# Patient Record
Sex: MALE | Race: WHITE | NOT HISPANIC OR LATINO | Employment: UNEMPLOYED | URBAN - METROPOLITAN AREA
[De-identification: names, ages, dates, MRNs, and addresses within clinical notes are randomized per-mention and may not be internally consistent; named-entity substitution may affect disease eponyms.]

---

## 2017-07-03 ENCOUNTER — COMMUNICATION - HEALTHEAST (OUTPATIENT)
Dept: BEHAVIORAL HEALTH | Facility: CLINIC | Age: 32
End: 2017-07-03

## 2017-07-03 DIAGNOSIS — F25.9 SCHIZOAFFECTIVE DISORDER, CHRONIC CONDITION WITH ACUTE EXACERBATION (H): ICD-10-CM

## 2018-01-02 ENCOUNTER — AMBULATORY - HEALTHEAST (OUTPATIENT)
Dept: LAB | Facility: CLINIC | Age: 33
End: 2018-01-02

## 2018-01-02 DIAGNOSIS — Z79.899 ENCOUNTER FOR LONG-TERM (CURRENT) USE OF MEDICATIONS: ICD-10-CM

## 2018-01-02 DIAGNOSIS — F25.1 SCHIZOAFFECTIVE DISORDER, DEPRESSIVE TYPE (H): ICD-10-CM

## 2018-01-02 LAB
BASOPHILS # BLD AUTO: 0.1 THOU/UL (ref 0–0.2)
BASOPHILS NFR BLD AUTO: 0 % (ref 0–2)
EOSINOPHIL # BLD AUTO: 0.2 THOU/UL (ref 0–0.4)
EOSINOPHIL NFR BLD AUTO: 2 % (ref 0–6)
ERYTHROCYTE [DISTWIDTH] IN BLOOD BY AUTOMATED COUNT: 20.5 % (ref 11–14.5)
HCT VFR BLD AUTO: 37 % (ref 40–54)
HGB BLD-MCNC: 10.8 G/DL (ref 14–18)
LYMPHOCYTES # BLD AUTO: 2.2 THOU/UL (ref 0.8–4.4)
LYMPHOCYTES NFR BLD AUTO: 19 % (ref 20–40)
MCH RBC QN AUTO: 20.8 PG (ref 27–34)
MCHC RBC AUTO-ENTMCNC: 29.2 G/DL (ref 32–36)
MCV RBC AUTO: 71 FL (ref 80–100)
MONOCYTES # BLD AUTO: 1 THOU/UL (ref 0–0.9)
MONOCYTES NFR BLD AUTO: 9 % (ref 2–10)
NEUTROPHILS # BLD AUTO: 8.1 THOU/UL (ref 2–7.7)
NEUTROPHILS NFR BLD AUTO: 70 % (ref 50–70)
PLATELET # BLD AUTO: 331 THOU/UL (ref 140–440)
PMV BLD AUTO: 9.1 FL (ref 8.5–12.5)
RBC # BLD AUTO: 5.18 MILL/UL (ref 4.4–6.2)
WBC: 11.7 THOU/UL (ref 4–11)

## 2018-02-08 ENCOUNTER — RECORDS - HEALTHEAST (OUTPATIENT)
Dept: LAB | Facility: CLINIC | Age: 33
End: 2018-02-08

## 2018-02-08 LAB
CHOLEST SERPL-MCNC: 142 MG/DL
FASTING STATUS PATIENT QL REPORTED: ABNORMAL
FASTING STATUS PATIENT QL REPORTED: NORMAL
GLUCOSE BLD-MCNC: 112 MG/DL (ref 70–125)
HDLC SERPL-MCNC: 40 MG/DL
LDLC SERPL CALC-MCNC: 65 MG/DL
TRIGL SERPL-MCNC: 186 MG/DL

## 2018-02-09 LAB — HBA1C MFR BLD: 6.5 % (ref 4.2–6.1)

## 2018-07-12 ENCOUNTER — RECORDS - HEALTHEAST (OUTPATIENT)
Dept: ADMINISTRATIVE | Facility: OTHER | Age: 33
End: 2018-07-12

## 2018-07-18 ENCOUNTER — HOSPITAL ENCOUNTER (OUTPATIENT)
Dept: RADIOLOGY | Facility: CLINIC | Age: 33
Discharge: HOME | End: 2018-07-18
Attending: PHYSICIAN ASSISTANT

## 2018-07-18 DIAGNOSIS — D50.9 IDA (IRON DEFICIENCY ANEMIA): ICD-10-CM

## 2018-07-18 DIAGNOSIS — K59.04 CHRONIC IDIOPATHIC CONSTIPATION: ICD-10-CM

## 2018-12-30 ASSESSMENT — MIFFLIN-ST. JEOR: SCORE: 3536.28

## 2018-12-31 ENCOUNTER — ANESTHESIA - HEALTHEAST (OUTPATIENT)
Dept: SURGERY | Facility: CLINIC | Age: 33
End: 2018-12-31

## 2018-12-31 ENCOUNTER — SURGERY - HEALTHEAST (OUTPATIENT)
Dept: SURGERY | Facility: CLINIC | Age: 33
End: 2018-12-31

## 2019-01-06 ASSESSMENT — MIFFLIN-ST. JEOR: SCORE: 3368.45

## 2019-01-07 ENCOUNTER — COMMUNICATION - HEALTHEAST (OUTPATIENT)
Dept: SCHEDULING | Facility: CLINIC | Age: 34
End: 2019-01-07

## 2019-01-08 ENCOUNTER — RECORDS - HEALTHEAST (OUTPATIENT)
Dept: LAB | Facility: CLINIC | Age: 34
End: 2019-01-08

## 2019-01-08 LAB
BASOPHILS # BLD AUTO: 0.1 THOU/UL (ref 0–0.2)
BASOPHILS NFR BLD AUTO: 1 % (ref 0–2)
EOSINOPHIL # BLD AUTO: 0.2 THOU/UL (ref 0–0.4)
EOSINOPHIL NFR BLD AUTO: 1 % (ref 0–6)
ERYTHROCYTE [DISTWIDTH] IN BLOOD BY AUTOMATED COUNT: 19.8 % (ref 11–14.5)
HCT VFR BLD AUTO: 41.9 % (ref 40–54)
HGB BLD-MCNC: 12.5 G/DL (ref 14–18)
LYMPHOCYTES # BLD AUTO: 1.3 THOU/UL (ref 0.8–4.4)
LYMPHOCYTES NFR BLD AUTO: 11 % (ref 20–40)
MCH RBC QN AUTO: 23.9 PG (ref 27–34)
MCHC RBC AUTO-ENTMCNC: 29.8 G/DL (ref 32–36)
MCV RBC AUTO: 80 FL (ref 80–100)
MONOCYTES # BLD AUTO: 0.9 THOU/UL (ref 0–0.9)
MONOCYTES NFR BLD AUTO: 7 % (ref 2–10)
NEUTROPHILS # BLD AUTO: 9.8 THOU/UL (ref 2–7.7)
NEUTROPHILS NFR BLD AUTO: 80 % (ref 50–70)
PLATELET # BLD AUTO: 328 THOU/UL (ref 140–440)
PMV BLD AUTO: 10.1 FL (ref 8.5–12.5)
RBC # BLD AUTO: 5.23 MILL/UL (ref 4.4–6.2)
WBC: 12.5 THOU/UL (ref 4–11)

## 2019-01-14 ENCOUNTER — RECORDS - HEALTHEAST (OUTPATIENT)
Dept: LAB | Facility: CLINIC | Age: 34
End: 2019-01-14

## 2019-01-14 LAB
BASOPHILS # BLD AUTO: 0.1 THOU/UL (ref 0–0.2)
BASOPHILS NFR BLD AUTO: 1 % (ref 0–2)
EOSINOPHIL # BLD AUTO: 0.3 THOU/UL (ref 0–0.4)
EOSINOPHIL NFR BLD AUTO: 2 % (ref 0–6)
ERYTHROCYTE [DISTWIDTH] IN BLOOD BY AUTOMATED COUNT: 19.6 % (ref 11–14.5)
HCT VFR BLD AUTO: 42 % (ref 40–54)
HGB BLD-MCNC: 12.3 G/DL (ref 14–18)
LYMPHOCYTES # BLD AUTO: 1.9 THOU/UL (ref 0.8–4.4)
LYMPHOCYTES NFR BLD AUTO: 15 % (ref 20–40)
MCH RBC QN AUTO: 23.7 PG (ref 27–34)
MCHC RBC AUTO-ENTMCNC: 29.3 G/DL (ref 32–36)
MCV RBC AUTO: 81 FL (ref 80–100)
MONOCYTES # BLD AUTO: 1.1 THOU/UL (ref 0–0.9)
MONOCYTES NFR BLD AUTO: 9 % (ref 2–10)
NEUTROPHILS # BLD AUTO: 9.1 THOU/UL (ref 2–7.7)
NEUTROPHILS NFR BLD AUTO: 73 % (ref 50–70)
PLATELET # BLD AUTO: 286 THOU/UL (ref 140–440)
PMV BLD AUTO: 10 FL (ref 8.5–12.5)
RBC # BLD AUTO: 5.2 MILL/UL (ref 4.4–6.2)
WBC: 12.7 THOU/UL (ref 4–11)

## 2019-02-05 ENCOUNTER — RECORDS - HEALTHEAST (OUTPATIENT)
Dept: LAB | Facility: CLINIC | Age: 34
End: 2019-02-05

## 2019-02-05 LAB
BASOPHILS # BLD AUTO: 0.1 THOU/UL (ref 0–0.2)
BASOPHILS NFR BLD AUTO: 1 % (ref 0–2)
EOSINOPHIL # BLD AUTO: 0.2 THOU/UL (ref 0–0.4)
EOSINOPHIL NFR BLD AUTO: 2 % (ref 0–6)
ERYTHROCYTE [DISTWIDTH] IN BLOOD BY AUTOMATED COUNT: 20.2 % (ref 11–14.5)
HCT VFR BLD AUTO: 42.9 % (ref 40–54)
HGB BLD-MCNC: 12.6 G/DL (ref 14–18)
LYMPHOCYTES # BLD AUTO: 1.8 THOU/UL (ref 0.8–4.4)
LYMPHOCYTES NFR BLD AUTO: 17 % (ref 20–40)
MCH RBC QN AUTO: 24.2 PG (ref 27–34)
MCHC RBC AUTO-ENTMCNC: 29.4 G/DL (ref 32–36)
MCV RBC AUTO: 82 FL (ref 80–100)
MONOCYTES # BLD AUTO: 0.8 THOU/UL (ref 0–0.9)
MONOCYTES NFR BLD AUTO: 8 % (ref 2–10)
NEUTROPHILS # BLD AUTO: 7.5 THOU/UL (ref 2–7.7)
NEUTROPHILS NFR BLD AUTO: 72 % (ref 50–70)
PLATELET # BLD AUTO: 254 THOU/UL (ref 140–440)
PMV BLD AUTO: 10.6 FL (ref 8.5–12.5)
RBC # BLD AUTO: 5.21 MILL/UL (ref 4.4–6.2)
WBC: 10.6 THOU/UL (ref 4–11)

## 2019-03-06 ENCOUNTER — RECORDS - HEALTHEAST (OUTPATIENT)
Dept: LAB | Facility: CLINIC | Age: 34
End: 2019-03-06

## 2019-03-06 LAB
BASOPHILS # BLD AUTO: 0.1 THOU/UL (ref 0–0.2)
BASOPHILS NFR BLD AUTO: 1 % (ref 0–2)
EOSINOPHIL # BLD AUTO: 0.2 THOU/UL (ref 0–0.4)
EOSINOPHIL NFR BLD AUTO: 2 % (ref 0–6)
ERYTHROCYTE [DISTWIDTH] IN BLOOD BY AUTOMATED COUNT: 19 % (ref 11–14.5)
HCT VFR BLD AUTO: 43.4 % (ref 40–54)
HGB BLD-MCNC: 12.8 G/DL (ref 14–18)
LYMPHOCYTES # BLD AUTO: 1.5 THOU/UL (ref 0.8–4.4)
LYMPHOCYTES NFR BLD AUTO: 15 % (ref 20–40)
MCH RBC QN AUTO: 24.3 PG (ref 27–34)
MCHC RBC AUTO-ENTMCNC: 29.5 G/DL (ref 32–36)
MCV RBC AUTO: 83 FL (ref 80–100)
MONOCYTES # BLD AUTO: 1.2 THOU/UL (ref 0–0.9)
MONOCYTES NFR BLD AUTO: 11 % (ref 2–10)
NEUTROPHILS # BLD AUTO: 7.4 THOU/UL (ref 2–7.7)
NEUTROPHILS NFR BLD AUTO: 72 % (ref 50–70)
PLATELET # BLD AUTO: 250 THOU/UL (ref 140–440)
PMV BLD AUTO: 10.4 FL (ref 8.5–12.5)
RBC # BLD AUTO: 5.26 MILL/UL (ref 4.4–6.2)
WBC: 10.5 THOU/UL (ref 4–11)

## 2019-04-01 ENCOUNTER — RECORDS - HEALTHEAST (OUTPATIENT)
Dept: LAB | Facility: CLINIC | Age: 34
End: 2019-04-01

## 2019-04-01 LAB
BASOPHILS # BLD AUTO: 0.1 THOU/UL (ref 0–0.2)
BASOPHILS NFR BLD AUTO: 1 % (ref 0–2)
EOSINOPHIL # BLD AUTO: 0.2 THOU/UL (ref 0–0.4)
EOSINOPHIL NFR BLD AUTO: 2 % (ref 0–6)
ERYTHROCYTE [DISTWIDTH] IN BLOOD BY AUTOMATED COUNT: 19 % (ref 11–14.5)
HCT VFR BLD AUTO: 43.5 % (ref 40–54)
HGB BLD-MCNC: 12.7 G/DL (ref 14–18)
LYMPHOCYTES # BLD AUTO: 1.5 THOU/UL (ref 0.8–4.4)
LYMPHOCYTES NFR BLD AUTO: 15 % (ref 20–40)
MCH RBC QN AUTO: 24.8 PG (ref 27–34)
MCHC RBC AUTO-ENTMCNC: 29.2 G/DL (ref 32–36)
MCV RBC AUTO: 85 FL (ref 80–100)
MONOCYTES # BLD AUTO: 1 THOU/UL (ref 0–0.9)
MONOCYTES NFR BLD AUTO: 10 % (ref 2–10)
NEUTROPHILS # BLD AUTO: 7.2 THOU/UL (ref 2–7.7)
NEUTROPHILS NFR BLD AUTO: 72 % (ref 50–70)
PLATELET # BLD AUTO: 252 THOU/UL (ref 140–440)
PMV BLD AUTO: 10.4 FL (ref 8.5–12.5)
RBC # BLD AUTO: 5.13 MILL/UL (ref 4.4–6.2)
WBC: 10.2 THOU/UL (ref 4–11)

## 2021-06-02 ENCOUNTER — RECORDS - HEALTHEAST (OUTPATIENT)
Dept: ADMINISTRATIVE | Facility: CLINIC | Age: 36
End: 2021-06-02

## 2021-06-02 VITALS — WEIGHT: 315 LBS | BODY MASS INDEX: 38.36 KG/M2 | HEIGHT: 76 IN

## 2021-06-22 NOTE — ANESTHESIA CARE TRANSFER NOTE
Last vitals:   Vitals:    12/31/18 1547   BP: (!) 183/98   Pulse: (!) 103   Resp: 16   Temp: 37.3  C (99.1  F)   SpO2: 94%     Patient's level of consciousness is drowsy  Spontaneous respirations: yes  Maintains airway independently: yes  Dentition unchanged: yes  Oropharynx: oropharynx clear of all foreign objects    QCDR Measures:  ASA# 20 - Surgical Safety Checklist: WHO surgical safety checklist completed prior to induction    PQRS# 430 - Adult PONV Prevention: 4558F - Pt received => 2 anti-emetic agents (different classes) preop & intraop  ASA# 8 - Peds PONV Prevention: NA - Not pediatric patient, not GA or 2 or more risk factors NOT present  PQRS# 424 - Carlyn-op Temp Management: 4559F - At least one body temp DOCUMENTED => 35.5C or 95.9F within required timeframe  PQRS# 426 - PACU Transfer Protocol: - Transfer of care checklist used  ASA# 14 - Acute Post-op Pain: ASA14B - Patient did NOT experience pain >= 7 out of 10

## 2021-06-22 NOTE — ANESTHESIA PROCEDURE NOTES
Peripheral Block    Patient location during procedure: pre-op  Start time: 12/31/2018 1:05 PM  End time: 12/31/2018 1:10 PM  post-op analgesia per surgeon order as noted in medical record  Staffing:  Performing  Anesthesiologist: Yovani Richter MD  Preanesthetic Checklist  Completed: patient identified, site marked, risks, benefits, and alternatives discussed, timeout performed, consent obtained, airway assessed, oxygen available, suction available, emergency drugs available and hand hygiene performed  Peripheral Block  Block type: sciatic, popliteal  Prep: ChloraPrep  Patient position: supine  Patient monitoring: cardiac monitor, continuous pulse oximetry, heart rate and blood pressure  Laterality: left  Injection technique: ultrasound guided    Ultrasound used to visualize needle placement in proximity to nerve being blocked: yes   Permanent ultrasound image captured for medical record  Sterile gel and probe cover used for ultrasound.    Needle  Needle type: Stimuplex   Needle gauge: 20G  Needle length: 6 in  no peripheral nerve catheter placed  Assessment  Injection assessment: no difficulty with injection, negative aspiration for heme, no paresthesia on injection and incremental injection

## 2021-06-22 NOTE — ANESTHESIA POSTPROCEDURE EVALUATION
Patient: Sunil Rondon  OPEN REDUCTION INTERNAL FIXATION, FRACTURE, LEFT ANKLE  Anesthesia type: spinal    Patient location: PACU  Last vitals:   Vitals:    12/31/18 1610   BP: 138/61   Pulse: 96   Resp: 18   Temp:    SpO2: 96%     Post vital signs: stable  Level of consciousness: awake and responds to simple questions  Post-anesthesia pain: pain controlled  Post-anesthesia nausea and vomiting: no  Pulmonary: unassisted, return to baseline  Cardiovascular: stable and blood pressure at baseline  Hydration: adequate  Anesthetic events: no    QCDR Measures:  ASA# 11 - Carlyn-op Cardiac Arrest: ASA11B - Patient did NOT experience unanticipated cardiac arrest  ASA# 12 - Carlyn-op Mortality Rate: ASA12B - Patient did NOT die  ASA# 13 - PACU Re-Intubation Rate: NA - No ETT / LMA used for case  ASA# 10 - Composite Anes Safety: ASA10A - No serious adverse event    Additional Notes:

## 2021-06-22 NOTE — ANESTHESIA PROCEDURE NOTES
Peripheral Block    Patient location during procedure: pre-op  Start time: 12/31/2018 1:11 PM  End time: 12/31/2018 1:15 PM  post-op analgesia per surgeon order as noted in medical record  Staffing:  Performing  Anesthesiologist: Yovani Richter MD  Preanesthetic Checklist  Completed: patient identified, site marked, risks, benefits, and alternatives discussed, timeout performed, consent obtained, airway assessed, oxygen available, suction available, emergency drugs available and hand hygiene performed  Peripheral Block  Block type: saphenous, adductor canal block  Prep: ChloraPrep  Patient position: supine  Patient monitoring: cardiac monitor, continuous pulse oximetry, heart rate and blood pressure  Laterality: left  Injection technique: ultrasound guided    Ultrasound used to visualize needle placement in proximity to nerve being blocked: yes   Permanent ultrasound image captured for medical record  Sterile gel and probe cover used for ultrasound.    Needle  Needle type: Stimuplex   Needle gauge: 20G  Needle length: 6 in  no peripheral nerve catheter placed  Assessment  Injection assessment: no difficulty with injection, negative aspiration for heme, no paresthesia on injection and incremental injection

## 2021-06-22 NOTE — ANESTHESIA PROCEDURE NOTES
Spinal Block    Patient location during procedure: OR  Start time: 12/31/2018 1:34 PM  End time: 12/31/2018 1:37 PM  Reason for block: primary anesthetic    Staffing:  Performing  Anesthesiologist: Yovani Richter MD    Preanesthetic Checklist  Completed: patient identified, risks, benefits, and alternatives discussed, timeout performed, consent obtained, airway assessed, oxygen available, suction available, emergency drugs available and hand hygiene performed  Spinal Block  Patient position: sitting  Prep: ChloraPrep and site prepped and draped  Patient monitoring: heart rate, cardiac monitor, continuous pulse ox and blood pressure  Approach: midline  Location: L3-4  Injection technique: single-shot  Needle type: Quincke   Needle gauge: 22 G    Assessment  Sensory level: T6

## 2021-06-22 NOTE — ANESTHESIA PREPROCEDURE EVALUATION
Anesthesia Evaluation      Patient summary reviewed     Airway   Mallampati: II  Neck ROM: full   Pulmonary - negative ROS and normal exam    breath sounds clear to auscultation  (+) sleep apnea on CPAP, ,                          Cardiovascular - normal exam  (+) hypertension, ,     Rhythm: regular  Rate: normal,         Neuro/Psych    (+) depression, anxiety/panic attacks,     Endo/Other    (+) diabetes mellitus type 2, arthritis, obesity,      GI/Hepatic/Renal    (+) GERD,             Dental - normal exam                        Anesthesia Plan  Planned anesthetic: peripheral nerve block and spinal    ASA 3     Anesthetic plan and risks discussed with: patient    Post-op plan: routine recovery

## 2021-06-22 NOTE — TELEPHONE ENCOUNTER
[facility] calling from [facility] for [facility] and other records.     Advised Anaid to contact pt's PCP.     Anaid agrees to plan.     Reason for Disposition    Nursing judgment or information in reference    Protocols used: NO GUIDELINE [id number]

## 2021-09-30 PROCEDURE — 99284 EMERGENCY DEPT VISIT MOD MDM: CPT

## 2021-09-30 PROCEDURE — 99283 EMERGENCY DEPT VISIT LOW MDM: CPT

## 2021-10-01 ENCOUNTER — APPOINTMENT (OUTPATIENT)
Dept: RADIOLOGY | Facility: CLINIC | Age: 36
End: 2021-10-01
Attending: STUDENT IN AN ORGANIZED HEALTH CARE EDUCATION/TRAINING PROGRAM
Payer: MEDICAID

## 2021-10-01 ENCOUNTER — HOSPITAL ENCOUNTER (EMERGENCY)
Facility: CLINIC | Age: 36
Discharge: HOME | End: 2021-10-01
Attending: STUDENT IN AN ORGANIZED HEALTH CARE EDUCATION/TRAINING PROGRAM | Admitting: STUDENT IN AN ORGANIZED HEALTH CARE EDUCATION/TRAINING PROGRAM
Payer: MEDICAID

## 2021-10-01 VITALS
HEART RATE: 108 BPM | SYSTOLIC BLOOD PRESSURE: 130 MMHG | DIASTOLIC BLOOD PRESSURE: 79 MMHG | RESPIRATION RATE: 22 BRPM | OXYGEN SATURATION: 95 % | TEMPERATURE: 99.6 F

## 2021-10-01 DIAGNOSIS — J06.9 VIRAL URI: ICD-10-CM

## 2021-10-01 DIAGNOSIS — M25.561 ACUTE PAIN OF RIGHT KNEE: ICD-10-CM

## 2021-10-01 LAB
ATRIAL RATE - MUSE: 106 BPM
DIASTOLIC BLOOD PRESSURE - MUSE: NORMAL MMHG
INTERPRETATION ECG - MUSE: NORMAL
P AXIS - MUSE: 47 DEGREES
PR INTERVAL - MUSE: 188 MS
QRS DURATION - MUSE: 108 MS
QT - MUSE: 350 MS
QTC - MUSE: 464 MS
R AXIS - MUSE: 53 DEGREES
SYSTOLIC BLOOD PRESSURE - MUSE: NORMAL MMHG
T AXIS - MUSE: 28 DEGREES
VENTRICULAR RATE- MUSE: 106 BPM

## 2021-10-01 PROCEDURE — 93005 ELECTROCARDIOGRAM TRACING: CPT | Performed by: STUDENT IN AN ORGANIZED HEALTH CARE EDUCATION/TRAINING PROGRAM

## 2021-10-01 PROCEDURE — 250N000013 HC RX MED GY IP 250 OP 250 PS 637: Performed by: STUDENT IN AN ORGANIZED HEALTH CARE EDUCATION/TRAINING PROGRAM

## 2021-10-01 PROCEDURE — 93005 ELECTROCARDIOGRAM TRACING: CPT | Performed by: EMERGENCY MEDICINE

## 2021-10-01 PROCEDURE — 73562 X-RAY EXAM OF KNEE 3: CPT | Mod: RT

## 2021-10-01 RX ORDER — IBUPROFEN 600 MG/1
600 TABLET, FILM COATED ORAL ONCE
Status: COMPLETED | OUTPATIENT
Start: 2021-10-01 | End: 2021-10-01

## 2021-10-01 RX ORDER — ACETAMINOPHEN 325 MG/1
975 TABLET ORAL ONCE
Status: COMPLETED | OUTPATIENT
Start: 2021-10-01 | End: 2021-10-01

## 2021-10-01 RX ADMIN — ACETAMINOPHEN 975 MG: 325 TABLET ORAL at 04:24

## 2021-10-01 RX ADMIN — IBUPROFEN 600 MG: 600 TABLET, FILM COATED ORAL at 04:24

## 2021-10-01 NOTE — ED NOTES
Pt calling back to group home for ride.  No crutches or walker able to work due to pt's weight.  Provider aware

## 2021-10-01 NOTE — ED PROVIDER NOTES
EMERGENCY DEPARTMENT ENCOUNTER       ED Course & Medical Decision Making     3:42 AM I met with the patient to gather history and to perform my initial exam. We discussed results and the plan for discharge and orthopedic follow up. PPE worn: n95 mask.     Final Impression  36 year old male presents for evaluation of right knee pain as well as URI symptoms.  States that he fell in the bathroom while he was pivoting on 1 foot, fell awkwardly on his right leg and has had knee pain since that time.  Patient has significant tenderness with any attempted manipulation or range of motion of the knee, though exam is somewhat inconsistent at times (will sometimes  shout with pain with a certain exam maneuver or palpation of a particular spot, but then later when he is reexamined in the same spot has no pain).  May have mild effusion, though exam somewhat limited by patient's body habitus.  X-ray negative for acute bony injuries.  No history of dislocation that he is describing.  Good peripheral pulses in his right foot, good perfusion throughout toes, neuro intact in the foot.  Discussed with patient that he very well could have underlying soft tissue injury such as meniscus or ligament injury, though will need an MRI to ultimately determine that.  Patient too large for a knee immobilizer, thus will be Ace wrap x2.  Tylenol and ibuprofen given for pain control.  Also endorses some euros, the patient is not hypoxic, lungs clear to auscultation bilaterally.  Recommended Mucinex DM as needed.  Follow-up with orthopedics for further evaluation of his knee.    Prior to making a final disposition on this patient the results of patient's tests and other diagnostic studies were discussed with the patient. All questions were answered. Patient expressed understanding of the plan and was amenable to it.    Medications   acetaminophen (TYLENOL) tablet 975 mg (975 mg Oral Given 10/1/21 8845)   ibuprofen (ADVIL/MOTRIN) tablet 600 mg (600  mg Oral Given 10/1/21 1549)       Final Impression     1. Viral URI    2. Acute pain of right knee      Chief Complaint     Chief Complaint   Patient presents with     Knee Pain     Right      Cold Symptoms     Pt presents with R knee pain. Pt also c/o congestion and cold like symptoms. ABCs intact.     HPI     Sunil Rondon is a 36 year old male who presents for evaluation of right knee pain.    Patient reports right knee pain and swelling secondary to a mechanical fall today (10/1). He went to turn around in his bathroom when his right foot slipped and caused his right knee to twist. He felt a popping sensation in his knee followed by immediate pain. His pain is a constant and non-radiating 9/10. His pain is significantly worse with bending his knee. No other injuries or pain related complaints from the fall.     Patient also reports sore throat, cough, congestion, voice changes, headache, and chest pain. He has been taking ibuprofen without adequate relief.     I, Nickolas Brewer am serving as a scribe to document services personally performed by Dr. Roby Rae MD, based on my observation and the provider's statements to me. I, Dr. Roby Rae MD attest that Nickolas Brewer is acting in a scribe capacity, has observed my performance of the services and has documented them in accordance with my direction.    Past Medical History     Past Medical History:   Diagnosis Date     Anxiety      Bipolar 1 disorder (H)      Borderline mental retardation      Depression      Diabetes mellitus (H)      Gender identity disorder      GERD (gastroesophageal reflux disease)      Glucose intolerance (impaired glucose tolerance)      Hypertension      Obesity      Panic attacks      Schizoaffective disorder (H)      Sinus tarsi syndrome      Sleep apnea      Suicidal ideation      Past Surgical History:   Procedure Laterality Date     HC KNEE SCOPE,SINGLE MENISECTOMY Left 6/8/2016    Procedure: LEFT KNEE ARTHROSCOPY,  PARTIAL MEDIAL MENISCECTOMY, LOOSE BODY REMOVAL, CHONDROPLASTY;  Surgeon: Sky Goodwin MD;  Location: [facility] OR;  Service: Orthopedics     OPEN REDUCTION INTERNAL FIXATION ANKLE Left 12/31/2018    Procedure: OPEN REDUCTION INTERNAL FIXATION, FRACTURE, LEFT ANKLE;  Surgeon: Jarad Mosqueda MD;  Location: [facility];  Service: Orthopedics     TENDON REPAIR Left     wrist     UPPER GASTROINTESTINAL ENDOSCOPY       No family history on file.   Social History     Tobacco Use     Smoking status: Never Smoker     Smokeless tobacco: Never Used   Substance Use Topics     Alcohol use: No     Drug use: No       Relevant past medical, surgical, family and social history as documented above, has been reviewed and discussed with patient. No changes or additions, unless otherwise noted in the HPI.    Current Medications     acetaminophen (TYLENOL) 500 MG tablet  amLODIPine (NORVASC) 5 MG tablet  benzocaine-menthol (CEPACOL) 15-3.6 mg  cetirizine (ZYRTEC) 10 MG tablet  cloZAPine (CLOZARIL) 100 MG tablet  cyclobenzaprine (FLEXERIL) 10 MG tablet  docusate sodium (COLACE) 100 MG capsule  doxycycline (MONODOX) 100 MG capsule  enoxaparin (LOVENOX) 40 mg/0.4 mL syringe  ergocalciferol (ERGOCALCIFEROL) 50,000 unit capsule  famotidine (PEPCID) 20 MG tablet  ferrous gluconate 324 mg (37.5 mg iron) Tab  gabapentin (NEURONTIN) 600 MG tablet  gabapentin (NEURONTIN) 600 MG tablet  lamoTRIgine (LAMICTAL) 150 MG tablet  losartan (COZAAR) 100 MG tablet  melatonin 3 mg Tab  metFORMIN (GLUCOPHAGE) 500 MG tablet  OMEGA-3/DHA/EPA/FISH OIL (FISH OIL-OMEGA-3 FATTY ACIDS) 300-1,000 mg capsule  omeprazole (PRILOSEC) 40 MG capsule  ondansetron (ZOFRAN-ODT) 8 MG disintegrating tablet  oxyCODONE (ROXICODONE) 5 MG immediate release tablet  polyethylene glycol (MIRALAX) 17 gram packet  senna (SENOKOT) 8.6 mg tablet  sertraline (ZOLOFT) 100 MG tablet  simethicone (MYLICON) 80 MG chewable tablet  tolterodine (DETROL LA) 4 MG ER  capsule        Allergies     No Known Allergies    Review of Systems     Constitutional: Denies fevers  HENT: Endorses sore throat, congestion, voice changes. Denies ear pain  Respiratory: Endorses cough. Denies shortness of breath    Cardiovascular:  Endorses chest pain.  GI: Denies abdominal pain, nausea, vomiting  Musculoskeletal: Endorses right knee pain and swelling. Denies any new back pain.  Neurologic: Endorses headache. Denies new weakness, focal weakness, or sensory changes      Remainder of systems reviewed, unless noted in HPI all others negative.    Physical Exam     /79   Pulse 108   Temp 99.6  F (37.6  C) (Oral)   Resp 22   SpO2 95%   Constitutional: Awake, alert, in no acute distress  Head: Normocephalic, atraumatic.  ENT: Mucous membranes moist.   Eyes: PERRL, Conjunctiva normal  Respiratory: Respirations even, unlabored. Lungs clear to ascultation bilaterally, in no acute respiratory distress.  Cardiovascular: Sinus tachycardia, rate about 100. +2 radial pulses, equal bilaterally.  GI: Abdomen soft, non-tender   Musculoskeletal: Exquisite tenderness with any attempted range of motion of the right knee.  Evaluation for effusion somewhat limited by body habitus, though does appear to have a mild effusion.  +2 DP pulse in the right foot, good sensation throughout toes on the right foot.  Remainder of MSK exam is otherwise unremarkable.  Integument: Warm, dry.   Neurologic: Alert & oriented x 3. Normal speech. Grossly normal motor and sensory function. No focal deficits noted.  Psychiatric: Normal mood    Labs & Imaging     Results for orders placed or performed during the hospital encounter of 10/01/21   XR Knee Right 3 Views    Impression    IMPRESSION: No visible fracture or dislocation. Well-defined 1.6 x 0.8 cm sclerotic lesion medial aspect of the femur distally. Comparison to any prior imaging to confirm stability suggested.        Roby Rae MD  10/01/21 0691

## 2021-10-01 NOTE — DISCHARGE INSTRUCTIONS
You may try Mucinex DM to help with your URI symptoms    Follow-up with orthopedics for your knee pain as they may need to do additional examination and possibly an MRI of the knee.

## 2021-10-01 NOTE — ED NOTES
Pt reports intermitted SI over the last few weeks. Pt reports increased depression. Pt see counseling service weekly, MD updated.

## 2021-10-01 NOTE — ED NOTES
Notified provider that knee immobilizer size is not available for size of pt.  Did wrap knee with 2 large ace wraps.

## 2021-11-13 ENCOUNTER — TRANSFERRED RECORDS (OUTPATIENT)
Dept: HEALTH INFORMATION MANAGEMENT | Facility: CLINIC | Age: 36
End: 2021-11-13
Payer: MEDICAID

## 2021-11-13 ENCOUNTER — HOSPITAL ENCOUNTER (INPATIENT)
Facility: CLINIC | Age: 36
LOS: 5 days | Discharge: HOME HEALTH | End: 2021-11-18
Attending: EMERGENCY MEDICINE | Admitting: INTERNAL MEDICINE
Payer: MEDICAID

## 2021-11-13 DIAGNOSIS — J18.9 COMMUNITY ACQUIRED PNEUMONIA OF LEFT LOWER LOBE OF LUNG: Primary | ICD-10-CM

## 2021-11-13 PROCEDURE — 120N000001 HC R&B MED SURG/OB

## 2021-11-13 PROCEDURE — 96360 HYDRATION IV INFUSION INIT: CPT

## 2021-11-13 PROCEDURE — 99285 EMERGENCY DEPT VISIT HI MDM: CPT | Mod: 25

## 2021-11-13 PROCEDURE — 99223 1ST HOSP IP/OBS HIGH 75: CPT | Mod: AI | Performed by: INTERNAL MEDICINE

## 2021-11-13 PROCEDURE — C9803 HOPD COVID-19 SPEC COLLECT: HCPCS

## 2021-11-13 PROCEDURE — 93005 ELECTROCARDIOGRAM TRACING: CPT | Performed by: EMERGENCY MEDICINE

## 2021-11-13 PROCEDURE — 96361 HYDRATE IV INFUSION ADD-ON: CPT

## 2021-11-13 ASSESSMENT — MIFFLIN-ST. JEOR: SCORE: 3305.86

## 2021-11-14 LAB
ALBUMIN SERPL-MCNC: 3.3 G/DL (ref 3.5–5)
ALP SERPL-CCNC: 86 U/L (ref 45–120)
ALT SERPL W P-5'-P-CCNC: 26 U/L (ref 0–45)
ANION GAP SERPL CALCULATED.3IONS-SCNC: 11 MMOL/L (ref 5–18)
AST SERPL W P-5'-P-CCNC: 17 U/L (ref 0–40)
BASE EXCESS BLDV CALC-SCNC: 2.2 MMOL/L
BASOPHILS # BLD AUTO: 0.1 10E3/UL (ref 0–0.2)
BASOPHILS NFR BLD AUTO: 0 %
BILIRUB SERPL-MCNC: 0.2 MG/DL (ref 0–1)
BNP SERPL-MCNC: <10 PG/ML (ref 0–35)
BUN SERPL-MCNC: 12 MG/DL (ref 8–22)
CALCIUM SERPL-MCNC: 8.9 MG/DL (ref 8.5–10.5)
CHLORIDE BLD-SCNC: 104 MMOL/L (ref 98–107)
CO2 SERPL-SCNC: 24 MMOL/L (ref 22–31)
CREAT SERPL-MCNC: 0.81 MG/DL (ref 0.7–1.3)
EOSINOPHIL # BLD AUTO: 0.1 10E3/UL (ref 0–0.7)
EOSINOPHIL NFR BLD AUTO: 1 %
ERYTHROCYTE [DISTWIDTH] IN BLOOD BY AUTOMATED COUNT: 19.9 % (ref 10–15)
GFR SERPL CREATININE-BSD FRML MDRD: >90 ML/MIN/1.73M2
GLUCOSE BLD-MCNC: 54 MG/DL (ref 70–125)
GLUCOSE BLDC GLUCOMTR-MCNC: 101 MG/DL (ref 70–99)
GLUCOSE BLDC GLUCOMTR-MCNC: 105 MG/DL (ref 70–99)
GLUCOSE BLDC GLUCOMTR-MCNC: 109 MG/DL (ref 70–99)
GLUCOSE BLDC GLUCOMTR-MCNC: 118 MG/DL (ref 70–99)
GLUCOSE BLDC GLUCOMTR-MCNC: 152 MG/DL (ref 70–99)
GLUCOSE BLDC GLUCOMTR-MCNC: 153 MG/DL (ref 70–99)
GLUCOSE BLDC GLUCOMTR-MCNC: 54 MG/DL (ref 70–99)
GLUCOSE BLDC GLUCOMTR-MCNC: 75 MG/DL (ref 70–99)
GLUCOSE BLDC GLUCOMTR-MCNC: 89 MG/DL (ref 70–99)
GLUCOSE BLDC GLUCOMTR-MCNC: 93 MG/DL (ref 70–99)
GLUCOSE BLDC GLUCOMTR-MCNC: 93 MG/DL (ref 70–99)
GLUCOSE BLDC GLUCOMTR-MCNC: 97 MG/DL (ref 70–99)
HBA1C MFR BLD: 5.9 %
HCO3 BLDV-SCNC: 26 MMOL/L (ref 24–30)
HCT VFR BLD AUTO: 39.4 % (ref 40–53)
HGB BLD-MCNC: 12.1 G/DL (ref 13.3–17.7)
IMM GRANULOCYTES # BLD: 0.1 10E3/UL
IMM GRANULOCYTES NFR BLD: 1 %
L PNEUMO1 AG UR QL IA: NEGATIVE
LACTATE SERPL-SCNC: 1.8 MMOL/L (ref 0.7–2)
LIPASE SERPL-CCNC: 24 U/L (ref 0–52)
LYMPHOCYTES # BLD AUTO: 1.5 10E3/UL (ref 0.8–5.3)
LYMPHOCYTES NFR BLD AUTO: 10 %
MAGNESIUM SERPL-MCNC: 2 MG/DL (ref 1.8–2.6)
MAGNESIUM SERPL-MCNC: 2.1 MG/DL (ref 1.8–2.6)
MCH RBC QN AUTO: 23.2 PG (ref 26.5–33)
MCHC RBC AUTO-ENTMCNC: 30.7 G/DL (ref 31.5–36.5)
MCV RBC AUTO: 76 FL (ref 78–100)
MONOCYTES # BLD AUTO: 1.9 10E3/UL (ref 0–1.3)
MONOCYTES NFR BLD AUTO: 12 %
NEUTROPHILS # BLD AUTO: 11.4 10E3/UL (ref 1.6–8.3)
NEUTROPHILS NFR BLD AUTO: 76 %
NRBC # BLD AUTO: 0 10E3/UL
NRBC BLD AUTO-RTO: 0 /100
OXYHGB MFR BLDV: 83.9 % (ref 70–75)
PCO2 BLDV: 45 MM HG (ref 35–50)
PH BLDV: 7.39 [PH] (ref 7.35–7.45)
PLATELET # BLD AUTO: 259 10E3/UL (ref 150–450)
PO2 BLDV: 52 MM HG (ref 25–47)
POTASSIUM BLD-SCNC: 3.8 MMOL/L (ref 3.5–5)
POTASSIUM BLD-SCNC: 3.8 MMOL/L (ref 3.5–5)
PROCALCITONIN SERPL-MCNC: 0.09 NG/ML (ref 0–0.49)
PROT SERPL-MCNC: 7.5 G/DL (ref 6–8)
RBC # BLD AUTO: 5.22 10E6/UL (ref 4.4–5.9)
S PNEUM AG SPEC QL: NEGATIVE
SAO2 % BLDV: 84.4 % (ref 70–75)
SODIUM SERPL-SCNC: 139 MMOL/L (ref 136–145)
WBC # BLD AUTO: 15.1 10E3/UL (ref 4–11)

## 2021-11-14 PROCEDURE — 250N000013 HC RX MED GY IP 250 OP 250 PS 637: Performed by: INTERNAL MEDICINE

## 2021-11-14 PROCEDURE — 87899 AGENT NOS ASSAY W/OPTIC: CPT | Performed by: INTERNAL MEDICINE

## 2021-11-14 PROCEDURE — 83735 ASSAY OF MAGNESIUM: CPT | Performed by: INTERNAL MEDICINE

## 2021-11-14 PROCEDURE — 84145 PROCALCITONIN (PCT): CPT | Performed by: INTERNAL MEDICINE

## 2021-11-14 PROCEDURE — 83880 ASSAY OF NATRIURETIC PEPTIDE: CPT | Performed by: INTERNAL MEDICINE

## 2021-11-14 PROCEDURE — 83036 HEMOGLOBIN GLYCOSYLATED A1C: CPT | Performed by: INTERNAL MEDICINE

## 2021-11-14 PROCEDURE — 250N000011 HC RX IP 250 OP 636: Performed by: INTERNAL MEDICINE

## 2021-11-14 PROCEDURE — 36415 COLL VENOUS BLD VENIPUNCTURE: CPT | Performed by: INTERNAL MEDICINE

## 2021-11-14 PROCEDURE — 83605 ASSAY OF LACTIC ACID: CPT | Performed by: INTERNAL MEDICINE

## 2021-11-14 PROCEDURE — 84132 ASSAY OF SERUM POTASSIUM: CPT | Performed by: INTERNAL MEDICINE

## 2021-11-14 PROCEDURE — 87040 BLOOD CULTURE FOR BACTERIA: CPT | Performed by: INTERNAL MEDICINE

## 2021-11-14 PROCEDURE — 83690 ASSAY OF LIPASE: CPT | Performed by: INTERNAL MEDICINE

## 2021-11-14 PROCEDURE — 99233 SBSQ HOSP IP/OBS HIGH 50: CPT | Performed by: INTERNAL MEDICINE

## 2021-11-14 PROCEDURE — 80053 COMPREHEN METABOLIC PANEL: CPT | Performed by: INTERNAL MEDICINE

## 2021-11-14 PROCEDURE — 120N000001 HC R&B MED SURG/OB

## 2021-11-14 PROCEDURE — 258N000003 HC RX IP 258 OP 636: Performed by: INTERNAL MEDICINE

## 2021-11-14 PROCEDURE — 82805 BLOOD GASES W/O2 SATURATION: CPT | Performed by: INTERNAL MEDICINE

## 2021-11-14 PROCEDURE — 258N000001 HC RX 258: Performed by: INTERNAL MEDICINE

## 2021-11-14 PROCEDURE — 85025 COMPLETE CBC W/AUTO DIFF WBC: CPT | Performed by: INTERNAL MEDICINE

## 2021-11-14 PROCEDURE — 87205 SMEAR GRAM STAIN: CPT | Performed by: INTERNAL MEDICINE

## 2021-11-14 RX ORDER — IBUPROFEN 200 MG
600 TABLET ORAL EVERY 6 HOURS PRN
COMMUNITY
End: 2023-08-20

## 2021-11-14 RX ORDER — METOPROLOL SUCCINATE 200 MG/1
200 TABLET, EXTENDED RELEASE ORAL DAILY
COMMUNITY

## 2021-11-14 RX ORDER — METOPROLOL SUCCINATE 100 MG/1
200 TABLET, EXTENDED RELEASE ORAL DAILY
Status: DISCONTINUED | OUTPATIENT
Start: 2021-11-14 | End: 2021-11-18 | Stop reason: HOSPADM

## 2021-11-14 RX ORDER — CLOZAPINE 100 MG/1
300 TABLET ORAL AT BEDTIME
Status: DISCONTINUED | OUTPATIENT
Start: 2021-11-14 | End: 2021-11-18 | Stop reason: HOSPADM

## 2021-11-14 RX ORDER — ALBUTEROL SULFATE 90 UG/1
2 AEROSOL, METERED RESPIRATORY (INHALATION) EVERY 6 HOURS PRN
Status: DISCONTINUED | OUTPATIENT
Start: 2021-11-14 | End: 2021-11-18 | Stop reason: HOSPADM

## 2021-11-14 RX ORDER — ATORVASTATIN CALCIUM 20 MG/1
20 TABLET, FILM COATED ORAL AT BEDTIME
COMMUNITY

## 2021-11-14 RX ORDER — CLOZAPINE 50 MG/1
325 TABLET ORAL AT BEDTIME
COMMUNITY

## 2021-11-14 RX ORDER — DEXTROSE MONOHYDRATE 25 G/50ML
25-50 INJECTION, SOLUTION INTRAVENOUS
Status: DISCONTINUED | OUTPATIENT
Start: 2021-11-14 | End: 2021-11-18 | Stop reason: HOSPADM

## 2021-11-14 RX ORDER — ACETAMINOPHEN 650 MG/1
650 SUPPOSITORY RECTAL EVERY 6 HOURS PRN
Status: DISCONTINUED | OUTPATIENT
Start: 2021-11-14 | End: 2021-11-18 | Stop reason: HOSPADM

## 2021-11-14 RX ORDER — LAMOTRIGINE 150 MG/1
300 TABLET ORAL AT BEDTIME
Status: DISCONTINUED | OUTPATIENT
Start: 2021-11-14 | End: 2021-11-18 | Stop reason: HOSPADM

## 2021-11-14 RX ORDER — LIRAGLUTIDE 6 MG/ML
1.8 INJECTION SUBCUTANEOUS DAILY
COMMUNITY

## 2021-11-14 RX ORDER — SERTRALINE HYDROCHLORIDE 100 MG/1
300 TABLET, FILM COATED ORAL DAILY
Status: DISCONTINUED | OUTPATIENT
Start: 2021-11-14 | End: 2021-11-18 | Stop reason: HOSPADM

## 2021-11-14 RX ORDER — CEFTRIAXONE 2 G/1
2 INJECTION, POWDER, FOR SOLUTION INTRAMUSCULAR; INTRAVENOUS EVERY 24 HOURS
Status: DISCONTINUED | OUTPATIENT
Start: 2021-11-14 | End: 2021-11-17

## 2021-11-14 RX ORDER — ALBUTEROL SULFATE 90 UG/1
2 AEROSOL, METERED RESPIRATORY (INHALATION) EVERY 6 HOURS PRN
COMMUNITY

## 2021-11-14 RX ORDER — SODIUM CHLORIDE 9 MG/ML
INJECTION, SOLUTION INTRAVENOUS CONTINUOUS
Status: DISCONTINUED | OUTPATIENT
Start: 2021-11-14 | End: 2021-11-14

## 2021-11-14 RX ORDER — PANTOPRAZOLE SODIUM 20 MG/1
40 TABLET, DELAYED RELEASE ORAL 2 TIMES DAILY
Status: DISCONTINUED | OUTPATIENT
Start: 2021-11-14 | End: 2021-11-18 | Stop reason: HOSPADM

## 2021-11-14 RX ORDER — DEXTROSE MONOHYDRATE 25 G/50ML
25 INJECTION, SOLUTION INTRAVENOUS ONCE
Status: COMPLETED | OUTPATIENT
Start: 2021-11-14 | End: 2021-11-14

## 2021-11-14 RX ORDER — GUAIFENESIN/DEXTROMETHORPHAN 100-10MG/5
10 SYRUP ORAL EVERY 4 HOURS PRN
Status: DISCONTINUED | OUTPATIENT
Start: 2021-11-14 | End: 2021-11-18 | Stop reason: HOSPADM

## 2021-11-14 RX ORDER — NICOTINE POLACRILEX 4 MG
15-30 LOZENGE BUCCAL
Status: DISCONTINUED | OUTPATIENT
Start: 2021-11-14 | End: 2021-11-18 | Stop reason: HOSPADM

## 2021-11-14 RX ORDER — SUCRALFATE 1 G/1
1 TABLET ORAL 4 TIMES DAILY
Status: DISCONTINUED | OUTPATIENT
Start: 2021-11-14 | End: 2021-11-18 | Stop reason: HOSPADM

## 2021-11-14 RX ORDER — TIZANIDINE 2 MG/1
2 TABLET ORAL AT BEDTIME
Status: DISCONTINUED | OUTPATIENT
Start: 2021-11-14 | End: 2021-11-18 | Stop reason: HOSPADM

## 2021-11-14 RX ORDER — AZITHROMYCIN 500 MG/5ML
500 INJECTION, POWDER, LYOPHILIZED, FOR SOLUTION INTRAVENOUS EVERY 24 HOURS
Status: DISCONTINUED | OUTPATIENT
Start: 2021-11-14 | End: 2021-11-17

## 2021-11-14 RX ORDER — AMLODIPINE BESYLATE 5 MG/1
5 TABLET ORAL DAILY
Status: DISCONTINUED | OUTPATIENT
Start: 2021-11-14 | End: 2021-11-18 | Stop reason: HOSPADM

## 2021-11-14 RX ORDER — POLYETHYLENE GLYCOL 3350 17 G/17G
17 POWDER, FOR SOLUTION ORAL DAILY PRN
Status: DISCONTINUED | OUTPATIENT
Start: 2021-11-14 | End: 2021-11-18 | Stop reason: HOSPADM

## 2021-11-14 RX ORDER — LIDOCAINE 40 MG/G
CREAM TOPICAL
Status: DISCONTINUED | OUTPATIENT
Start: 2021-11-14 | End: 2021-11-18 | Stop reason: HOSPADM

## 2021-11-14 RX ORDER — ATORVASTATIN CALCIUM 10 MG/1
20 TABLET, FILM COATED ORAL AT BEDTIME
Status: DISCONTINUED | OUTPATIENT
Start: 2021-11-14 | End: 2021-11-18 | Stop reason: HOSPADM

## 2021-11-14 RX ORDER — ALBUTEROL SULFATE 0.83 MG/ML
2.5 SOLUTION RESPIRATORY (INHALATION) EVERY 6 HOURS PRN
Status: DISCONTINUED | OUTPATIENT
Start: 2021-11-14 | End: 2021-11-18 | Stop reason: HOSPADM

## 2021-11-14 RX ORDER — SUCRALFATE 1 G/1
1 TABLET ORAL 4 TIMES DAILY
COMMUNITY
End: 2024-09-27

## 2021-11-14 RX ORDER — FAMOTIDINE 20 MG/1
20 TABLET, FILM COATED ORAL 2 TIMES DAILY
Status: DISCONTINUED | OUTPATIENT
Start: 2021-11-14 | End: 2021-11-18 | Stop reason: HOSPADM

## 2021-11-14 RX ORDER — ACETAMINOPHEN 325 MG/1
650 TABLET ORAL EVERY 6 HOURS PRN
Status: DISCONTINUED | OUTPATIENT
Start: 2021-11-14 | End: 2021-11-18 | Stop reason: HOSPADM

## 2021-11-14 RX ORDER — GABAPENTIN 400 MG/1
800 CAPSULE ORAL DAILY PRN
Status: DISCONTINUED | OUTPATIENT
Start: 2021-11-14 | End: 2021-11-18 | Stop reason: HOSPADM

## 2021-11-14 RX ORDER — CLOZAPINE 25 MG/1
50 TABLET ORAL AT BEDTIME
Status: DISCONTINUED | OUTPATIENT
Start: 2021-11-14 | End: 2021-11-18 | Stop reason: HOSPADM

## 2021-11-14 RX ORDER — LOSARTAN POTASSIUM 50 MG/1
100 TABLET ORAL DAILY
Status: DISCONTINUED | OUTPATIENT
Start: 2021-11-14 | End: 2021-11-18 | Stop reason: HOSPADM

## 2021-11-14 RX ORDER — TIZANIDINE 2 MG/1
2 TABLET ORAL AT BEDTIME
COMMUNITY

## 2021-11-14 RX ORDER — GLIPIZIDE 10 MG/1
10 TABLET, FILM COATED, EXTENDED RELEASE ORAL DAILY
COMMUNITY
End: 2023-08-20

## 2021-11-14 RX ORDER — GABAPENTIN 400 MG/1
800 CAPSULE ORAL 2 TIMES DAILY
Status: DISCONTINUED | OUTPATIENT
Start: 2021-11-14 | End: 2021-11-18 | Stop reason: HOSPADM

## 2021-11-14 RX ADMIN — SUCRALFATE 1 G: 1 TABLET ORAL at 20:07

## 2021-11-14 RX ADMIN — ENOXAPARIN SODIUM 40 MG: 100 INJECTION SUBCUTANEOUS at 12:36

## 2021-11-14 RX ADMIN — METOPROLOL SUCCINATE 200 MG: 50 TABLET, EXTENDED RELEASE ORAL at 10:48

## 2021-11-14 RX ADMIN — SUCRALFATE 1 G: 1 TABLET ORAL at 12:35

## 2021-11-14 RX ADMIN — DEXTROSE AND SODIUM CHLORIDE: 5; 900 INJECTION, SOLUTION INTRAVENOUS at 03:05

## 2021-11-14 RX ADMIN — SUCRALFATE 1 G: 1 TABLET ORAL at 16:07

## 2021-11-14 RX ADMIN — FAMOTIDINE 20 MG: 20 TABLET ORAL at 10:49

## 2021-11-14 RX ADMIN — PANTOPRAZOLE SODIUM 40 MG: 20 TABLET, DELAYED RELEASE ORAL at 10:49

## 2021-11-14 RX ADMIN — ACETAMINOPHEN 650 MG: 325 TABLET ORAL at 16:11

## 2021-11-14 RX ADMIN — LOSARTAN POTASSIUM 100 MG: 50 TABLET, FILM COATED ORAL at 10:48

## 2021-11-14 RX ADMIN — DEXTROSE AND SODIUM CHLORIDE: 5; 900 INJECTION, SOLUTION INTRAVENOUS at 23:05

## 2021-11-14 RX ADMIN — CLOZAPINE 50 MG: 25 TABLET ORAL at 20:16

## 2021-11-14 RX ADMIN — PANTOPRAZOLE SODIUM 40 MG: 20 TABLET, DELAYED RELEASE ORAL at 20:07

## 2021-11-14 RX ADMIN — SERTRALINE 300 MG: 100 TABLET, FILM COATED ORAL at 10:48

## 2021-11-14 RX ADMIN — GABAPENTIN 800 MG: 400 CAPSULE ORAL at 10:48

## 2021-11-14 RX ADMIN — Medication 1 MG: at 00:58

## 2021-11-14 RX ADMIN — ACETAMINOPHEN 650 MG: 325 TABLET ORAL at 01:03

## 2021-11-14 RX ADMIN — SODIUM CHLORIDE: 9 INJECTION, SOLUTION INTRAVENOUS at 00:50

## 2021-11-14 RX ADMIN — ATORVASTATIN CALCIUM 20 MG: 10 TABLET, FILM COATED ORAL at 21:33

## 2021-11-14 RX ADMIN — TIZANIDINE 2 MG: 2 TABLET ORAL at 21:33

## 2021-11-14 RX ADMIN — FAMOTIDINE 20 MG: 20 TABLET ORAL at 20:07

## 2021-11-14 RX ADMIN — CEFTRIAXONE SODIUM 2 G: 2 INJECTION, POWDER, FOR SOLUTION INTRAMUSCULAR; INTRAVENOUS at 22:10

## 2021-11-14 RX ADMIN — ENOXAPARIN SODIUM 40 MG: 100 INJECTION SUBCUTANEOUS at 00:57

## 2021-11-14 RX ADMIN — ACETAMINOPHEN 650 MG: 325 TABLET ORAL at 21:32

## 2021-11-14 RX ADMIN — CLOZAPINE 300 MG: 100 TABLET ORAL at 20:17

## 2021-11-14 RX ADMIN — GABAPENTIN 800 MG: 400 CAPSULE ORAL at 20:07

## 2021-11-14 RX ADMIN — AMLODIPINE BESYLATE 5 MG: 5 TABLET ORAL at 12:35

## 2021-11-14 RX ADMIN — ACETAMINOPHEN 650 MG: 325 TABLET ORAL at 10:42

## 2021-11-14 RX ADMIN — DEXTROSE MONOHYDRATE 25 ML: 25 INJECTION, SOLUTION INTRAVENOUS at 03:03

## 2021-11-14 RX ADMIN — LAMOTRIGINE 300 MG: 150 TABLET ORAL at 21:32

## 2021-11-14 ASSESSMENT — ACTIVITIES OF DAILY LIVING (ADL)
ADLS_ACUITY_SCORE: 9
ADLS_ACUITY_SCORE: 9
ADLS_ACUITY_SCORE: 7
ADLS_ACUITY_SCORE: 5
ADLS_ACUITY_SCORE: 7
ADLS_ACUITY_SCORE: 9
ADLS_ACUITY_SCORE: 7
ADLS_ACUITY_SCORE: 5
ADLS_ACUITY_SCORE: 7
ADLS_ACUITY_SCORE: 5
ADLS_ACUITY_SCORE: 7

## 2021-11-14 NOTE — PROGRESS NOTES
[facility] Medicine PROGRESS NOTE      Identification/Summary: Sunil Rondon is a 36 year old male with a past medical history of developmental delay, anxiety, depression, GERD, DM 2, obstructive sleep apnea noncompliant with CPAP, morbid obesity BMI 62 presented with shortness of breath, cough who was admitted on 11/13/2021 for left-sided community-acquired pneumonia noted on CT, leukocytosis.  He was found to be hypoglycemic with a blood sugars of 54.  Started on D5 normal saline.  Last A1c 6.6.    Assessment and Plan:  Community-acquired pneumonia  Acute respiratory failure  Afebrile. Leukocytosis 15.1  Continue ceftriaxone, azithromycin  Check sputum cultures  Follow-up on respiratory panel.  On 4 L of oxygen  Wean off as tolerated.  Rapid flu, Covid negative.    Diabetes mellitus type 2  Hypoglycemia  Likely from poor oral intake.  Last A1c 8/9/2021 6.6  Hold off on oral hypoglycemics, Lantus  If the blood sugars are trending upwards above 150 we will stop D5  Insulin sliding scale as needed until oral intake improves    History of anxiety depression  Bipolar versus schizoaffective disorder  Developmental delay  On multiple psych meds continued    Morbid obesity BMI 62  Obstructive sleep apnea  Noncompliant with CPAP  Oxygen as needed    Hypertension  Continue home medications with holding parameters  On amlodipine losartan, metoprolol    GERD  Continue Pepcid, Carafate, Protonix    History of low back pain  on tizanidine, gabapentin    Diet: Combination Diet Regular Diet Adult  DVT Prophylaxis: On Lovenox   Full Code    Anticipated possible discharge in 1-2 days once clinical improvement milestones are met.    Interval History/Subjective:  He has a cough, headache, shortness of breath. he has a flat affect.     Physical Exam/Objective:  Vitals I/O   Vital signs:  Temp: 98  F (36.7  C) Temp src: Oral BP: (!) 126/96 Pulse: 92   Resp: 17 SpO2: 96 % O2 Device: Nasal cannula Oxygen Delivery: 4 LPM Height:  "190.5 cm (6' 3\") Weight: (!) 228 kg (502 lb 11.2 oz)  Estimated body mass index is 62.83 kg/m  as calculated from the following:    Height as of this encounter: 1.905 m (6' 3\").    Weight as of this encounter: 228 kg (502 lb 11.2 oz).     No intake/output data recorded.     Body mass index is 62.83 kg/m .    General Appearance:  Alert, cooperative, morbidly obese no distress   Head:  Normocephalic, atraumatic   Eyes:  PERRL    Throat:  mucosa; moist   Neck: No JVD, thyromegaly   Lungs:   Clear to auscultation bilaterally, no obvious rhonchi or wheezing respirations unlabored   Chest Wall:  No tenderness or deformity   Heart:  Regular rate and rhythm, S1, S2 normal,no murmur   Abdomen:   Soft, non tender obese, non distended, bowel sounds present, no guarding or rigidity   Extremities: No edema, no joint swelling   Skin: Skin color, texture, turgor normal, no rashes or lesions   Neurologic: Alert and oriented X 3, Moves all 4 extremities       Medications:   Personally Reviewed.    amLODIPine  5 mg Oral Daily     atorvastatin  20 mg Oral At Bedtime     azithromycin  500 mg Intravenous Q24H     cefTRIAXone  2 g Intravenous Q24H     cloZAPine  50 mg Oral At Bedtime     cloZAPine  300 mg Oral At Bedtime     enoxaparin ANTICOAGULANT  40 mg Subcutaneous Q12H     famotidine  20 mg Oral BID     gabapentin  800 mg Oral BID     [Held by provider] insulin aspart  1-7 Units Subcutaneous TID AC     [Held by provider] insulin aspart  1-5 Units Subcutaneous At Bedtime     lamoTRIgine  300 mg Oral At Bedtime     losartan  100 mg Oral Daily     metoprolol succinate ER  200 mg Oral Daily     pantoprazole  40 mg Oral BID     sertraline  300 mg Oral Daily     sodium chloride (PF)  3 mL Intracatheter Q8H     sucralfate  1 g Oral 4x Daily     tiZANidine  2 mg Oral At Bedtime       Data reviewed today: I personally reviewed all new medications, labs, imaging/diagnostics reports over the past 24 hours. Pertinent findings " include    Labs:  Most Recent 3 CBC's:Recent Labs   Lab Test 11/14/21  0056 03/25/20  1606 04/01/19  1055   WBC 15.1* 14.1* 10.2   HGB 12.1* 13.3* 12.7*   MCV 76* 84 85    258 252     Most Recent 3 BMP's:Recent Labs   Lab Test 11/14/21  0826 11/14/21  0602 11/14/21  0431 11/14/21  0138 11/14/21  0056 11/14/21  0051 03/25/20  1606 03/01/20  2305   NA  --   --   --   --  139  --  139 139   POTASSIUM  --   --   --   --  3.8  --  4.1 4.2   CHLORIDE  --   --   --   --  104  --  102 104   CO2  --   --   --   --  24  --  27 25   BUN  --   --   --   --  12  --  15 14   CR  --   --   --   --  0.81  --  0.72 0.81   ANIONGAP  --   --   --   --  11  --  10 10   SANAZ  --   --   --   --  8.9  --  9.6 9.5   * 89 93   < > 54*   < > 74 153*    < > = values in this interval not displayed.     Most Recent 2 LFT's:Recent Labs   Lab Test 11/14/21 0056 03/01/20  2305   AST 17 21   ALT 26 33   ALKPHOS 86 118   BILITOTAL 0.2 0.3     Most Recent 3 INR's:No lab results found.  Most Recent 3 Troponin's:No lab results found.  Most Recent Hemoglobin A1c:Recent Labs   Lab Test 12/31/18  0537   A1C 6.7*     Most Recent 6 glucoses:Recent Labs   Lab Test 11/14/21  0826 11/14/21  0602 11/14/21  0431 11/14/21  0402 11/14/21  0330 11/14/21  0253   * 89 93 97 105* 75       Imaging:   No results found for this or any previous visit (from the past 24 hour(s)).    EKG: Personally reviewed.  Sinus tachycardia 100/min no other acute ST-T wave changes.    Kacey Morris MD  Hospitalist  [facility]

## 2021-11-14 NOTE — PROGRESS NOTES
"Pt states, \"I'm feeling anxious.\"  He takes gabapentin for anxiety and meds weren't available yet.  He has now taken his medication and appears to be less anxious.  Will continue to monitor.  "

## 2021-11-14 NOTE — ED PROVIDER NOTES
Expected Patient Referral to ED  9:54 PM    Referring Clinic/Provider:  urgency room    Reason for referral/Clinical facts:  URI symptoms for a week.  Arrived with sats in the mid 80s.  On 3 L with good saturations.  CT with focal pneumonia.  Given Rocephin and Zithromax.  Will require hospitalization but no beds available.  Recommendations provided:  Board    Informed caller that recommendations provided are recommendations based only on the facts provided and that they responsible to accept or reject the advice, or to seek a formal in person consultation as needed and that this ED will see/treat patient should they arrive.      Shaheed Stallings MD  Emergency Medicine  [facility] EMERGENCY ROOM  [facility] [facility] [address] [phone]     Shaheed Stallings MD  11/14/21 0155

## 2021-11-14 NOTE — ED NOTES
Patient's oxygen was normalized and supplemental oxygen was turned down to 5 liters.  Patient remained asleep throughout the duration.  donte

## 2021-11-14 NOTE — ED PROVIDER NOTES
EMERGENCY DEPARTMENT ENCOUNTER      NAME: Sunil Rondon  AGE: 36 year old male  YOB: 1985  MRN: [id number]  EVALUATION DATE & TIME: 11/13/2021 10:53 PM    PCP: Bertrand Valle    ED PROVIDER: Shaheed Stallings M.D.      Chief Complaint   Patient presents with     Cough         FINAL IMPRESSION:  1. Community acquired pneumonia of left lower lobe of lung          ED COURSE & MEDICAL DECISION MAKING:    Pertinent Labs & Imaging studies reviewed. (See chart for details)  36 year old male presents to the Emergency Department for hospitalization.  Patient has been ill for last few weeks.  Over the last 2 days get increasingly short of breath with worsening cough.  Patient presented to the emergency room.  Extensive evaluation there performed.  White cell count mildly elevated at 15.6.  Electrolytes essentially unremarkable.  Glucose mildly elevated 158.  Lactic acid minimally elevated 2.9.  CT imaging with left lower lobe infiltrate consistent with community-acquired pneumonia.  Patient started on Zithromax and Rocephin.  Covid swabs and influenza swabs negative.  However patient with hypoxia on room air of 84%.  Patient placed on 4 L with good oxygenation at 94%.  Patient unable to be made a direct admit secondary to absence of bed.  Presents to the emergency room for boarding and subsequent hospitalization.. Patient appears non toxic with stable vitals signs.     11:07 PM I met with the patient for the initial interview and physical examination. Discussed plan for treatment and workup in the ED.    11:14 PM I spoke with the hospitalist regarding patient.  He is agreeable with plan for hospitalization.    At the conclusion of the encounter I discussed the results of all of the tests and the disposition. The questions were answered and return precautions provided. The patient or family acknowledged understanding and was agreeable with the care plan.       PPE: Provider wore gloves, N95 mask, eye protection,  surgical cap, and paper mask.     MEDICATIONS GIVEN IN THE EMERGENCY:  Medications - No data to display    NEW PRESCRIPTIONS STARTED AT TODAY'S ER VISIT  New Prescriptions    No medications on file          =================================================================    HPI    Patient information was obtained from: Patient    Use of Intrepreter: N/A         Sunil Rondon is a 36 year old male with a pertient medical history of developmental delay, schizoaffective disorder, bipolar order, diabetes mellitus type 2, hypertension, dyslipidemia, and morbid obesity who presents to the ED for evaluation of a cough.    Patient reports a cough, sore throat, body aches, and a headache for the past several days. He also notes one episode of vomiting at ~4:30 PM today. He was sent from the urgency room for pneumonia and sepsis. Patient is reported to be COVID negative. Denies fever, diarrhea, or any other complaints at this time.     REVIEW OF SYSTEMS   Constitutional:  Denies fever, chills  ENT: Positive for sore throat.  Respiratory:  Denies increased work of breathing. Positive for cough  Cardiovascular:  Denies chest pain, palpitations  GI:  Denies abdominal pain, nausea, or change in bowel or bladder habits. Positive for vomiting  Musculoskeletal:  Denies any new joint swelling. Positive for body aches  Skin:  Denies rash   Neurologic:  Denies focal weakness. Positive for headache  All systems negative except as marked.     PAST MEDICAL HISTORY:  Past Medical History:   Diagnosis Date     Anxiety      Bipolar 1 disorder (H)      Borderline mental retardation      Depression      Diabetes mellitus (H)      Gender identity disorder      GERD (gastroesophageal reflux disease)      Glucose intolerance (impaired glucose tolerance)      Hypertension      Obesity      Panic attacks      Schizoaffective disorder (H)      Sinus tarsi syndrome      Sleep apnea      Suicidal ideation        PAST SURGICAL HISTORY:  Past Surgical  History:   Procedure Laterality Date     HC KNEE SCOPE,SINGLE MENISECTOMY Left 6/8/2016    Procedure: LEFT KNEE ARTHROSCOPY, PARTIAL MEDIAL MENISCECTOMY, LOOSE BODY REMOVAL, CHONDROPLASTY;  Surgeon: Sky Goodwin MD;  Location: [facility];  Service: Orthopedics     OPEN REDUCTION INTERNAL FIXATION ANKLE Left 12/31/2018    Procedure: OPEN REDUCTION INTERNAL FIXATION, FRACTURE, LEFT ANKLE;  Surgeon: Jarad Mosqueda MD;  Location: [facility];  Service: Orthopedics     TENDON REPAIR Left     wrist     UPPER GASTROINTESTINAL ENDOSCOPY           CURRENT MEDICATIONS:    No current facility-administered medications for this encounter.    Current Outpatient Medications:      acetaminophen (TYLENOL) 500 MG tablet, [ACETAMINOPHEN (TYLENOL) 500 MG TABLET] Take 2 tablets (1,000 mg total) by mouth 3 (three) times a day., Disp: , Rfl: 0     amLODIPine (NORVASC) 5 MG tablet, [AMLODIPINE (NORVASC) 5 MG TABLET] Take 1 tablet (5 mg total) by mouth daily., Disp: 30 tablet, Rfl: 0     benzocaine-menthol (CEPACOL) 15-3.6 mg, [BENZOCAINE-MENTHOL (CEPACOL) 15-3.6 MG] Take 1 lozenge by mouth every hour as needed., Disp: , Rfl: 0     cetirizine (ZYRTEC) 10 MG tablet, [CETIRIZINE (ZYRTEC) 10 MG TABLET] Take 10 mg by mouth daily., Disp: , Rfl:      cloZAPine (CLOZARIL) 100 MG tablet, [CLOZAPINE (CLOZARIL) 100 MG TABLET] Take 3 tablets (300 mg total) by mouth at bedtime. May use home supply, Disp: 42 tablet, Rfl: 0     cyclobenzaprine (FLEXERIL) 10 MG tablet, [CYCLOBENZAPRINE (FLEXERIL) 10 MG TABLET] Take 10 mg by mouth 2 (two) times a day., Disp: , Rfl:      docusate sodium (COLACE) 100 MG capsule, [DOCUSATE SODIUM (COLACE) 100 MG CAPSULE] Take 1 capsule (100 mg total) by mouth 2 (two) times a day as needed for constipation., Disp: , Rfl: 0     doxycycline (MONODOX) 100 MG capsule, [DOXYCYCLINE (MONODOX) 100 MG CAPSULE] Take 100 mg by mouth daily., Disp: , Rfl:      enoxaparin (LOVENOX) 40 mg/0.4 mL syringe, [ENOXAPARIN  (LOVENOX) 40 MG/0.4 ML SYRINGE] Inject 0.4 mL (40 mg total) under the skin every 12 (twelve) hours., Disp: 60 Syringe, Rfl: 0     ergocalciferol (ERGOCALCIFEROL) 50,000 unit capsule, [ERGOCALCIFEROL (ERGOCALCIFEROL) 50,000 UNIT CAPSULE] Take 50,000 Units by mouth 2 (two) times a week. Monday and Thursday      , Disp: , Rfl:      famotidine (PEPCID) 20 MG tablet, [FAMOTIDINE (PEPCID) 20 MG TABLET] Take 20 mg by mouth 2 (two) times a day., Disp: , Rfl:      ferrous gluconate 324 mg (37.5 mg iron) Tab, [FERROUS GLUCONATE 324 MG (37.5 MG IRON) TAB] Take 1 tablet by mouth 3 (three) times a day with meals., Disp: , Rfl:      gabapentin (NEURONTIN) 600 MG tablet, [GABAPENTIN (NEURONTIN) 600 MG TABLET] Take 600 mg by mouth 3 (three) times a day as needed., Disp: , Rfl:      gabapentin (NEURONTIN) 600 MG tablet, [GABAPENTIN (NEURONTIN) 600 MG TABLET] Take 600 mg by mouth at bedtime.       , Disp: , Rfl:      lamoTRIgine (LAMICTAL) 150 MG tablet, [LAMOTRIGINE (LAMICTAL) 150 MG TABLET] Take 300 mg by mouth at bedtime.       , Disp: , Rfl:      losartan (COZAAR) 100 MG tablet, [LOSARTAN (COZAAR) 100 MG TABLET] Take 100 mg by mouth daily., Disp: , Rfl:      melatonin 3 mg Tab, [MELATONIN 3 MG TAB] Take 6 mg by mouth at bedtime.       , Disp: , Rfl:      metFORMIN (GLUCOPHAGE) 500 MG tablet, [METFORMIN (GLUCOPHAGE) 500 MG TABLET] Take 1,000 mg by mouth 2 (two) times a day with meals., Disp: , Rfl:      OMEGA-3/DHA/EPA/FISH OIL (FISH OIL-OMEGA-3 FATTY ACIDS) 300-1,000 mg capsule, [OMEGA-3/DHA/EPA/FISH OIL (FISH OIL-OMEGA-3 FATTY ACIDS) 300-1,000 MG CAPSULE] Take 1 g by mouth 2 (two) times a day., Disp: , Rfl:      omeprazole (PRILOSEC) 40 MG capsule, [OMEPRAZOLE (PRILOSEC) 40 MG CAPSULE] Take 40 mg by mouth 2 (two) times a day., Disp: , Rfl:      ondansetron (ZOFRAN-ODT) 8 MG disintegrating tablet, [ONDANSETRON (ZOFRAN-ODT) 8 MG DISINTEGRATING TABLET] Take 8 mg by mouth every 8 (eight) hours as needed for nausea., Disp: , Rfl:       oxyCODONE (ROXICODONE) 5 MG immediate release tablet, [OXYCODONE (ROXICODONE) 5 MG IMMEDIATE RELEASE TABLET] Take 1 tablet (5 mg total) by mouth every 6 (six) hours as needed., Disp: 40 tablet, Rfl: 0     polyethylene glycol (MIRALAX) 17 gram packet, [POLYETHYLENE GLYCOL (MIRALAX) 17 GRAM PACKET] Take 17 g by mouth daily as needed., Disp: , Rfl:      senna (SENOKOT) 8.6 mg tablet, [SENNA (SENOKOT) 8.6 MG TABLET] Take 1 tablet by mouth daily., Disp: , Rfl:      sertraline (ZOLOFT) 100 MG tablet, [SERTRALINE (ZOLOFT) 100 MG TABLET] Take 300 mg by mouth daily., Disp: , Rfl:      simethicone (MYLICON) 80 MG chewable tablet, [SIMETHICONE (MYLICON) 80 MG CHEWABLE TABLET] Chew 80 mg every 6 (six) hours as needed for flatulence., Disp: , Rfl:      tolterodine (DETROL LA) 4 MG ER capsule, [TOLTERODINE (DETROL LA) 4 MG ER CAPSULE] Take 4 mg by mouth daily., Disp: , Rfl:     ALLERGIES:  No Known Allergies    FAMILY HISTORY:  History reviewed. No pertinent family history.    SOCIAL HISTORY:   Social History     Socioeconomic History     Marital status: Single     Spouse name: Not on file     Number of children: Not on file     Years of education: Not on file     Highest education level: Not on file   Occupational History     Not on file   Tobacco Use     Smoking status: Never Smoker     Smokeless tobacco: Never Used   Substance and Sexual Activity     Alcohol use: No     Drug use: No     Sexual activity: Not on file   Other Topics Concern     Not on file   Social History Narrative     Not on file     Social Determinants of Health     Financial Resource Strain: Not on file   Food Insecurity: Not on file   Transportation Needs: Not on file   Physical Activity: Not on file   Stress: Not on file   Social Connections: Not on file   Intimate Partner Violence: Not on file   Housing Stability: Not on file       VITALS:  Patient Vitals for the past 24 hrs:   BP Temp Temp src Pulse Resp SpO2 Height Weight   11/13/21 2301 109/61 98.9  F  "(37.2  C) Oral 102 24 94 % 1.905 m (6' 3\") (!) 228 kg (502 lb 11.2 oz)        PHYSICAL EXAM    Constitutional:  Awake, alert, mild distress, super morbidly obese male  HENT:  Normocephalic, Atraumatic. Bilateral external ears normal. Oropharynx moist. Nose normal. Neck- Normal range of motion with no guarding, No midline cervical tenderness, Supple, No stridor.   Eyes:  PERRL, EOMI with no signs of entrapment, Conjunctiva normal, No discharge.   Respiratory:  Normal breath sounds, No respiratory distress, No wheezing. Bronchitic cough  Cardiovascular:  Normal heart rate, Normal rhythm, No appreciable rubs or gallops.   GI:  Soft, No tenderness, No distension, No palpable masses  Musculoskeletal: Good range of motion in all major joints. No tenderness to palpation or major deformities noted. Mild lower extremity edema.  Integument:  Warm, Dry, No erythema, No rash.   Neurologic:  Alert & oriented, Normal motor function, Normal sensory function, No focal deficits noted.   Psychiatric:  Affect normal, Judgment normal, Mood normal.     ECG independent review and interpretation:  Sinus tachycardia.  Rate of 105.  Normal QRS.  Normal ST segments.  When compared to October 1, 2021 no significant change    I, Sana Feng, am serving as a scribe to document services personally performed by Shaheed Stallings MD, based on my observation and the provider's statements to me. I, Shaheed Stallings MD attest that Sana Feng is acting in a scribe capacity, has observed my performance of the services and has documented them in accordance with my direction.    Shaheed Stallings M.D.  Emergency Medicine  [facility] EMERGENCY ROOM     Shaheed Stallings MD  11/13/21 8432       Shaheed Stallings MD  11/14/21 0156    "

## 2021-11-14 NOTE — ED NOTES
Bed: [facility]  Expected date: 11/13/21  Expected time: 10:47 PM  Means of arrival: Ambulance  Comments:  Sepsis, pneumonia

## 2021-11-14 NOTE — PHARMACY-ADMISSION MEDICATION HISTORY
Pharmacy Note - Admission Medication History    Pertinent Provider Information:   Spoke with home health nurse (Naty: [phone]) who comes in every Tuesday to set up his medications.      ______________________________________________________________________    Prior To Admission (PTA) med list completed and updated in EMR.       PTA Med List   Medication Sig Note Last Dose     albuterol (PROAIR HFA/PROVENTIL HFA/VENTOLIN HFA) 108 (90 Base) MCG/ACT inhaler Inhale 2 puffs into the lungs every 6 hours as needed  11/14/2021: Requesting a refill More than a month at Unknown time     amLODIPine (NORVASC) 5 MG tablet [AMLODIPINE (NORVASC) 5 MG TABLET] Take 1 tablet (5 mg total) by mouth daily.  11/13/2021 at Unknown time     atorvastatin (LIPITOR) 20 MG tablet Take 20 mg by mouth At Bedtime  11/12/2021     canagliflozin (INVOKANA) 300 MG tablet Take 300 mg by mouth every morning (before breakfast)  11/13/2021 at Unknown time     cloZAPine (CLOZARIL) 100 MG tablet [CLOZAPINE (CLOZARIL) 100 MG TABLET] Take 3 tablets (300 mg total) by mouth at bedtime. May use home supply  11/12/2021     cloZAPine (CLOZARIL) 50 MG tablet Take 50 mg by mouth At Bedtime 11/14/2021: Total of 350 qhs 11/12/2021     famotidine (PEPCID) 20 MG tablet [FAMOTIDINE (PEPCID) 20 MG TABLET] Take 20 mg by mouth 2 (two) times a day.  11/13/2021 at am dose     gabapentin (NEURONTIN) 800 MG tablet Take 800 mg by mouth daily as needed (anxiety)   Past Week at Unknown time     gabapentin (NEURONTIN) 800 MG tablet Take 800 mg by mouth 2 times daily   11/13/2021 at am dose     glipiZIDE (GLUCOTROL XL) 10 MG 24 hr tablet Take 10 mg by mouth daily  11/13/2021 at Unknown time     ibuprofen (ADVIL/MOTRIN) 200 MG tablet Take 600 mg by mouth every 6 hours as needed for mild pain  Unknown at Unknown time     insulin glargine (LANTUS PEN) 100 UNIT/ML pen Inject 30 Units Subcutaneous At Bedtime  11/12/2021     lamoTRIgine (LAMICTAL) 150 MG tablet [LAMOTRIGINE  (LAMICTAL) 150 MG TABLET] Take 300 mg by mouth at bedtime.         11/12/2021     liraglutide (VICTOZA) 18 MG/3ML solution Inject 1.8 mg Subcutaneous daily  11/13/2021 at Unknown time     losartan (COZAAR) 100 MG tablet [LOSARTAN (COZAAR) 100 MG TABLET] Take 100 mg by mouth daily.  11/13/2021 at Unknown time     metFORMIN (GLUCOPHAGE) 1000 MG tablet Take 1,000 mg by mouth 2 times daily (with meals)   11/13/2021 at am dose     metoprolol succinate ER (TOPROL-XL) 200 MG 24 hr tablet Take 200 mg by mouth daily  11/13/2021 at Unknown time     omeprazole (PRILOSEC) 40 MG capsule [OMEPRAZOLE (PRILOSEC) 40 MG CAPSULE] Take 40 mg by mouth 2 (two) times a day.  11/13/2021 at Unknown time     polyethylene glycol (MIRALAX) 17 gram packet [POLYETHYLENE GLYCOL (MIRALAX) 17 GRAM PACKET] Take 17 g by mouth daily as needed.  Unknown at Unknown time     sertraline (ZOLOFT) 100 MG tablet [SERTRALINE (ZOLOFT) 100 MG TABLET] Take 300 mg by mouth daily.  11/13/2021 at Unknown time     sucralfate (CARAFATE) 1 GM tablet Take 1 g by mouth 4 times daily  11/13/2021 at Unknown time     tiZANidine (ZANAFLEX) 2 MG tablet Take 2 mg by mouth At Bedtime  11/13/2021 at Unknown time       Information source(s): Patient, Caregiver and [facility]  Method of interview communication: in-person and phone    Summary of Changes to PTA Med List  New: albuterol, Invokana, atorvastatin, Lantus, Victoza, metoprolol, sucralfate, glipizide, tizanidine, ibuprofen  Discontinued: cetirizine, Cepacol, Flexeril, Colace, doxycycline, enoxaparin, ergocalciferol, ferrous gluconate, meltatonin, fish oil, ondansetron, oxycodone, senna, simethicone, tolterodine  Changed: clozapine dose, gabapentin dose, metformin tablet strenght    Patient was asked about OTC/herbal products specifically.  PTA med list reflects this.    In the past week, patient estimated taking medication this percent of the time:  greater than 90%.    Allergies were reviewed, assessed,  and updated with the patient.      Patient did not bring any medications to the hospital and can't retrieve from home. No multi-dose medications are available for use during hospital stay.     The information provided in this note is only as accurate as the sources available at the time of the update(s).    Thank you for the opportunity to participate in the care of this patient.    Emilie De La Garza  11/14/2021 10:03 AM

## 2021-11-14 NOTE — H&P
[facility] MEDICINE ADMISSION HISTORY AND PHYSICAL       Assessment & Plan      1. Cough and SOB - Pneumonia -- CT scan showed -- Foci of airspace disease and groundglass opacity in the left hemithorax most consistent with pneumonia.    Mild lactic acidosis, with WBC of 15 thousand. Possible early sepsis     He is Flu and COVID negative     - Continue Antibiotics - Rocephin/Azithromycin   - Cough suppressants, and also nebs  - sputum culture, blood culture, and urinary antigens   - procal, CBC     2. DM2 - hold metformin. sliding scale insulin/FS, resume home dose insulin if cleared by pharm     3. Has Bipolar, Schizoaffective, Bordeline MR - resume meds     4. BMI of 60 -- The patient has morbid obesity with BMI > 40, affecting the patient s current medical condition. This has required use of extra resources, including treatment, assistance from extra staff, and the usage of bariatric equipment (wheelchair, bed, BP cuff, scales, etc.).    4AM -- Having Low sugars in the 50s. Holding sliding scale insulin        VTE prophylaxis: LMWH   IV fluids: Per order set   Diet:  DM   GI prophylaxis: Not indicated at this point, re-assess if needed.   Pain, sleep, and vomiting medications: Per order set  Code Status: Full   COVID test result:  negative   COVID vaccination: completed   Barriers to discharge: admitting clinical condition  Discharge Disposition and goals:  Unable to determine at this point, pending clinical progress and response to treatment. Patient may need transfer to SNF or ACR if unsafe to go home and needed treatment inappropriate at home setting OR may need home health care evaluation if care can be delivered at home settings. Consider referral to care manager/    PPE - I was wearing PPE when I met the patient - N95 mask, Surgical mask, Isolation gown, Gloves, Safety glasses.      Care plan was created based on available information provided, including patient's  condition at the time of encounter.   This plan was discussed with patient and/or family members using layman's terms and have agreed to proceed.   At the end of night shift (9PM - 730A), this case will be presented to the AM Hospitalist.    It is recommended to revise care plan if there is change in condition and/or new clinical information that are not available during my encounter.     All or some of home medication/s were not resumed on admission due to safety reasons or contraindications. Dosing and frequency may also have been modified. Please resume/review them during your visit.     70 minutes of total visit duration and greater than 50% was spent in direct evaluation of patient and coordination of care including discussion of diagnostic test results and recommended treatment. .      Rodrigue Santiago MD, MPH, FACP, [facility]  Internal Medicine - Hospitalist        Chief Complaint Cough      HISTORY     - Met him in the ED. He was awake and interactive. He came in because of cough and SOB. He said, its been lingering for several days and worse in the last 2 days. No chest pain. No leg swelling. No fevers. He also reported nasal congestion and sore throat. He also reported vomiting once but no belly pain. It may happen while he was coughing. He has pain under rib cage.    - No lung issue. He has BMI of 60    - He was seen at the Urgent care and PE study was done -- No PE but found to have PNA. He was given Rocephin and Azithromycn - His WBC was 15 thou and he has lactic acidosis of 2.9     11/13/2021 2205 azithromycin (ZITHROMAX) 100 mg/mL injection 500 mg 500 mg Intravenous New Bag   11/13/2021 2105 cefTRIAXone (ROCEPHIN) injection 2 g 0 g Intravenous Stopped   11/13/2021 2150 cefTRIAXone (ROCEPHIN) injection 2 g 2 g Intravenous New Bag      IMPRESSION:   1.  Foci of airspace disease and groundglass opacity in the left hemithorax most consistent with pneumonia. Follow-up noncontrast CT recommended in 3 months to  ensure resolution and exclude other underlying pathology.   2.  Trace pericardial effusion.   3.  No pulmonary embolus aortic aneurysm or dissection.   4.  Hepatic steatosis.    - ROS --- No headache. No dizziness. No weakness. . No palpitations.  No urinary symptoms. No bleeding symptoms. No weight loss. Rest of 12 point ROS was reviewed and negative.       Past Medical History     Past Medical History:   Diagnosis Date     Anxiety      Bipolar 1 disorder (H)      Borderline mental retardation      Depression      Diabetes mellitus (H)      Gender identity disorder      GERD (gastroesophageal reflux disease)      Glucose intolerance (impaired glucose tolerance)      Hypertension      Obesity      Panic attacks      Schizoaffective disorder (H)      Sinus tarsi syndrome      Sleep apnea      Suicidal ideation          Surgical History     Past Surgical History:   Procedure Laterality Date     HC KNEE SCOPE,SINGLE MENISECTOMY Left 6/8/2016    Procedure: LEFT KNEE ARTHROSCOPY, PARTIAL MEDIAL MENISCECTOMY, LOOSE BODY REMOVAL, CHONDROPLASTY;  Surgeon: Sky Goodwin MD;  Location: [facility];  Service: Orthopedics     OPEN REDUCTION INTERNAL FIXATION ANKLE Left 12/31/2018    Procedure: OPEN REDUCTION INTERNAL FIXATION, FRACTURE, LEFT ANKLE;  Surgeon: Jarad Mosqueda MD;  Location: [facility] OR;  Service: Orthopedics     TENDON REPAIR Left     wrist     UPPER GASTROINTESTINAL ENDOSCOPY          Family History      History reviewed. No pertinent family history.      Social History      .  Social History     Socioeconomic History     Marital status: Single     Spouse name: Not on file     Number of children: Not on file     Years of education: Not on file     Highest education level: Not on file   Occupational History     Not on file   Tobacco Use     Smoking status: Never Smoker     Smokeless tobacco: Never Used   Substance and Sexual Activity     Alcohol use: No     Drug use: No     Sexual activity: Not  on file   Other Topics Concern     Not on file   Social History Narrative     Not on file     Social Determinants of Health     Financial Resource Strain: Not on file   Food Insecurity: Not on file   Transportation Needs: Not on file   Physical Activity: Not on file   Stress: Not on file   Social Connections: Not on file   Intimate Partner Violence: Not on file   Housing Stability: Not on file          Allergies      No Known Allergies      Prior to Admission Medications      No current facility-administered medications on file prior to encounter.  acetaminophen (TYLENOL) 500 MG tablet, [ACETAMINOPHEN (TYLENOL) 500 MG TABLET] Take 2 tablets (1,000 mg total) by mouth 3 (three) times a day.  amLODIPine (NORVASC) 5 MG tablet, [AMLODIPINE (NORVASC) 5 MG TABLET] Take 1 tablet (5 mg total) by mouth daily.  benzocaine-menthol (CEPACOL) 15-3.6 mg, [BENZOCAINE-MENTHOL (CEPACOL) 15-3.6 MG] Take 1 lozenge by mouth every hour as needed.  cetirizine (ZYRTEC) 10 MG tablet, [CETIRIZINE (ZYRTEC) 10 MG TABLET] Take 10 mg by mouth daily.  cloZAPine (CLOZARIL) 100 MG tablet, [CLOZAPINE (CLOZARIL) 100 MG TABLET] Take 3 tablets (300 mg total) by mouth at bedtime. May use home supply  cyclobenzaprine (FLEXERIL) 10 MG tablet, [CYCLOBENZAPRINE (FLEXERIL) 10 MG TABLET] Take 10 mg by mouth 2 (two) times a day.  docusate sodium (COLACE) 100 MG capsule, [DOCUSATE SODIUM (COLACE) 100 MG CAPSULE] Take 1 capsule (100 mg total) by mouth 2 (two) times a day as needed for constipation.  doxycycline (MONODOX) 100 MG capsule, [DOXYCYCLINE (MONODOX) 100 MG CAPSULE] Take 100 mg by mouth daily.  enoxaparin (LOVENOX) 40 mg/0.4 mL syringe, [ENOXAPARIN (LOVENOX) 40 MG/0.4 ML SYRINGE] Inject 0.4 mL (40 mg total) under the skin every 12 (twelve) hours.  ergocalciferol (ERGOCALCIFEROL) 50,000 unit capsule, [ERGOCALCIFEROL (ERGOCALCIFEROL) 50,000 UNIT CAPSULE] Take 50,000 Units by mouth 2 (two) times a week. Monday and Thursday        famotidine (PEPCID) 20 MG  tablet, [FAMOTIDINE (PEPCID) 20 MG TABLET] Take 20 mg by mouth 2 (two) times a day.  ferrous gluconate 324 mg (37.5 mg iron) Tab, [FERROUS GLUCONATE 324 MG (37.5 MG IRON) TAB] Take 1 tablet by mouth 3 (three) times a day with meals.  gabapentin (NEURONTIN) 600 MG tablet, [GABAPENTIN (NEURONTIN) 600 MG TABLET] Take 600 mg by mouth 3 (three) times a day as needed.  gabapentin (NEURONTIN) 600 MG tablet, [GABAPENTIN (NEURONTIN) 600 MG TABLET] Take 600 mg by mouth at bedtime.         lamoTRIgine (LAMICTAL) 150 MG tablet, [LAMOTRIGINE (LAMICTAL) 150 MG TABLET] Take 300 mg by mouth at bedtime.         losartan (COZAAR) 100 MG tablet, [LOSARTAN (COZAAR) 100 MG TABLET] Take 100 mg by mouth daily.  melatonin 3 mg Tab, [MELATONIN 3 MG TAB] Take 6 mg by mouth at bedtime.         metFORMIN (GLUCOPHAGE) 500 MG tablet, [METFORMIN (GLUCOPHAGE) 500 MG TABLET] Take 1,000 mg by mouth 2 (two) times a day with meals.  OMEGA-3/DHA/EPA/FISH OIL (FISH OIL-OMEGA-3 FATTY ACIDS) 300-1,000 mg capsule, [OMEGA-3/DHA/EPA/FISH OIL (FISH OIL-OMEGA-3 FATTY ACIDS) 300-1,000 MG CAPSULE] Take 1 g by mouth 2 (two) times a day.  omeprazole (PRILOSEC) 40 MG capsule, [OMEPRAZOLE (PRILOSEC) 40 MG CAPSULE] Take 40 mg by mouth 2 (two) times a day.  ondansetron (ZOFRAN-ODT) 8 MG disintegrating tablet, [ONDANSETRON (ZOFRAN-ODT) 8 MG DISINTEGRATING TABLET] Take 8 mg by mouth every 8 (eight) hours as needed for nausea.  oxyCODONE (ROXICODONE) 5 MG immediate release tablet, [OXYCODONE (ROXICODONE) 5 MG IMMEDIATE RELEASE TABLET] Take 1 tablet (5 mg total) by mouth every 6 (six) hours as needed.  polyethylene glycol (MIRALAX) 17 gram packet, [POLYETHYLENE GLYCOL (MIRALAX) 17 GRAM PACKET] Take 17 g by mouth daily as needed.  senna (SENOKOT) 8.6 mg tablet, [SENNA (SENOKOT) 8.6 MG TABLET] Take 1 tablet by mouth daily.  sertraline (ZOLOFT) 100 MG tablet, [SERTRALINE (ZOLOFT) 100 MG TABLET] Take 300 mg by mouth daily.  simethicone (MYLICON) 80 MG chewable tablet,  "[SIMETHICONE (MYLICON) 80 MG CHEWABLE TABLET] Chew 80 mg every 6 (six) hours as needed for flatulence.  tolterodine (DETROL LA) 4 MG ER capsule, [TOLTERODINE (DETROL LA) 4 MG ER CAPSULE] Take 4 mg by mouth daily.            Review of Systems     A 12 point comprehensive review of systems was negative except as noted above in HPI.    PHYSICAL EXAMINATION       Vitals      Vitals: /61   Pulse 102   Temp 98.9  F (37.2  C) (Oral)   Resp 24   Ht 1.905 m (6' 3\")   Wt (!) 228 kg (502 lb 11.2 oz)   SpO2 94%   BMI 62.83 kg/m    BMI= Body mass index is 62.83 kg/m .      Examination     General Appearance:  Alert, cooperative, no distress  Head:    Normocephalic, without obvious abnormality, atraumatic  EENT:  PERRL, conjunctiva/corneas clear, EOM's intact.   Neck:   Supple, symmetrical, trachea midline, no adenopathy; no NVE  Back:  Symmetric, no curvature, no CVA tenderness  Chest/Lungs: decreased air entry, no rales (+) rhonchi. respirations unlabored, No tenderness or deformity. No abdominal breathing or use of accessory muscles.   Heart:    Regular rate and rhythm, S1 and S2 normal, no murmur, rub   or gallop  Abdomen: Soft, non-tender, bowel sounds active all four quadrants, not peritoneal on palpation. Not distended  Extremities:  Extremities normal, atraumatic, no swelling   Skin:  Skin color, texture, turgor normal, no rashes or lesion  Neurologic:  Awake and alert, No lateralizing or localizing signs       Pertinent Lab              Pertinent Radiology         "

## 2021-11-14 NOTE — ED TRIAGE NOTES
Headaches, sore throat, cough, body aches for a couple days, nausea and vomiting today and SOB today.  Urgency room sent over for sepsis and pneumonia, pt sats 94% on 4 L oxygen by N/C, reported to be COVID and flu negative, running azithromycin in IV

## 2021-11-15 LAB
ANION GAP SERPL CALCULATED.3IONS-SCNC: 7 MMOL/L (ref 5–18)
BUN SERPL-MCNC: 10 MG/DL (ref 8–22)
CALCIUM SERPL-MCNC: 8.8 MG/DL (ref 8.5–10.5)
CHLORIDE BLD-SCNC: 110 MMOL/L (ref 98–107)
CO2 SERPL-SCNC: 26 MMOL/L (ref 22–31)
CREAT SERPL-MCNC: 0.71 MG/DL (ref 0.7–1.3)
ERYTHROCYTE [DISTWIDTH] IN BLOOD BY AUTOMATED COUNT: 19.9 % (ref 10–15)
GFR SERPL CREATININE-BSD FRML MDRD: >90 ML/MIN/1.73M2
GLUCOSE BLD-MCNC: 152 MG/DL (ref 70–125)
GLUCOSE BLDC GLUCOMTR-MCNC: 106 MG/DL (ref 70–99)
GLUCOSE BLDC GLUCOMTR-MCNC: 117 MG/DL (ref 70–99)
GLUCOSE BLDC GLUCOMTR-MCNC: 131 MG/DL (ref 70–99)
GLUCOSE BLDC GLUCOMTR-MCNC: 132 MG/DL (ref 70–99)
GLUCOSE BLDC GLUCOMTR-MCNC: 196 MG/DL (ref 70–99)
GLUCOSE BLDC GLUCOMTR-MCNC: 95 MG/DL (ref 70–99)
HCT VFR BLD AUTO: 40.2 % (ref 40–53)
HGB BLD-MCNC: 11.5 G/DL (ref 13.3–17.7)
MAGNESIUM SERPL-MCNC: 2.1 MG/DL (ref 1.8–2.6)
MCH RBC QN AUTO: 22.2 PG (ref 26.5–33)
MCHC RBC AUTO-ENTMCNC: 28.6 G/DL (ref 31.5–36.5)
MCV RBC AUTO: 78 FL (ref 78–100)
PLATELET # BLD AUTO: 234 10E3/UL (ref 150–450)
POTASSIUM BLD-SCNC: 4 MMOL/L (ref 3.5–5)
RBC # BLD AUTO: 5.18 10E6/UL (ref 4.4–5.9)
SODIUM SERPL-SCNC: 143 MMOL/L (ref 136–145)
WBC # BLD AUTO: 8.6 10E3/UL (ref 4–11)

## 2021-11-15 PROCEDURE — 83735 ASSAY OF MAGNESIUM: CPT | Performed by: INTERNAL MEDICINE

## 2021-11-15 PROCEDURE — 80048 BASIC METABOLIC PNL TOTAL CA: CPT | Performed by: INTERNAL MEDICINE

## 2021-11-15 PROCEDURE — 250N000009 HC RX 250: Performed by: INTERNAL MEDICINE

## 2021-11-15 PROCEDURE — 99232 SBSQ HOSP IP/OBS MODERATE 35: CPT | Performed by: INTERNAL MEDICINE

## 2021-11-15 PROCEDURE — 94640 AIRWAY INHALATION TREATMENT: CPT

## 2021-11-15 PROCEDURE — 250N000011 HC RX IP 250 OP 636: Performed by: INTERNAL MEDICINE

## 2021-11-15 PROCEDURE — 250N000013 HC RX MED GY IP 250 OP 250 PS 637: Performed by: INTERNAL MEDICINE

## 2021-11-15 PROCEDURE — 210N000002 HC R&B HEART CARE

## 2021-11-15 PROCEDURE — 258N000003 HC RX IP 258 OP 636: Performed by: INTERNAL MEDICINE

## 2021-11-15 PROCEDURE — 85027 COMPLETE CBC AUTOMATED: CPT | Performed by: INTERNAL MEDICINE

## 2021-11-15 PROCEDURE — 99207 PR CDG-MDM COMPONENT: MEETS MODERATE - DOWN CODED: CPT | Performed by: INTERNAL MEDICINE

## 2021-11-15 PROCEDURE — 36415 COLL VENOUS BLD VENIPUNCTURE: CPT | Performed by: INTERNAL MEDICINE

## 2021-11-15 PROCEDURE — 999N000157 HC STATISTIC RCP TIME EA 10 MIN

## 2021-11-15 RX ORDER — METHYLPREDNISOLONE SODIUM SUCCINATE 40 MG/ML
20 INJECTION, POWDER, LYOPHILIZED, FOR SOLUTION INTRAMUSCULAR; INTRAVENOUS EVERY 12 HOURS
Status: DISCONTINUED | OUTPATIENT
Start: 2021-11-15 | End: 2021-11-17

## 2021-11-15 RX ORDER — IPRATROPIUM BROMIDE AND ALBUTEROL SULFATE 2.5; .5 MG/3ML; MG/3ML
3 SOLUTION RESPIRATORY (INHALATION)
Status: DISCONTINUED | OUTPATIENT
Start: 2021-11-15 | End: 2021-11-16

## 2021-11-15 RX ORDER — BENZONATATE 100 MG/1
100 CAPSULE ORAL 3 TIMES DAILY PRN
Status: DISCONTINUED | OUTPATIENT
Start: 2021-11-15 | End: 2021-11-18 | Stop reason: HOSPADM

## 2021-11-15 RX ADMIN — SUCRALFATE 1 G: 1 TABLET ORAL at 12:59

## 2021-11-15 RX ADMIN — TIZANIDINE 2 MG: 2 TABLET ORAL at 21:04

## 2021-11-15 RX ADMIN — BENZONATATE 100 MG: 100 CAPSULE ORAL at 10:10

## 2021-11-15 RX ADMIN — ACETAMINOPHEN 650 MG: 325 TABLET ORAL at 16:00

## 2021-11-15 RX ADMIN — ACETAMINOPHEN 650 MG: 325 TABLET ORAL at 23:34

## 2021-11-15 RX ADMIN — PANTOPRAZOLE SODIUM 40 MG: 20 TABLET, DELAYED RELEASE ORAL at 10:10

## 2021-11-15 RX ADMIN — ATORVASTATIN CALCIUM 20 MG: 10 TABLET, FILM COATED ORAL at 21:12

## 2021-11-15 RX ADMIN — CLOZAPINE 50 MG: 25 TABLET ORAL at 21:03

## 2021-11-15 RX ADMIN — BENZONATATE 100 MG: 100 CAPSULE ORAL at 17:16

## 2021-11-15 RX ADMIN — SUCRALFATE 1 G: 1 TABLET ORAL at 16:00

## 2021-11-15 RX ADMIN — GABAPENTIN 800 MG: 400 CAPSULE ORAL at 21:02

## 2021-11-15 RX ADMIN — SUCRALFATE 1 G: 1 TABLET ORAL at 21:02

## 2021-11-15 RX ADMIN — AMLODIPINE BESYLATE 5 MG: 5 TABLET ORAL at 10:10

## 2021-11-15 RX ADMIN — SERTRALINE 300 MG: 100 TABLET, FILM COATED ORAL at 10:14

## 2021-11-15 RX ADMIN — IPRATROPIUM BROMIDE AND ALBUTEROL SULFATE 3 ML: .5; 2.5 SOLUTION RESPIRATORY (INHALATION) at 19:43

## 2021-11-15 RX ADMIN — DEXTROSE AND SODIUM CHLORIDE: 5; 900 INJECTION, SOLUTION INTRAVENOUS at 10:14

## 2021-11-15 RX ADMIN — LOSARTAN POTASSIUM 100 MG: 50 TABLET, FILM COATED ORAL at 10:09

## 2021-11-15 RX ADMIN — CLOZAPINE 300 MG: 100 TABLET ORAL at 21:10

## 2021-11-15 RX ADMIN — Medication 1 MG: at 01:02

## 2021-11-15 RX ADMIN — PANTOPRAZOLE SODIUM 40 MG: 20 TABLET, DELAYED RELEASE ORAL at 19:56

## 2021-11-15 RX ADMIN — FAMOTIDINE 20 MG: 20 TABLET ORAL at 10:11

## 2021-11-15 RX ADMIN — FAMOTIDINE 20 MG: 20 TABLET ORAL at 19:55

## 2021-11-15 RX ADMIN — GUAIFENESIN AND DEXTROMETHORPHAN 10 ML: 100; 10 SYRUP ORAL at 06:26

## 2021-11-15 RX ADMIN — Medication 1 MG: at 23:34

## 2021-11-15 RX ADMIN — SUCRALFATE 1 G: 1 TABLET ORAL at 10:10

## 2021-11-15 RX ADMIN — GABAPENTIN 800 MG: 400 CAPSULE ORAL at 10:10

## 2021-11-15 RX ADMIN — ENOXAPARIN SODIUM 40 MG: 100 INJECTION SUBCUTANEOUS at 00:51

## 2021-11-15 RX ADMIN — GABAPENTIN 800 MG: 400 CAPSULE ORAL at 17:16

## 2021-11-15 RX ADMIN — LAMOTRIGINE 300 MG: 150 TABLET ORAL at 21:03

## 2021-11-15 RX ADMIN — METHYLPREDNISOLONE SODIUM SUCCINATE 20 MG: 40 INJECTION, POWDER, FOR SOLUTION INTRAMUSCULAR; INTRAVENOUS at 18:40

## 2021-11-15 RX ADMIN — ENOXAPARIN SODIUM 40 MG: 100 INJECTION SUBCUTANEOUS at 12:59

## 2021-11-15 RX ADMIN — CEFTRIAXONE SODIUM 2 G: 2 INJECTION, POWDER, FOR SOLUTION INTRAMUSCULAR; INTRAVENOUS at 21:01

## 2021-11-15 RX ADMIN — METOPROLOL SUCCINATE 200 MG: 50 TABLET, EXTENDED RELEASE ORAL at 10:10

## 2021-11-15 RX ADMIN — AZITHROMYCIN 500 MG: 500 INJECTION, POWDER, LYOPHILIZED, FOR SOLUTION INTRAVENOUS at 00:46

## 2021-11-15 ASSESSMENT — ACTIVITIES OF DAILY LIVING (ADL)
ADLS_ACUITY_SCORE: 9

## 2021-11-15 ASSESSMENT — MIFFLIN-ST. JEOR: SCORE: 3223.76

## 2021-11-15 NOTE — PLAN OF CARE
Pt arrived from ER where he'd been boarded awaiting a room. He continues to have a harsh cough which alternates between dry & congested. Pt was Sating in the low 90 to hi 80 range on room air. O2 via nasal cannula was applied & his sats improved to steady in the med 90's. Pt states he feels anxious, yet his behavior is mostly calm and cooperative. Pt is on a bed alarm even though he's physically capable of ambulating. He has continuous IV fluids running & seems to get disoriented easily. He has congenital cognitive disabilities and his demeanor is that of a 12 year old (approximately). His primary complaint(aside from anxiety) has been a headache. It has responded marginally to Tylenol. Report given to oncoming RN

## 2021-11-15 NOTE — PLAN OF CARE
"  Problem: Adult Inpatient Plan of Care  Goal: Plan of Care Review  11/15/2021 1528 by Deloris Marie, RN  Outcome: Improving     Problem: Infection (Pneumonia)  Goal: Resolution of Infection Signs and Symptoms  Outcome: Improving     Pt transferred to room 308 this morning.    /76 (BP Location: Left arm)   Pulse 84   Temp 98.1  F (36.7  C) (Oral)   Resp 20   Ht 1.905 m (6' 3\")   Wt (!) 219.8 kg (484 lb 9.6 oz)   SpO2 93%   BMI 60.57 kg/m      VSS.   Maintaining O2 sats >90% on 4L NC. Frequent productive cough.   Exp wheezes in bases.  R FA IV infusing D5.9 @ 100cc/hr.  1600 weaned pt to 3L NC for 93% O2 sats while sitting at bedside.  PRN tylenol administered for mild headache.  Plan of care discussed with patient.   Call light within reach. Nursing will continue to monitor.       "

## 2021-11-15 NOTE — PLAN OF CARE
Problem: Respiratory Compromise (Pneumonia)  Goal: Effective Oxygenation and Ventilation  Intervention: Optimize Oxygenation and Ventilation  Recent Flowsheet Documentation  Taken 11/14/2021 2331 by Saida Ruano RN  Head of Bed (HOB) Positioning: HOB at 30-45 degrees     Problem: Hyperglycemia  Goal: Blood Glucose Level Within Targeted Range  Outcome: Improving     Glucose 95 and 132 overnight. D5NS infusing. Requires 2 liters oxygen overnight. Bed alarm on for safety. Patient reminded to call for assistance prior to standing. Vitals otherwise stable.

## 2021-11-16 ENCOUNTER — APPOINTMENT (OUTPATIENT)
Dept: RADIOLOGY | Facility: CLINIC | Age: 36
End: 2021-11-16
Attending: FAMILY MEDICINE
Payer: MEDICAID

## 2021-11-16 LAB
BACTERIA SPT CULT: NORMAL
C REACTIVE PROTEIN LHE: 3 MG/DL (ref 0–0.8)
CREAT SERPL-MCNC: 0.67 MG/DL (ref 0.7–1.3)
GFR SERPL CREATININE-BSD FRML MDRD: >90 ML/MIN/1.73M2
GLUCOSE BLDC GLUCOMTR-MCNC: 150 MG/DL (ref 70–99)
GLUCOSE BLDC GLUCOMTR-MCNC: 194 MG/DL (ref 70–99)
GLUCOSE BLDC GLUCOMTR-MCNC: 213 MG/DL (ref 70–99)
GLUCOSE BLDC GLUCOMTR-MCNC: 97 MG/DL (ref 70–99)
GRAM STAIN RESULT: NORMAL
HGB BLD-MCNC: 11.8 G/DL (ref 13.3–17.7)
MAGNESIUM SERPL-MCNC: 2.3 MG/DL (ref 1.8–2.6)
PHOSPHATE SERPL-MCNC: 2.6 MG/DL (ref 2.5–4.5)
POTASSIUM BLD-SCNC: 4.5 MMOL/L (ref 3.5–5)

## 2021-11-16 PROCEDURE — 94640 AIRWAY INHALATION TREATMENT: CPT | Mod: 76

## 2021-11-16 PROCEDURE — 36415 COLL VENOUS BLD VENIPUNCTURE: CPT | Performed by: INTERNAL MEDICINE

## 2021-11-16 PROCEDURE — 99232 SBSQ HOSP IP/OBS MODERATE 35: CPT | Performed by: FAMILY MEDICINE

## 2021-11-16 PROCEDURE — 258N000003 HC RX IP 258 OP 636: Performed by: INTERNAL MEDICINE

## 2021-11-16 PROCEDURE — 83735 ASSAY OF MAGNESIUM: CPT | Performed by: INTERNAL MEDICINE

## 2021-11-16 PROCEDURE — 250N000013 HC RX MED GY IP 250 OP 250 PS 637: Performed by: INTERNAL MEDICINE

## 2021-11-16 PROCEDURE — 85018 HEMOGLOBIN: CPT | Performed by: INTERNAL MEDICINE

## 2021-11-16 PROCEDURE — 94640 AIRWAY INHALATION TREATMENT: CPT

## 2021-11-16 PROCEDURE — 250N000011 HC RX IP 250 OP 636: Performed by: INTERNAL MEDICINE

## 2021-11-16 PROCEDURE — 250N000009 HC RX 250: Performed by: INTERNAL MEDICINE

## 2021-11-16 PROCEDURE — 82565 ASSAY OF CREATININE: CPT | Performed by: INTERNAL MEDICINE

## 2021-11-16 PROCEDURE — 84100 ASSAY OF PHOSPHORUS: CPT | Performed by: INTERNAL MEDICINE

## 2021-11-16 PROCEDURE — 210N000002 HC R&B HEART CARE

## 2021-11-16 PROCEDURE — 86141 C-REACTIVE PROTEIN HS: CPT | Performed by: INTERNAL MEDICINE

## 2021-11-16 PROCEDURE — 84132 ASSAY OF SERUM POTASSIUM: CPT | Performed by: INTERNAL MEDICINE

## 2021-11-16 PROCEDURE — 71046 X-RAY EXAM CHEST 2 VIEWS: CPT

## 2021-11-16 RX ORDER — IPRATROPIUM BROMIDE AND ALBUTEROL SULFATE 2.5; .5 MG/3ML; MG/3ML
3 SOLUTION RESPIRATORY (INHALATION) EVERY 4 HOURS PRN
Status: DISCONTINUED | OUTPATIENT
Start: 2021-11-16 | End: 2021-11-18 | Stop reason: HOSPADM

## 2021-11-16 RX ADMIN — ACETAMINOPHEN 650 MG: 325 TABLET ORAL at 19:33

## 2021-11-16 RX ADMIN — ENOXAPARIN SODIUM 40 MG: 100 INJECTION SUBCUTANEOUS at 01:01

## 2021-11-16 RX ADMIN — CLOZAPINE 300 MG: 100 TABLET ORAL at 21:43

## 2021-11-16 RX ADMIN — IPRATROPIUM BROMIDE AND ALBUTEROL SULFATE 3 ML: .5; 2.5 SOLUTION RESPIRATORY (INHALATION) at 07:58

## 2021-11-16 RX ADMIN — SUCRALFATE 1 G: 1 TABLET ORAL at 19:30

## 2021-11-16 RX ADMIN — LOSARTAN POTASSIUM 100 MG: 50 TABLET, FILM COATED ORAL at 09:16

## 2021-11-16 RX ADMIN — ENOXAPARIN SODIUM 40 MG: 100 INJECTION SUBCUTANEOUS at 13:24

## 2021-11-16 RX ADMIN — SERTRALINE 300 MG: 100 TABLET, FILM COATED ORAL at 09:15

## 2021-11-16 RX ADMIN — METOPROLOL SUCCINATE 200 MG: 50 TABLET, EXTENDED RELEASE ORAL at 09:16

## 2021-11-16 RX ADMIN — ATORVASTATIN CALCIUM 20 MG: 10 TABLET, FILM COATED ORAL at 21:43

## 2021-11-16 RX ADMIN — GABAPENTIN 800 MG: 400 CAPSULE ORAL at 19:30

## 2021-11-16 RX ADMIN — METHYLPREDNISOLONE SODIUM SUCCINATE 20 MG: 40 INJECTION, POWDER, FOR SOLUTION INTRAMUSCULAR; INTRAVENOUS at 19:30

## 2021-11-16 RX ADMIN — SUCRALFATE 1 G: 1 TABLET ORAL at 15:38

## 2021-11-16 RX ADMIN — SUCRALFATE 1 G: 1 TABLET ORAL at 13:24

## 2021-11-16 RX ADMIN — METHYLPREDNISOLONE SODIUM SUCCINATE 20 MG: 40 INJECTION, POWDER, FOR SOLUTION INTRAMUSCULAR; INTRAVENOUS at 05:55

## 2021-11-16 RX ADMIN — IPRATROPIUM BROMIDE AND ALBUTEROL SULFATE 3 ML: .5; 2.5 SOLUTION RESPIRATORY (INHALATION) at 11:55

## 2021-11-16 RX ADMIN — LAMOTRIGINE 300 MG: 150 TABLET ORAL at 21:43

## 2021-11-16 RX ADMIN — GUAIFENESIN AND DEXTROMETHORPHAN 10 ML: 100; 10 SYRUP ORAL at 21:44

## 2021-11-16 RX ADMIN — GABAPENTIN 800 MG: 400 CAPSULE ORAL at 09:16

## 2021-11-16 RX ADMIN — TIZANIDINE 2 MG: 2 TABLET ORAL at 21:43

## 2021-11-16 RX ADMIN — GUAIFENESIN AND DEXTROMETHORPHAN 10 ML: 100; 10 SYRUP ORAL at 15:38

## 2021-11-16 RX ADMIN — PANTOPRAZOLE SODIUM 40 MG: 20 TABLET, DELAYED RELEASE ORAL at 09:16

## 2021-11-16 RX ADMIN — CEFTRIAXONE SODIUM 2 G: 2 INJECTION, POWDER, FOR SOLUTION INTRAMUSCULAR; INTRAVENOUS at 21:44

## 2021-11-16 RX ADMIN — AZITHROMYCIN 500 MG: 500 INJECTION, POWDER, LYOPHILIZED, FOR SOLUTION INTRAVENOUS at 01:01

## 2021-11-16 RX ADMIN — FAMOTIDINE 20 MG: 20 TABLET ORAL at 19:31

## 2021-11-16 RX ADMIN — AMLODIPINE BESYLATE 5 MG: 5 TABLET ORAL at 09:16

## 2021-11-16 RX ADMIN — SUCRALFATE 1 G: 1 TABLET ORAL at 09:15

## 2021-11-16 RX ADMIN — FAMOTIDINE 20 MG: 20 TABLET ORAL at 09:16

## 2021-11-16 RX ADMIN — PANTOPRAZOLE SODIUM 40 MG: 20 TABLET, DELAYED RELEASE ORAL at 19:30

## 2021-11-16 RX ADMIN — CLOZAPINE 50 MG: 25 TABLET ORAL at 21:44

## 2021-11-16 ASSESSMENT — ACTIVITIES OF DAILY LIVING (ADL)
ADLS_ACUITY_SCORE: 8
ADLS_ACUITY_SCORE: 9
ADLS_ACUITY_SCORE: 6
ADLS_ACUITY_SCORE: 9
ADLS_ACUITY_SCORE: 6
ADLS_ACUITY_SCORE: 8
ADLS_ACUITY_SCORE: 6
ADLS_ACUITY_SCORE: 8
ADLS_ACUITY_SCORE: 6
ADLS_ACUITY_SCORE: 8
ADLS_ACUITY_SCORE: 8
ADLS_ACUITY_SCORE: 6
ADLS_ACUITY_SCORE: 8
ADLS_ACUITY_SCORE: 6
ADLS_ACUITY_SCORE: 8
ADLS_ACUITY_SCORE: 8
TOILETING_ISSUES: NO
ADLS_ACUITY_SCORE: 6

## 2021-11-16 NOTE — PROGRESS NOTES
"[facility] MEDICINE PROGRESS NOTE      Code Status: Full Code       Identification/Summary:   Sunil Rondon is a 36 year old male with a past medical history of developmental delay, anxiety, depression, GERD, DM 2, obstructive sleep apnea noncompliant with CPAP, morbid obesity BMI 62 presented with shortness of breath, cough who was admitted on 11/13/2021 for left-sided community-acquired pneumonia noted on CT, leukocytosis.  He was found to be hypoglycemic with a blood sugars of 54.  Started on D5 normal saline.  Last A1c 6.6.             Assessment and Plan:     CAP  --Continue Rocephin  --Continue Zithromax  --CXR today  --Patient on IV Solu-Medrol and will continue for now  --Oxygen support as needed  --RT to continue to encourage I-S and up in chair    Elevated blood sugars  --Stable  --Improved  --Restart Metformin and slowly at home meds and as patient improves    Morbid obesity  --Patient management    Bipolar disorder  --Continue Klonopin, Lamictal, Neurontin and Zoloft      Disposition: Multiple day stay      Erika Guerrier MD, MD  [facility] Medicine  [facility]  Phone: #[phone]         Subjective:     No interval change.  Patient continues to require 3 L of oxygen.      Physical Exam/Objective:  /81 (BP Location: Left arm)   Pulse 73   Temp 97.7  F (36.5  C) (Oral)   Resp 18   Ht 1.905 m (6' 3\")   Wt (!) 219.8 kg (484 lb 9.6 oz)   SpO2 93%   BMI 60.57 kg/m      Constitutional: awake, alert, cooperative, no apparent distress,  Respiratory: Lungs clear, no adventitious sounds  Cardiovascular: RRR, Nl S1, S2  Skin: Pink and dry  Neurologic: Moves all four extremities, pupils equal  Neuropsychiatric:  Alert, resting quietly    Medications:   Amlodipine, Lipitor, Zithromax, Rocephin, Klonopin, Lovenox, Pepcid, Neurontin, DuoNeb's, Lamictal, losartan, Solu-Medrol, metoprolol, Protonix, Zoloft, Carafate, Zanaflex    Labs: " Potassium 4.5, creatinine 0.67, CRP 3.0, glucose 97, hemoglobin 11.8    Imaging: Chest x-ray pending      Current Diet  Orders Placed This Encounter      Combination Diet Regular Diet Adult    Supplements  None

## 2021-11-16 NOTE — PLAN OF CARE
Problem: Infection (Pneumonia)  Goal: Resolution of Infection Signs and Symptoms  Outcome: Improving     Problem: Respiratory Compromise (Pneumonia)  Goal: Effective Oxygenation and Ventilation  Intervention: Optimize Oxygenation and Ventilation    Resting in bed through the night. Poor posture when sleeping, curled up and snoring. Did have to increase O2 up to 4L via NC while sleeping, otherwise maintained sats in low 90's. VSS, afebrile. No significant coughing overnight.

## 2021-11-16 NOTE — PROGRESS NOTES
[facility] Medicine PROGRESS NOTE      Identification/Summary: Sunil Rondon is a 36 year old male with a past medical history of developmental delay, anxiety, depression, GERD, DM 2, obstructive sleep apnea noncompliant with CPAP, morbid obesity BMI 62 presented with shortness of breath, cough who was admitted on 11/13/2021 for left-sided community-acquired pneumonia noted on CT, leukocytosis.  He was found to be hypoglycemic with a blood sugars of 54.  Started on D5 normal saline.  Last A1c 6.6.        11/15 :       Still on 4 liters of oxygen  Sounds wheezy on auscultation  Will start nebs and low dose iv steroids and try to wean off oxygen    Blood sugars stable on sliding scale insulin  Will stop iv dextrose and will monitor blood sugars  Will slowly introduce his home diabetic meds from tomorrow morning if blood sugars stable and start with metformin      Possible discharge wed/thursday once respiratory status stable and blood sugars stable on home diabetic regimen        Assessment and Plan:  Community-acquired pneumonia  Acute respiratory failure  Afebrile. Leukocytosis 15.1  Continue ceftriaxone, azithromycin  Check sputum cultures  Follow-up on respiratory panel.  On 4 L of oxygen  Wean off as tolerated.  Rapid flu, Covid negative.    Diabetes mellitus type 2  Hypoglycemia  Likely from poor oral intake.  Last A1c 8/9/2021 6.6  Hold off on oral hypoglycemics, Lantus  If the blood sugars are trending upwards above 150 we will stop D5  Insulin sliding scale as needed until oral intake improves    History of anxiety depression  Bipolar versus schizoaffective disorder  Developmental delay  On multiple psych meds continued    Morbid obesity BMI 62  Obstructive sleep apnea  Noncompliant with CPAP  Oxygen as needed    Hypertension  Continue home medications with holding parameters  On amlodipine losartan, metoprolol    GERD  Continue Pepcid, Carafate, Protonix    History of low back pain  on tizanidine,  "gabapentin    Diet: Combination Diet Regular Diet Adult  DVT Prophylaxis: On Lovenox   Full Code    Anticipated possible discharge in 1-2 days once clinical improvement milestones are met.    Interval History/Subjective:  He has a cough, headache, shortness of breath. he has a flat affect.     Physical Exam/Objective:  Vitals I/O   Vital signs:  Temp: 98.1  F (36.7  C) Temp src: Oral BP: 134/76 Pulse: 84   Resp: 20 SpO2: 93 % O2 Device: Nasal cannula Oxygen Delivery: 3 LPM Height: 190.5 cm (6' 3\") Weight: (!) 219.8 kg (484 lb 9.6 oz)  Estimated body mass index is 60.57 kg/m  as calculated from the following:    Height as of this encounter: 1.905 m (6' 3\").    Weight as of this encounter: 219.8 kg (484 lb 9.6 oz).     I/O last 3 completed shifts:  In: 3100 [P.O.:2000; I.V.:1100]  Out: 3745 [Urine:3745]     Body mass index is 60.57 kg/m .    General Appearance:  Alert, cooperative, morbidly obese no distress   Head:  Normocephalic, atraumatic   Eyes:  PERRL    Throat:  mucosa; moist   Neck: No JVD, thyromegaly   Lungs:   Clear to auscultation bilaterally, no obvious rhonchi or wheezing respirations unlabored   Chest Wall:  No tenderness or deformity   Heart:  Regular rate and rhythm, S1, S2 normal,no murmur   Abdomen:   Soft, non tender obese, non distended, bowel sounds present, no guarding or rigidity   Extremities: No edema, no joint swelling   Skin: Skin color, texture, turgor normal, no rashes or lesions   Neurologic: Alert and oriented X 3, Moves all 4 extremities       Medications:   Personally Reviewed.    amLODIPine  5 mg Oral Daily     atorvastatin  20 mg Oral At Bedtime     azithromycin  500 mg Intravenous Q24H     cefTRIAXone  2 g Intravenous Q24H     cloZAPine  300 mg Oral At Bedtime     cloZAPine  50 mg Oral At Bedtime     enoxaparin ANTICOAGULANT  40 mg Subcutaneous Q12H     famotidine  20 mg Oral BID     gabapentin  800 mg Oral BID     [Held by provider] insulin aspart  1-7 Units Subcutaneous TID AC "     [Held by provider] insulin aspart  1-5 Units Subcutaneous At Bedtime     lamoTRIgine  300 mg Oral At Bedtime     losartan  100 mg Oral Daily     metoprolol succinate ER  200 mg Oral Daily     pantoprazole  40 mg Oral BID     sertraline  300 mg Oral Daily     sodium chloride (PF)  3 mL Intracatheter Q8H     sucralfate  1 g Oral 4x Daily     tiZANidine  2 mg Oral At Bedtime       Data reviewed today: I personally reviewed all new medications, labs, imaging/diagnostics reports over the past 24 hours. Pertinent findings include    Labs:  Most Recent 3 CBC's:  Recent Labs   Lab Test 11/15/21  0644 11/14/21 0056 03/25/20  1606   WBC 8.6 15.1* 14.1*   HGB 11.5* 12.1* 13.3*   MCV 78 76* 84    259 258     Most Recent 3 BMP's:  Recent Labs   Lab Test 11/15/21  1611 11/15/21  1122 11/15/21  0643 11/14/21  1629 11/14/21  1510 11/14/21  0138 11/14/21  0056 11/14/21 0051 03/25/20  1606   NA  --   --  143  --   --   --  139  --  139   POTASSIUM  --   --  4.0  --  3.8  --  3.8  --  4.1   CHLORIDE  --   --  110*  --   --   --  104  --  102   CO2  --   --  26  --   --   --  24  --  27   BUN  --   --  10  --   --   --  12  --  15   CR  --   --  0.71  --   --   --  0.81  --  0.72   ANIONGAP  --   --  7  --   --   --  11  --  10   SANAZ  --   --  8.8  --   --   --  8.9  --  9.6   * 117* 152*   < >  --    < > 54*   < > 74    < > = values in this interval not displayed.     Most Recent 2 LFT's:  Recent Labs   Lab Test 11/14/21  0056 03/01/20  2305   AST 17 21   ALT 26 33   ALKPHOS 86 118   BILITOTAL 0.2 0.3     Most Recent 3 INR's:No lab results found.  Most Recent 3 Troponin's:No lab results found.  Most Recent Hemoglobin A1c:  Recent Labs   Lab Test 11/14/21  0056   A1C 5.9*     Most Recent 6 glucoses:  Recent Labs   Lab Test 11/15/21  1611 11/15/21  1122 11/15/21  0643 11/15/21  0615 11/15/21  0440 11/15/21  0109   * 117* 152* 132* 131* 95       Imaging:   No results found for this or any previous visit (from  the past 24 hour(s)).    EKG: Personally reviewed.  Sinus tachycardia 100/min no other acute ST-T wave changes.        PROBLEM LIST :         Acute hypoxic respiratory failure : on 3 liters  Sleep apnea : non compliant with c pap  CAP  hypoglycemia  DM2 : on metformin 500 mg bid, glipizide 10 mg daily, liraglutide subcutaneous daily 1.8 mg, canagliflozin, lantus 30 units at bedtime  History of anxiety depression  Bipolar versus schizoaffective disorder  Developmental delay  HTN, essential

## 2021-11-16 NOTE — PLAN OF CARE
Problem: Infection (Pneumonia)  Goal: Resolution of Infection Signs and Symptoms  11/16/2021 0533 by Jaylin Patel RN  Outcome: Improving    Problem: Obstructive Sleep Apnea Risk or Actual (Comorbidity Management)  Goal: Unobstructed Breathing During Sleep  Outcome: No Change     Pt resting in bed through the night. Remains on HHF 40/50 with sats in the upper 90's. Pt refuses to prone, encouraged to alternate side-lying positions but was mainly supine through the night. VSS, afebrile.

## 2021-11-16 NOTE — PLAN OF CARE
Patient remains on 3 L nasal cannula, 02 90-93%. Lungs sound diminished/clear. Productive cough present. Patient reports 'sometimes' feeling short of breath with activity. Working with PT/OT. Will continue to monitor.

## 2021-11-16 NOTE — PROVIDER NOTIFICATION
Pt was given duoneb x1, BS coarse/diminished to expiratory wheezes post nebs, currently on 2L NC. Per [facility] protocol, will continue pt on duonebs QID per MD order as well. RT following.

## 2021-11-17 LAB
CREAT SERPL-MCNC: 0.69 MG/DL (ref 0.7–1.3)
GFR SERPL CREATININE-BSD FRML MDRD: >90 ML/MIN/1.73M2
GLUCOSE BLDC GLUCOMTR-MCNC: 126 MG/DL (ref 70–99)
GLUCOSE BLDC GLUCOMTR-MCNC: 130 MG/DL (ref 70–99)
GLUCOSE BLDC GLUCOMTR-MCNC: 152 MG/DL (ref 70–99)
GLUCOSE BLDC GLUCOMTR-MCNC: 173 MG/DL (ref 70–99)
MAGNESIUM SERPL-MCNC: 1.9 MG/DL (ref 1.8–2.6)
PLATELET # BLD AUTO: 284 10E3/UL (ref 150–450)
POTASSIUM BLD-SCNC: 4.1 MMOL/L (ref 3.5–5)

## 2021-11-17 PROCEDURE — 99232 SBSQ HOSP IP/OBS MODERATE 35: CPT | Performed by: FAMILY MEDICINE

## 2021-11-17 PROCEDURE — 82565 ASSAY OF CREATININE: CPT | Performed by: INTERNAL MEDICINE

## 2021-11-17 PROCEDURE — 84132 ASSAY OF SERUM POTASSIUM: CPT | Performed by: INTERNAL MEDICINE

## 2021-11-17 PROCEDURE — 250N000013 HC RX MED GY IP 250 OP 250 PS 637: Performed by: FAMILY MEDICINE

## 2021-11-17 PROCEDURE — 258N000003 HC RX IP 258 OP 636: Performed by: INTERNAL MEDICINE

## 2021-11-17 PROCEDURE — 999N000157 HC STATISTIC RCP TIME EA 10 MIN

## 2021-11-17 PROCEDURE — 120N000001 HC R&B MED SURG/OB

## 2021-11-17 PROCEDURE — 36415 COLL VENOUS BLD VENIPUNCTURE: CPT | Performed by: INTERNAL MEDICINE

## 2021-11-17 PROCEDURE — 250N000013 HC RX MED GY IP 250 OP 250 PS 637: Performed by: INTERNAL MEDICINE

## 2021-11-17 PROCEDURE — 94762 N-INVAS EAR/PLS OXIMTRY CONT: CPT

## 2021-11-17 PROCEDURE — 250N000011 HC RX IP 250 OP 636: Performed by: INTERNAL MEDICINE

## 2021-11-17 PROCEDURE — 83735 ASSAY OF MAGNESIUM: CPT | Performed by: INTERNAL MEDICINE

## 2021-11-17 PROCEDURE — 85049 AUTOMATED PLATELET COUNT: CPT | Performed by: INTERNAL MEDICINE

## 2021-11-17 RX ORDER — GLIPIZIDE 10 MG/1
10 TABLET, FILM COATED, EXTENDED RELEASE ORAL DAILY
Status: DISCONTINUED | OUTPATIENT
Start: 2021-11-17 | End: 2021-11-18 | Stop reason: HOSPADM

## 2021-11-17 RX ORDER — LORAZEPAM 1 MG/1
1 TABLET ORAL ONCE
Status: COMPLETED | OUTPATIENT
Start: 2021-11-17 | End: 2021-11-17

## 2021-11-17 RX ADMIN — LAMOTRIGINE 300 MG: 150 TABLET ORAL at 22:08

## 2021-11-17 RX ADMIN — SERTRALINE 300 MG: 100 TABLET, FILM COATED ORAL at 08:30

## 2021-11-17 RX ADMIN — AZITHROMYCIN 500 MG: 500 INJECTION, POWDER, LYOPHILIZED, FOR SOLUTION INTRAVENOUS at 01:37

## 2021-11-17 RX ADMIN — GLIPIZIDE 10 MG: 10 TABLET, EXTENDED RELEASE ORAL at 14:15

## 2021-11-17 RX ADMIN — LOSARTAN POTASSIUM 100 MG: 50 TABLET, FILM COATED ORAL at 08:30

## 2021-11-17 RX ADMIN — GUAIFENESIN AND DEXTROMETHORPHAN 10 ML: 100; 10 SYRUP ORAL at 06:39

## 2021-11-17 RX ADMIN — BENZONATATE 100 MG: 100 CAPSULE ORAL at 16:39

## 2021-11-17 RX ADMIN — METFORMIN HYDROCHLORIDE 1000 MG: 500 TABLET, FILM COATED ORAL at 17:41

## 2021-11-17 RX ADMIN — PANTOPRAZOLE SODIUM 40 MG: 20 TABLET, DELAYED RELEASE ORAL at 08:31

## 2021-11-17 RX ADMIN — ENOXAPARIN SODIUM 40 MG: 100 INJECTION SUBCUTANEOUS at 16:38

## 2021-11-17 RX ADMIN — GABAPENTIN 800 MG: 400 CAPSULE ORAL at 08:30

## 2021-11-17 RX ADMIN — SUCRALFATE 1 G: 1 TABLET ORAL at 08:31

## 2021-11-17 RX ADMIN — SUCRALFATE 1 G: 1 TABLET ORAL at 19:57

## 2021-11-17 RX ADMIN — ATORVASTATIN CALCIUM 20 MG: 10 TABLET, FILM COATED ORAL at 22:07

## 2021-11-17 RX ADMIN — LORAZEPAM 1 MG: 1 TABLET ORAL at 17:40

## 2021-11-17 RX ADMIN — ACETAMINOPHEN 650 MG: 325 TABLET ORAL at 22:07

## 2021-11-17 RX ADMIN — PANTOPRAZOLE SODIUM 40 MG: 20 TABLET, DELAYED RELEASE ORAL at 19:57

## 2021-11-17 RX ADMIN — METOPROLOL SUCCINATE 200 MG: 50 TABLET, EXTENDED RELEASE ORAL at 08:31

## 2021-11-17 RX ADMIN — TIZANIDINE 2 MG: 2 TABLET ORAL at 22:07

## 2021-11-17 RX ADMIN — ACETAMINOPHEN 650 MG: 325 TABLET ORAL at 01:34

## 2021-11-17 RX ADMIN — CLOZAPINE 50 MG: 25 TABLET ORAL at 22:09

## 2021-11-17 RX ADMIN — AMOXICILLIN AND CLAVULANATE POTASSIUM 1 TABLET: 875; 125 TABLET, FILM COATED ORAL at 14:15

## 2021-11-17 RX ADMIN — GABAPENTIN 800 MG: 400 CAPSULE ORAL at 19:57

## 2021-11-17 RX ADMIN — SUCRALFATE 1 G: 1 TABLET ORAL at 16:39

## 2021-11-17 RX ADMIN — FAMOTIDINE 20 MG: 20 TABLET ORAL at 19:57

## 2021-11-17 RX ADMIN — ENOXAPARIN SODIUM 40 MG: 100 INJECTION SUBCUTANEOUS at 01:33

## 2021-11-17 RX ADMIN — METHYLPREDNISOLONE SODIUM SUCCINATE 20 MG: 40 INJECTION, POWDER, FOR SOLUTION INTRAMUSCULAR; INTRAVENOUS at 06:30

## 2021-11-17 RX ADMIN — AMLODIPINE BESYLATE 5 MG: 5 TABLET ORAL at 08:31

## 2021-11-17 RX ADMIN — FAMOTIDINE 20 MG: 20 TABLET ORAL at 08:29

## 2021-11-17 RX ADMIN — AMOXICILLIN AND CLAVULANATE POTASSIUM 1 TABLET: 875; 125 TABLET, FILM COATED ORAL at 19:57

## 2021-11-17 RX ADMIN — CLOZAPINE 300 MG: 100 TABLET ORAL at 22:09

## 2021-11-17 ASSESSMENT — ACTIVITIES OF DAILY LIVING (ADL)
ADLS_ACUITY_SCORE: 8
ADLS_ACUITY_SCORE: 10
ADLS_ACUITY_SCORE: 10
ADLS_ACUITY_SCORE: 6
ADLS_ACUITY_SCORE: 8
ADLS_ACUITY_SCORE: 6
ADLS_ACUITY_SCORE: 10
ADLS_ACUITY_SCORE: 8
ADLS_ACUITY_SCORE: 6
ADLS_ACUITY_SCORE: 8
ADLS_ACUITY_SCORE: 6
ADLS_ACUITY_SCORE: 8
ADLS_ACUITY_SCORE: 6
ADLS_ACUITY_SCORE: 8
ADLS_ACUITY_SCORE: 10
ADLS_ACUITY_SCORE: 8
ADLS_ACUITY_SCORE: 8
ADLS_ACUITY_SCORE: 6
ADLS_ACUITY_SCORE: 8
ADLS_ACUITY_SCORE: 6
ADLS_ACUITY_SCORE: 8
ADLS_ACUITY_SCORE: 8

## 2021-11-17 ASSESSMENT — MIFFLIN-ST. JEOR
SCORE: 3194.28
SCORE: 3221.49

## 2021-11-17 NOTE — PLAN OF CARE
Problem: Adult Inpatient Plan of Care  Goal: Optimal Comfort and Wellbeing  Outcome: Improving   Pt alert and oriented and able to verbalize needs.   Problem: Respiratory Compromise (Pneumonia)  Goal: Effective Oxygenation and Ventilation  Outcome: Improving  Intervention: Promote Airway Secretion Clearance  Recent Flowsheet Documentation  Taken 11/16/2021 1615 by Reshma Maldonado RN  Cough And Deep Breathing: done with encouragement   Pt has a productive cough. Cough medicine given x 2 this shift. IV abx started. Pt ambulating independent in room with steady gait.

## 2021-11-17 NOTE — PLAN OF CARE
Problem: Respiratory Compromise (Pneumonia)  Goal: Effective Oxygenation and Ventilation  Outcome: Improving   Oxygen sats 92-95% on room air while in my care. Lung sound diminished all fields but coarse with exp wheezes heard on left side. Congested productive cough.  Did have some dyspnea on exertion.     Problem: Adult Inpatient Plan of Care  Goal: Optimal Comfort and Wellbeing  Outcome: Improving   Patient slept all afternoon. Denied pain this shift. Will transfer to room 3104 once room is cleaned.

## 2021-11-17 NOTE — PROVIDER NOTIFICATION
Provider notified. Pt reported feeling anxious. Pacing around the room. Asking for something to help with anxiety.     Discussed with provider. New orders for ativan 1mg PO once. Will administer and intervene as needed.

## 2021-11-17 NOTE — PLAN OF CARE
Problem: Infection (Pneumonia)  Goal: Resolution of Infection Signs and Symptoms  Outcome: No Change    Continues on IV antibiotics.      Problem: Respiratory Compromise (Pneumonia)  Goal: Effective Oxygenation and Ventilation  Outcome: No Change    Able to wean O2 down from 3 to 2 liters via NC overnight. Sating in the low-mid 90's.    A&Ox4, pleasant. VSS. C/o mild H/A. PRN Tylenol given. PIV SL; intermittent antibiotics. LS diminished. VU. Frequent non-productive cough. PRN Robitussin given. Using urinal @ bedside. Up SBA. Discharge pending respiratory status and BGM control.

## 2021-11-17 NOTE — PROGRESS NOTES
Sunil is doing well this morning. Currently on RA. Did not qualify for home O2 after working with respiratory therapy but plan is for overnight oximetry study tonight. Continues to have a productive cough. Blood sugars stable. Up independent in his room.     He is transferring to the PT room at this time. Report given to DIONNA Wang.

## 2021-11-17 NOTE — PROGRESS NOTES
"[facility] MEDICINE PROGRESS NOTE      Code Status: Full Code       Identification/Summary:   Sunil Rondon is a 36 year old male with a past medical history of developmental delay, anxiety, depression, GERD, DM 2, obstructive sleep apnea noncompliant with CPAP, morbid obesity BMI 62 presented with shortness of breath, cough who was admitted on 11/13/2021 for left-sided community-acquired pneumonia noted on CT, leukocytosis.  He was found to be hypoglycemic with a blood sugars of 54.  Started on D5 normal saline.  Last A1c 6.6.             Assessment and Plan:     CAP  --Improving  --Discontinue Rocephin  --Discontinue Zithromax  --Discontinue Solu-Medrol  --Oxygen support as needed  --Sleep evaluation with oximetry    Elevated blood sugars  --Stable  --Improved  --Continue Metformin  --Restart Glucotrol    Morbid obesity  --Patient management    Bipolar disorder  --Continue Klonopin, Lamictal, Neurontin and Zoloft      Disposition: Possibly home tomorrow      Erika Guerrier MD, MD  Hospitalist  [facility] Medicine  [facility]  Phone: #[phone]         Subjective:     Mild interval improvement.  Requiring mildly less oxygen.    Physical Exam/Objective:  BP (!) 163/83   Pulse 62   Temp 97.7  F (36.5  C) (Oral)   Resp 22   Ht 1.905 m (6' 3\")   Wt (!) 219.6 kg (484 lb 1.6 oz)   SpO2 90%   BMI 60.51 kg/m      Constitutional: awake, alert, cooperative, no apparent distress,  Respiratory: Lungs clear, no adventitious sounds  Cardiovascular: RRR, Nl S1, S2  Skin: Pink and dry  Neurologic: Moves all four extremities, pupils equal  Neuropsychiatric:  Alert, resting quietly    Medications:   Amlodipine, Lipitor, Zithromax, Rocephin, Klonopin, Lovenox, Pepcid, Neurontin, DuoNeb's, Lamictal, losartan, Solu-Medrol, metoprolol, Protonix, Zoloft, Carafate, Zanaflex    Labs: Glucose 130    Imaging: Chest x-ray shows no evidence of infiltrates      Current " Diet  Orders Placed This Encounter      Combination Diet Regular Diet Adult    Supplements  None

## 2021-11-17 NOTE — PROGRESS NOTES
Pt did not qualify for home O2.  Sating 90% at rest and with activity.  Pt did desat to 86% on RA while sleeping.  Overnight oximeter study ordered for tonight.  RT will continue to monitor.

## 2021-11-17 NOTE — PROGRESS NOTES
RESPIRATORY CARE NOTE     Patient Name: Sunil Rondon  Today's Date: 11/16/2021       Pt continues to receive duoneb. BS are diminished. Pt is on 3 lpm of oxygen via NC, SpO2 is 93%. No change post nebs. RT will continue to monitor and assess.     Sameera Soriano, RT

## 2021-11-18 ENCOUNTER — DOCUMENTATION ONLY (OUTPATIENT)
Dept: MEDSURG UNIT | Facility: CLINIC | Age: 36
End: 2021-11-18
Payer: MEDICAID

## 2021-11-18 VITALS
TEMPERATURE: 98.3 F | WEIGHT: 315 LBS | RESPIRATION RATE: 22 BRPM | DIASTOLIC BLOOD PRESSURE: 71 MMHG | SYSTOLIC BLOOD PRESSURE: 135 MMHG | HEART RATE: 97 BPM | HEIGHT: 75 IN | BODY MASS INDEX: 39.17 KG/M2 | OXYGEN SATURATION: 97 %

## 2021-11-18 LAB
GLUCOSE BLDC GLUCOMTR-MCNC: 104 MG/DL (ref 70–99)
GLUCOSE BLDC GLUCOMTR-MCNC: 89 MG/DL (ref 70–99)
MAGNESIUM SERPL-MCNC: 1.8 MG/DL (ref 1.8–2.6)
POTASSIUM BLD-SCNC: 3.7 MMOL/L (ref 3.5–5)

## 2021-11-18 PROCEDURE — 99207 PR NO BILLABLE SERVICE THIS VISIT: CPT | Performed by: FAMILY MEDICINE

## 2021-11-18 PROCEDURE — 250N000013 HC RX MED GY IP 250 OP 250 PS 637: Performed by: FAMILY MEDICINE

## 2021-11-18 PROCEDURE — 99207 PR CDG-CODE INCORRECT PER BILLING BASED ON TIME: CPT | Performed by: FAMILY MEDICINE

## 2021-11-18 PROCEDURE — 84132 ASSAY OF SERUM POTASSIUM: CPT | Performed by: FAMILY MEDICINE

## 2021-11-18 PROCEDURE — 99239 HOSP IP/OBS DSCHRG MGMT >30: CPT | Performed by: FAMILY MEDICINE

## 2021-11-18 PROCEDURE — 250N000011 HC RX IP 250 OP 636: Performed by: INTERNAL MEDICINE

## 2021-11-18 PROCEDURE — 36415 COLL VENOUS BLD VENIPUNCTURE: CPT | Performed by: FAMILY MEDICINE

## 2021-11-18 PROCEDURE — 83735 ASSAY OF MAGNESIUM: CPT | Performed by: FAMILY MEDICINE

## 2021-11-18 PROCEDURE — 999N000157 HC STATISTIC RCP TIME EA 10 MIN

## 2021-11-18 PROCEDURE — 250N000013 HC RX MED GY IP 250 OP 250 PS 637: Performed by: INTERNAL MEDICINE

## 2021-11-18 PROCEDURE — 94761 N-INVAS EAR/PLS OXIMETRY MLT: CPT

## 2021-11-18 RX ORDER — BENZONATATE 100 MG/1
100 CAPSULE ORAL 3 TIMES DAILY PRN
Qty: 30 CAPSULE | Refills: 0 | Status: SHIPPED | OUTPATIENT
Start: 2021-11-18 | End: 2023-08-20

## 2021-11-18 RX ADMIN — AMLODIPINE BESYLATE 5 MG: 5 TABLET ORAL at 13:20

## 2021-11-18 RX ADMIN — GABAPENTIN 800 MG: 400 CAPSULE ORAL at 13:21

## 2021-11-18 RX ADMIN — SUCRALFATE 1 G: 1 TABLET ORAL at 13:22

## 2021-11-18 RX ADMIN — ENOXAPARIN SODIUM 40 MG: 100 INJECTION SUBCUTANEOUS at 13:23

## 2021-11-18 RX ADMIN — PANTOPRAZOLE SODIUM 40 MG: 20 TABLET, DELAYED RELEASE ORAL at 13:20

## 2021-11-18 RX ADMIN — GLIPIZIDE 10 MG: 10 TABLET, EXTENDED RELEASE ORAL at 13:21

## 2021-11-18 RX ADMIN — FAMOTIDINE 20 MG: 20 TABLET ORAL at 13:21

## 2021-11-18 RX ADMIN — METOPROLOL SUCCINATE 200 MG: 50 TABLET, EXTENDED RELEASE ORAL at 13:22

## 2021-11-18 RX ADMIN — LOSARTAN POTASSIUM 100 MG: 50 TABLET, FILM COATED ORAL at 13:19

## 2021-11-18 RX ADMIN — BENZONATATE 100 MG: 100 CAPSULE ORAL at 02:12

## 2021-11-18 RX ADMIN — METFORMIN HYDROCHLORIDE 1000 MG: 500 TABLET, FILM COATED ORAL at 13:22

## 2021-11-18 RX ADMIN — AMOXICILLIN AND CLAVULANATE POTASSIUM 1 TABLET: 875; 125 TABLET, FILM COATED ORAL at 13:18

## 2021-11-18 RX ADMIN — ENOXAPARIN SODIUM 40 MG: 100 INJECTION SUBCUTANEOUS at 02:12

## 2021-11-18 RX ADMIN — ACETAMINOPHEN 650 MG: 325 TABLET ORAL at 13:27

## 2021-11-18 RX ADMIN — SERTRALINE 300 MG: 100 TABLET, FILM COATED ORAL at 13:19

## 2021-11-18 ASSESSMENT — ACTIVITIES OF DAILY LIVING (ADL)
ADLS_ACUITY_SCORE: 10
ADLS_ACUITY_SCORE: 10
ADLS_ACUITY_SCORE: 6
ADLS_ACUITY_SCORE: 6
ADLS_ACUITY_SCORE: 10
ADLS_ACUITY_SCORE: 10
ADLS_ACUITY_SCORE: 8
ADLS_ACUITY_SCORE: 6
ADLS_ACUITY_SCORE: 6
ADLS_ACUITY_SCORE: 8
ADLS_ACUITY_SCORE: 8
ADLS_ACUITY_SCORE: 6
ADLS_ACUITY_SCORE: 10
ADLS_ACUITY_SCORE: 10
ADLS_ACUITY_SCORE: 6
ADLS_ACUITY_SCORE: 10
ADLS_ACUITY_SCORE: 6
ADLS_ACUITY_SCORE: 6

## 2021-11-18 NOTE — PROGRESS NOTES
Received intake call for home oxygen at 10:14am from my coworker Yohannes. Reviewed chart, need signed order and F2F note. Patient also has coverage through [facility], and [facility] is not contracted for oxygen through [facility]. Because of this, order will need to be sent out to [facility].   10:19am- I called RT who called order in, Sherin, and left voicemail letting her know what documentation would be needed in order for me to send out referral.   11:21am- Documentation is in the chart. I called patient's room to offer choice, line kept ringing and then hung up. I then called his home phone, no answer.  11:26am- I called the unit and spoke to nurse Ana. She told me that patient has been sleeping heavily all morning and they haven't been able to wake him up to talk.   11:28am- I called [facility] to get the best fax number to send out documentation to. Spoke to Karlee, she gave me fax number and said they would reach out once received to confirm order.   1:16pm- I received voicemail from Eva at [facility]. Called her back, she told me that they received the order, dispatched it to the branch, and will be in contact with patient/reach out to care coordinator.

## 2021-11-18 NOTE — PLAN OF CARE
Problem: Adult Inpatient Plan of Care  Goal: Plan of Care Review  Outcome: No Change   Patient is alert and oriented, patient is independent in the room, patient denies having any pain and has been resting comfortably. Patients lung sounds are diminished, patient has productive cough, respirations are shallow.

## 2021-11-18 NOTE — PROGRESS NOTES
Care Management Discharge Note    Discharge Date: 11/18/2021     Discharge Disposition:  home    Discharge Services:  Resumption of home care services for Skilled Nursing    Discharge DME:  none    Discharge Transportation:      Private pay costs discussed: Not applicable    PAS Confirmation Code:    Patient/family educated on Medicare website which has current facility and service quality ratings:      Education Provided on the Discharge Plan:  yes  Persons Notified of Discharge Plans: pt and home care RN  Patient/Family in Agreement with the Plan:  yes    Handoff Referral Completed: Yes    Additional Information:  Met with pt to discuss discharge planning. He was very sleepy, but stated he understands he will be going home with Oxygen and states he has a home care nurse who manages his medications. He states he usually uses [employer] for transportation, but can use [facility] if needed.     Call placed to Deloris home care RN ([phone]) and left message requesting return call.    2:00 PM  Another call placed to Deloris home care RN and left message requesting return call.    2:40 PM  Spoke with Deloris. She states she is pt's home care RN from [facility]. She makes visits weekly to manage medications for pt. Informed that pt will be discharged with home Oxygen and requested she f/u with pt for management. She states she will call pt this evening to f/u and will be available prn for needs. She plans next visit on Monday, 11/22/2022.  Faxed orders to [phone] per Deloris's request.  She also requested pt's new meds be filled at a pharmacy near the hospital so pt can pick them up on the way home. Rxs have been sent to [facility] in [place].    Spoke with discharge nurse and provided update. She is working on coordinating delivery of Oxygen with [facility].  Pt will need education prior to discharge since Oxygen is new for him.    3:10 PM  Call placed to [facility] to check on availability  of transportation and hours. Left message requesting return call.    3:40 PM  Call placed to pt's father, Andrew and provided update regarding discharge. He states he can  pt and transport home. He states he will stop by pharmacy to help  Rxs on the way home.  He states he lives about 5 miles away and can come any time. He will come to pt's room to receive Oxygen education along with pt.    Call placed to pt's nurse, Mayo to provide update. He will plan to call pt's father once Oxygen arrives and will provide education to pt and father.    Sadie Pa RN

## 2021-11-18 NOTE — DISCHARGE SUMMARY
[facility] MEDICINE  DISCHARGE SUMMARY     Primary Care Physician: Bertrand Valle  Admission Date: 11/13/2021   Discharge Provider: Erika Guerrier MD, MD Discharge Date: 11/18/2021   Diet:   Active Diet and Nourishment Order   Procedures     Combination Diet Regular Diet Adult     Diet       Code Status: Full Code   Activity: As tolerated           REASON FOR PRESENTATION(See Admission Note for Details)   Cough and shortness of breath    PRINCIPAL & ACTIVE DISCHARGE DIAGNOSES     Active Problems:    Community acquired pneumonia of left lower lobe of lung      PENDING LABS     Unresulted Labs Ordered in the Past 30 Days of this Admission     Date and Time Order Name Status Description    11/14/2021 12:18 AM Blood Culture Hand, Left Preliminary     11/14/2021 12:18 AM Blood Culture Hand, Right Preliminary             PROCEDURES ( this hospitalization only)          RECOMMENDATIONS TO OUTPATIENT PROVIDER FOR F/U VISIT     Patient should be reevaluated within 5 days for repeat chest x-ray and evaluation of pneumonia.    DISPOSITION   Home with home O2 at night      SUMMARY OF HOSPITAL COURSE:      36-year-old gentleman presents emergency department on 11/13/2021 and was admitted due to shortness of breath and findings consistent with TB acquired pneumonia.  He was started on Rocephin and Zithromax IV.  Cultures remain negative and the patient had a good recovery.  He was transferred to Augmentin orally and tolerated this well.  He was noted to have decreased oxygen saturations at night and required up to 2 L to keep him in normal levels.  Recommend outpatient sleep study at the discretion of his primary care physician, and home oxygen at night.  Home oxygen orders were placed.  (I attempted to contact father with no success).    Discharge Medications with Med changes:     Current Discharge Medication List      START taking these medications    Details    amoxicillin-clavulanate (AUGMENTIN) 875-125 MG tablet Take 1 tablet by mouth every 12 hours for 10 days  Qty: 20 tablet, Refills: 0    Associated Diagnoses: Community acquired pneumonia of left lower lobe of lung      benzonatate (TESSALON) 100 MG capsule Take 1 capsule (100 mg) by mouth 3 times daily as needed for cough  Qty: 30 capsule, Refills: 0    Associated Diagnoses: Community acquired pneumonia of left lower lobe of lung         CONTINUE these medications which have NOT CHANGED    Details   albuterol (PROAIR HFA/PROVENTIL HFA/VENTOLIN HFA) 108 (90 Base) MCG/ACT inhaler Inhale 2 puffs into the lungs every 6 hours as needed       amLODIPine (NORVASC) 5 MG tablet [AMLODIPINE (NORVASC) 5 MG TABLET] Take 1 tablet (5 mg total) by mouth daily.  Qty: 30 tablet, Refills: 0    Associated Diagnoses: Essential hypertension      atorvastatin (LIPITOR) 20 MG tablet Take 20 mg by mouth At Bedtime      canagliflozin (INVOKANA) 300 MG tablet Take 300 mg by mouth every morning (before breakfast)      !! cloZAPine (CLOZARIL) 100 MG tablet [CLOZAPINE (CLOZARIL) 100 MG TABLET] Take 3 tablets (300 mg total) by mouth at bedtime. May use home supply  Qty: 42 tablet, Refills: 0    Associated Diagnoses: Bipolar affective disorder, remission status unspecified (H)      !! cloZAPine (CLOZARIL) 50 MG tablet Take 50 mg by mouth At Bedtime      famotidine (PEPCID) 20 MG tablet [FAMOTIDINE (PEPCID) 20 MG TABLET] Take 20 mg by mouth 2 (two) times a day.      !! gabapentin (NEURONTIN) 800 MG tablet Take 800 mg by mouth daily as needed (anxiety)       !! gabapentin (NEURONTIN) 800 MG tablet Take 800 mg by mouth 2 times daily       glipiZIDE (GLUCOTROL XL) 10 MG 24 hr tablet Take 10 mg by mouth daily      ibuprofen (ADVIL/MOTRIN) 200 MG tablet Take 600 mg by mouth every 6 hours as needed for mild pain      insulin glargine (LANTUS PEN) 100 UNIT/ML pen Inject 30 Units Subcutaneous At Bedtime      lamoTRIgine (LAMICTAL) 150 MG tablet  [LAMOTRIGINE (LAMICTAL) 150 MG TABLET] Take 300 mg by mouth at bedtime.             liraglutide (VICTOZA) 18 MG/3ML solution Inject 1.8 mg Subcutaneous daily      losartan (COZAAR) 100 MG tablet [LOSARTAN (COZAAR) 100 MG TABLET] Take 100 mg by mouth daily.      metFORMIN (GLUCOPHAGE) 1000 MG tablet Take 1,000 mg by mouth 2 times daily (with meals)       metoprolol succinate ER (TOPROL-XL) 200 MG 24 hr tablet Take 200 mg by mouth daily      omeprazole (PRILOSEC) 40 MG capsule [OMEPRAZOLE (PRILOSEC) 40 MG CAPSULE] Take 40 mg by mouth 2 (two) times a day.      polyethylene glycol (MIRALAX) 17 gram packet [POLYETHYLENE GLYCOL (MIRALAX) 17 GRAM PACKET] Take 17 g by mouth daily as needed.      sertraline (ZOLOFT) 100 MG tablet [SERTRALINE (ZOLOFT) 100 MG TABLET] Take 300 mg by mouth daily.      sucralfate (CARAFATE) 1 GM tablet Take 1 g by mouth 4 times daily      tiZANidine (ZANAFLEX) 2 MG tablet Take 2 mg by mouth At Bedtime       !! - Potential duplicate medications found. Please discuss with provider.                  Consults     None    Immunizations given this encounter     Most Recent Immunizations   Administered Date(s) Administered     COVID-19,PF,Moderna 04/02/2021     FLU 6-35 months 10/08/2016     Influenza (IIV3) PF 11/05/2013     Influenza Vaccine IM > 6 months Valent IIV4 (Alfuria,Fluzone) 12/08/2020     TDAP Vaccine (Boostrix) 11/13/2011           Anticoagulation Information    None      SIGNIFICANT IMAGING FINDINGS     Results for orders placed or performed during the hospital encounter of 11/13/21   XR Chest 2 Views    Impression    IMPRESSION: Minimal fibroatelectasis in the left lateral costophrenic angle are noted. Lungs are otherwise clear. Groundglass opacities noted on the prior CT are not visible by plain film nor have  they progressed. Heart and pulmonary vascularity are   normal.         SIGNIFICANT LABORATORY FINDINGS   None    Discharge Orders        Reason for your hospital stay    Please  see AVS     Activity    Activity as jasiel     Follow-up and recommended labs and tests    Please see your primary doctor within 5 days to recheck a chest x-ray, and to recheck your current medications.     Full Code     Oxygen Adult/Peds    Oxygen Documentation:   I certify that this patient, Sunil Rondon has been under my care (or a nurse practitioner or physican's assistant working with me). This is the face-to-face encounter for oxygen medical necessity.      Sunil Rondon is now in a chronic stable state and continues to require supplemental oxygen. Patient has continued oxygen desaturation due to cap.    Alternative treatment(s) tried or considered and deemed clinically infective for treatment of cap include nebulizers.  If portability is ordered, is the patient mobile within the home? yes     Diet    Diabetic diet       Examination   Alert yet somnolent  Lungs-no adventitious sounds  Heart-regular rate and rhythm  Skin -Pink and dry      Please see EMR for more detailed significant labs, imaging, consultant notes etc.    > 35 minutes spent in discharge process.    Erika Guerrier MD, MD  [facility]    CC:Bertrand Valle

## 2021-11-18 NOTE — PROGRESS NOTES
"[facility] MEDICINE PROGRESS NOTE      Code Status: Full Code       Identification/Summary:   Sunil Rondon is a 36 year old male with a past medical history of developmental delay, anxiety, depression, GERD, DM 2, obstructive sleep apnea noncompliant with CPAP, morbid obesity BMI 62 presented with shortness of breath, cough who was admitted on 11/13/2021 for left-sided community-acquired pneumonia noted on CT, leukocytosis.  He was found to be hypoglycemic with a blood sugars of 54.  Started on D5 normal saline.  Last A1c 6.6.             Assessment and Plan:     CAP  --Improving  --Continues on Augmentin    Hypoxia  --Mild desaturation overnight  --Home O2 eval    DM2  --Stable  --Improved  --Continue Metformin  --Restart Glucotrol    Morbid obesity  --Patient management    Bipolar disorder  --Continue Klonopin, Lamictal, Neurontin and Zoloft      Disposition: Possibly home tomorrow      Erika Guerrier MD, MD  [place] Hospitalist  [facility]  [facility]  Phone: #[phone]         Subjective:     No interval change. Patient desaturated overnight down to 86. Blood sugars remained stable.    Physical Exam/Objective:  /59 (BP Location: Right arm)   Pulse 93   Temp 97.6  F (36.4  C) (Oral)   Resp 20   Ht 1.905 m (6' 3\")   Wt (!) 216.9 kg (478 lb 1.6 oz)   SpO2 93%   BMI 59.76 kg/m      Constitutional: awake, alert, cooperative, no apparent distress,  Respiratory: Lungs clear, no adventitious sounds  Cardiovascular: RRR, Nl S1, S2  Skin: Pink and dry  Neurologic: Moves all four extremities, pupils equal  Neuropsychiatric:  Alert, resting quietly    Medications:   Amlodipine, Lipitor, Zithromax, Rocephin, Klonopin, Lovenox, Pepcid, Neurontin, DuoNeb's, Lamictal, losartan, Solu-Medrol, metoprolol, Protonix, Zoloft, Carafate, Zanaflex    Labs: Glucose 104        Current Diet  Orders Placed This Encounter      Combination Diet Regular Diet " Adult    Supplements  None

## 2021-11-18 NOTE — DISCHARGE INSTRUCTIONS
Bertrand Valle    General - Internal Medicine    [phone]   It is recommended to follow up with your primary care provider in 5 days.      Oxygen Provider:  Arranged through [facility], contact number [phone]. If you have any questions for concerns please call the oxygen company directly.

## 2021-11-18 NOTE — PLAN OF CARE
Problem: Fluid Imbalance (Pneumonia)  Goal: Fluid Balance  Outcome: Improving     Problem: Infection (Pneumonia)  Goal: Resolution of Infection Signs and Symptoms  Outcome: Improving   Pt was very sleepy all day and didn't wake up until 1300.  Tried several times to wake him up and he would wake for a second and then fall back to sleep.  He will be discharging later with night time O2.  Pt c/o a headache when he woke up and was given some tylenol.  Pt is a standby assist to the bathroom.  He continues to have a very harsh cough.  Will continue to monitor.

## 2021-11-18 NOTE — PLAN OF CARE
Problem: Respiratory Compromise (Pneumonia)  Goal: Effective Oxygenation and Ventilation  Intervention: Promote Airway Secretion Clearance  Recent Flowsheet Documentation  Taken 11/17/2021 1700 by Carri Disla RN  Cough And Deep Breathing: done with encouragement  Intervention: Optimize Oxygenation and Ventilation  Recent Flowsheet Documentation  Taken 11/17/2021 1700 by Carri Disla RN  Head of Bed (HOB) Positioning: HOB at 30-45 degrees     Problem: Adult Inpatient Plan of Care  Goal: Absence of Hospital-Acquired Illness or Injury  Intervention: Identify and Manage Fall Risk  Recent Flowsheet Documentation  Taken 11/17/2021 1700 by Carri Disla RN  Safety Promotion/Fall Prevention:    activity supervised    bed alarm on    fall prevention program maintained    nonskid shoes/slippers when out of bed    room near nurse's station    safety round/check completed    room organization consistent     Pt alert and oriented. Pt verbalized feeling SOB and was very restless. See provider notification note. VSS. Lung sounds diminished with productive cough. Dyspnea on exertion. Denies N/V. IS initiated and completed with encouragement, tolerating well. Plan to monitor O2 sat continuously and discharge home, possibly with oxygen supplementation.

## 2021-11-18 NOTE — PROGRESS NOTES
Oxygen Documentation:   I certify that this patient, Sunil Rondon has been under my care (or a nurse practitioner or physican's assistant working with me). This is the face-to-face encounter for oxygen medical necessity.      Sunil Rondon is now in a chronic stable state and continues to require supplemental oxygen. Patient has continued oxygen desaturation due to cap.    Alternative treatment(s) tried or considered and deemed clinically infective for treatment of cap include nebulizers.  If portability is ordered, is the patient mobile within the home? yes    **Patients who qualify for home O2 coverage under the CMS guidelines require ABG tests or O2 sat readings obtained closest to, but no earlier than 2 days prior to the discharge, as evidence of the need for home oxygen therapy. Testing must be performed while patient is in the chronic stable state. See notes for O2 sats.**

## 2021-11-19 ENCOUNTER — PATIENT OUTREACH (OUTPATIENT)
Dept: CARE COORDINATION | Facility: CLINIC | Age: 36
End: 2021-11-19
Payer: MEDICAID

## 2021-11-19 DIAGNOSIS — Z71.89 OTHER SPECIFIED COUNSELING: ICD-10-CM

## 2021-11-19 LAB
BACTERIA BLD CULT: NO GROWTH
BACTERIA BLD CULT: NO GROWTH

## 2021-11-19 NOTE — PROGRESS NOTES
Clinic Care Coordination Contact  [facility]/Voicemail       Clinical Data: Care Coordinator Outreach  Outreach attempted x 1.  Left message on patient's voicemail with call back information and requested return call.  Plan: Care Coordinator will try to reach patient again in 1-2 business days.    LATISHA Dexter  [phone]  [facility]

## 2021-11-19 NOTE — PLAN OF CARE
Pt is alert to self and somewhat to situation. He was given education on home O2 use by the O2 providing company rep. Pt states that he video taped the teaching with his phone for better recall. Pt's father was picking him up to go home. My colleague escorted pt to the door in a wheelchair and went over the discharge instructions with pt & his father.

## 2021-11-20 NOTE — PROGRESS NOTES
Clinic Care Coordination Contact  [facility]/Voicemail       Clinical Data: Care Coordinator Outreach  Outreach attempted x 2.  Left message on patient's voicemail with call back information and requested return call.  Plan:  Care Coordinator will do no further outreaches at this time.      LATISHA Eldridge  [phone]  [facility]

## 2021-12-12 LAB
ATRIAL RATE - MUSE: 105 BPM
DIASTOLIC BLOOD PRESSURE - MUSE: 67 MMHG
INTERPRETATION ECG - MUSE: NORMAL
P AXIS - MUSE: 50 DEGREES
PR INTERVAL - MUSE: 184 MS
QRS DURATION - MUSE: 108 MS
QT - MUSE: 358 MS
QTC - MUSE: 473 MS
R AXIS - MUSE: 82 DEGREES
SYSTOLIC BLOOD PRESSURE - MUSE: 122 MMHG
T AXIS - MUSE: 38 DEGREES
VENTRICULAR RATE- MUSE: 105 BPM

## 2022-03-06 ENCOUNTER — HOSPITAL ENCOUNTER (EMERGENCY)
Facility: CLINIC | Age: 37
Discharge: HOME | End: 2022-03-06
Attending: EMERGENCY MEDICINE | Admitting: EMERGENCY MEDICINE
Payer: MEDICAID

## 2022-03-06 VITALS
DIASTOLIC BLOOD PRESSURE: 61 MMHG | SYSTOLIC BLOOD PRESSURE: 130 MMHG | HEIGHT: 76 IN | TEMPERATURE: 98.5 F | RESPIRATION RATE: 18 BRPM | HEART RATE: 95 BPM | OXYGEN SATURATION: 93 % | WEIGHT: 315 LBS | BODY MASS INDEX: 38.36 KG/M2

## 2022-03-06 DIAGNOSIS — S31.801A LACERATION OF BUTTOCK, UNSPECIFIED LATERALITY, INITIAL ENCOUNTER: ICD-10-CM

## 2022-03-06 PROCEDURE — 99283 EMERGENCY DEPT VISIT LOW MDM: CPT

## 2022-03-06 PROCEDURE — 250N000013 HC RX MED GY IP 250 OP 250 PS 637: Performed by: EMERGENCY MEDICINE

## 2022-03-06 RX ORDER — IBUPROFEN 600 MG/1
600 TABLET, FILM COATED ORAL ONCE
Status: COMPLETED | OUTPATIENT
Start: 2022-03-06 | End: 2022-03-06

## 2022-03-06 RX ADMIN — IBUPROFEN 600 MG: 600 TABLET ORAL at 22:19

## 2022-03-07 NOTE — ED PROVIDER NOTES
EMERGENCY DEPARTMENT ENCOUNTER      NAME: Sunil Rondon  AGE: 36 year old male  YOB: 1985  MRN: [id number]  EVALUATION DATE & TIME: 3/6/2022  9:58 PM    PCP: Bertrand Valle    ED PROVIDER: Gabriela Sierra MD    Chief Complaint   Patient presents with     Injury         FINAL IMPRESSION:  1. Laceration of buttock, unspecified laterality, initial encounter          ED COURSE & MEDICAL DECISION MAKING:    Pertinent Labs & Imaging studies reviewed. (See chart for details)  36 year old male with history of depression, anxiety, schizoaffective disorder, HTN, DM who presents to the Emergency Department for evaluation of pain and bleeding from his buttocks after he slipped off of the seat on a rowing machine to the metal piece underneath the seat.  On examination has a 2 mm laceration along the gluteal cleft that is hemostatic.  Does not warrant any repair.  Patient counseled regarding same.  He does not have any significant tenderness to palpation of the underlying bony anatomy, sacrum/coccyx.  Does not warrant any imaging.  Safe for discharge to home.           10:10 PM I met the patient and performed my initial interview and exam.      At the conclusion of the encounter I discussed the results of all of the tests and the disposition. The questions were answered. The patient or family acknowledged understanding and was agreeable with the care plan.    MEDICATIONS GIVEN IN THE EMERGENCY:  Medications   ibuprofen (ADVIL/MOTRIN) tablet 600 mg (has no administration in time range)       NEW PRESCRIPTIONS STARTED AT TODAY'S ER VISIT  New Prescriptions    No medications on file          =================================================================    HPI    Patient information was obtained from: Patient    Use of Intrepreter: N/A      Sunil Rondon is a 36 year old male with no pertinent medical history who presents to this ED for evaluation of buttock cut.     Patient reports that he was on the rowing  machine at the gym and the seat slid away from him and he fell on his butt on the metal pole. Wound was bleeding a little. Notes his butt is aching from the fall. He has not taken anything for pain. Denies any other complaints.       REVIEW OF SYSTEMS  Constitutional:  Denies fever, chills, weight loss or weakness  GI:  Denies abdominal pain, nausea, vomiting, diarrhea  : Denies dysuria, denies hematuria  Musculoskeletal: Positive for buttock pain. Denies any swelling or loss of function.  Skin: Positive for laceration. Denies rash, pallor.   Neurologic:  Denies headache, focal weakness or sensory changes  All other systems negative unless noted in HPI.      PAST MEDICAL HISTORY:  Past Medical History:   Diagnosis Date     Anxiety      Bipolar 1 disorder (H)      Borderline mental retardation      Depression      Diabetes mellitus (H)      Gender identity disorder      GERD (gastroesophageal reflux disease)      Glucose intolerance (impaired glucose tolerance)      Hypertension      Obesity      Panic attacks      Schizoaffective disorder (H)      Sinus tarsi syndrome      Sleep apnea      Suicidal ideation        PAST SURGICAL HISTORY:  Past Surgical History:   Procedure Laterality Date     HC KNEE SCOPE,SINGLE MENISECTOMY Left 6/8/2016    Procedure: LEFT KNEE ARTHROSCOPY, PARTIAL MEDIAL MENISCECTOMY, LOOSE BODY REMOVAL, CHONDROPLASTY;  Surgeon: Sky Goodwin MD;  Location: [facility];  Service: Orthopedics     OPEN REDUCTION INTERNAL FIXATION ANKLE Left 12/31/2018    Procedure: OPEN REDUCTION INTERNAL FIXATION, FRACTURE, LEFT ANKLE;  Surgeon: Jarad Mosqueda MD;  Location: [facility];  Service: Orthopedics     TENDON REPAIR Left     wrist     UPPER GASTROINTESTINAL ENDOSCOPY         CURRENT MEDICATIONS:    Prior to Admission Medications   Prescriptions Last Dose Informant Patient Reported? Taking?   albuterol (PROAIR HFA/PROVENTIL HFA/VENTOLIN HFA) 108 (90 Base) MCG/ACT inhaler   Yes No   Sig:  Inhale 2 puffs into the lungs every 6 hours as needed    amLODIPine (NORVASC) 5 MG tablet   No No   Sig: [AMLODIPINE (NORVASC) 5 MG TABLET] Take 1 tablet (5 mg total) by mouth daily.   atorvastatin (LIPITOR) 20 MG tablet   Yes No   Sig: Take 20 mg by mouth At Bedtime   benzonatate (TESSALON) 100 MG capsule   No No   Sig: Take 1 capsule (100 mg) by mouth 3 times daily as needed for cough   canagliflozin (INVOKANA) 300 MG tablet   Yes No   Sig: Take 300 mg by mouth every morning (before breakfast)   cloZAPine (CLOZARIL) 100 MG tablet   No No   Sig: [CLOZAPINE (CLOZARIL) 100 MG TABLET] Take 3 tablets (300 mg total) by mouth at bedtime. May use home supply   cloZAPine (CLOZARIL) 50 MG tablet   Yes No   Sig: Take 50 mg by mouth At Bedtime   famotidine (PEPCID) 20 MG tablet   Yes No   Sig: [FAMOTIDINE (PEPCID) 20 MG TABLET] Take 20 mg by mouth 2 (two) times a day.   gabapentin (NEURONTIN) 800 MG tablet   Yes No   Sig: Take 800 mg by mouth 2 times daily    gabapentin (NEURONTIN) 800 MG tablet   Yes No   Sig: Take 800 mg by mouth daily as needed (anxiety)    glipiZIDE (GLUCOTROL XL) 10 MG 24 hr tablet   Yes No   Sig: Take 10 mg by mouth daily   ibuprofen (ADVIL/MOTRIN) 200 MG tablet   Yes No   Sig: Take 600 mg by mouth every 6 hours as needed for mild pain   insulin glargine (LANTUS PEN) 100 UNIT/ML pen   Yes No   Sig: Inject 30 Units Subcutaneous At Bedtime   lamoTRIgine (LAMICTAL) 150 MG tablet   Yes No   Sig: [LAMOTRIGINE (LAMICTAL) 150 MG TABLET] Take 300 mg by mouth at bedtime.          liraglutide (VICTOZA) 18 MG/3ML solution   Yes No   Sig: Inject 1.8 mg Subcutaneous daily   losartan (COZAAR) 100 MG tablet   Yes No   Sig: [LOSARTAN (COZAAR) 100 MG TABLET] Take 100 mg by mouth daily.   metFORMIN (GLUCOPHAGE) 1000 MG tablet   Yes No   Sig: Take 1,000 mg by mouth 2 times daily (with meals)    metoprolol succinate ER (TOPROL-XL) 200 MG 24 hr tablet   Yes No   Sig: Take 200 mg by mouth daily   omeprazole (PRILOSEC) 40 MG  "capsule   Yes No   Sig: [OMEPRAZOLE (PRILOSEC) 40 MG CAPSULE] Take 40 mg by mouth 2 (two) times a day.   polyethylene glycol (MIRALAX) 17 gram packet   Yes No   Sig: [POLYETHYLENE GLYCOL (MIRALAX) 17 GRAM PACKET] Take 17 g by mouth daily as needed.   sertraline (ZOLOFT) 100 MG tablet   Yes No   Sig: [SERTRALINE (ZOLOFT) 100 MG TABLET] Take 300 mg by mouth daily.   sucralfate (CARAFATE) 1 GM tablet   Yes No   Sig: Take 1 g by mouth 4 times daily   tiZANidine (ZANAFLEX) 2 MG tablet   Yes No   Sig: Take 2 mg by mouth At Bedtime      Facility-Administered Medications: None       ALLERGIES:  No Known Allergies    FAMILY HISTORY:  No family history on file.    SOCIAL HISTORY:  Social History     Tobacco Use     Smoking status: Never Smoker     Smokeless tobacco: Never Used   Substance Use Topics     Alcohol use: No     Drug use: No        VITALS:  Patient Vitals for the past 24 hrs:   BP Temp Temp src Pulse Resp SpO2 Height Weight   03/06/22 2201 130/61 98.5  F (36.9  C) Oral 95 18 93 % 1.93 m (6' 4\") (!) 230.4 kg (508 lb)       PHYSICAL EXAM    General Appearance: Well-appearing, well-nourished, no acute distress   Head:  Normocephalic  Eyes:  conjunctiva/corneas clear  ENT:  membranes are moist without pallor  Cardio:  Regular rate and rhythm  Pulm:  No respiratory distress  Abdomen:  Soft, non-tender, non distended,no rebound or guarding. Morbidly obese.   Extremities: Moves all extremities normally, normal gait  Skin:  Skin warm, dry, no rashes. Gluteal cleft 2 mm laceration. Hemostatic.   Neuro:  Alert and oriented ×3, moving all extremities, no gross sensory defects       The creation of this record is based on the scribe s observations of the work being performed by Gabriela Sierra MD and the provider s statements to them. It was created on his behalf by Oswald Cisneros, a trained medical scribe. This document has been checked and approved by the attending provider.    Gabriela Sierra MD  Emergency Medicine  [facility] " [facility] EMERGENCY ROOM  [address] [address]  [phone]  Dept: [phone]     Gabriela Sierra MD  03/06/22 3016

## 2022-03-07 NOTE — ED TRIAGE NOTES
Pt reports he was trying the rowing machine at the gym about 90 minutes PTA. States he slipped off the back of the seat onto the metal support underneath, injuring himself near rectum. Bleeding has resolved, but has residual pain.

## 2022-05-10 ENCOUNTER — TELEPHONE (OUTPATIENT)
Dept: FAMILY MEDICINE | Facility: CLINIC | Age: 37
End: 2022-05-10
Payer: MEDICAID

## 2022-05-10 NOTE — TELEPHONE ENCOUNTER
I have never met or seen this patient, has not been to this clinic. Needs to have his PCP fill this out.

## 2022-05-10 NOTE — TELEPHONE ENCOUNTER
Forms Request  Name of form/paperwork: [facility]  Have you been seen for this request: N/ADo we have the form: yes, in providers inbox  When is form needed by: When completed  How would you like the form returned: Fax  Patient Notified form requests are processed in 3-5 business days: N/A    Okay to leave a detailed message? N/A

## 2022-05-10 NOTE — TELEPHONE ENCOUNTER
It appears this pt is being seen by Dr. Valle at [facility] in [place], he has only seen Dr. Guerrier in the ER. Fax was sent back with a note stating as such.

## 2022-08-08 ENCOUNTER — HOSPITAL ENCOUNTER (EMERGENCY)
Facility: CLINIC | Age: 37
Discharge: HOME | End: 2022-08-08
Admitting: NURSE PRACTITIONER
Payer: MEDICAID

## 2022-08-08 ENCOUNTER — APPOINTMENT (OUTPATIENT)
Dept: RADIOLOGY | Facility: CLINIC | Age: 37
End: 2022-08-08
Payer: MEDICAID

## 2022-08-08 VITALS
RESPIRATION RATE: 20 BRPM | HEART RATE: 105 BPM | TEMPERATURE: 98.1 F | SYSTOLIC BLOOD PRESSURE: 162 MMHG | WEIGHT: 315 LBS | DIASTOLIC BLOOD PRESSURE: 108 MMHG | OXYGEN SATURATION: 94 % | BODY MASS INDEX: 38.36 KG/M2 | HEIGHT: 76 IN

## 2022-08-08 DIAGNOSIS — S89.91XA KNEE INJURY, RIGHT, INITIAL ENCOUNTER: ICD-10-CM

## 2022-08-08 DIAGNOSIS — K64.9 HEMORRHOIDS, UNSPECIFIED HEMORRHOID TYPE: ICD-10-CM

## 2022-08-08 DIAGNOSIS — K59.00 CONSTIPATION: ICD-10-CM

## 2022-08-08 PROCEDURE — 73560 X-RAY EXAM OF KNEE 1 OR 2: CPT | Mod: RT

## 2022-08-08 PROCEDURE — 99283 EMERGENCY DEPT VISIT LOW MDM: CPT

## 2022-08-08 RX ORDER — POLYETHYLENE GLYCOL 3350 17 G/17G
1 POWDER, FOR SOLUTION ORAL DAILY
Qty: 527 G | Refills: 0 | Status: SHIPPED | OUTPATIENT
Start: 2022-08-08 | End: 2022-09-07

## 2022-08-08 ASSESSMENT — ENCOUNTER SYMPTOMS
RECTAL PAIN: 1
DIZZINESS: 1
CHILLS: 0
SHORTNESS OF BREATH: 1
NERVOUS/ANXIOUS: 1
BLOOD IN STOOL: 0
CONSTIPATION: 1
HEADACHES: 1
FEVER: 0
ANAL BLEEDING: 1
ABDOMINAL PAIN: 1

## 2022-08-08 NOTE — DISCHARGE INSTRUCTIONS
Take the stool softener as prescribed    Your knee xray does not show fracture. You should follow up with orthopedics for further evaluation of your right knee injury. You may need additional testing that cannot be performed in the ER.    Use the rectal suppositories as prescribed for your hemorrhoids.    Please follow up with your doctor within one week for recheck

## 2022-08-08 NOTE — ED TRIAGE NOTES
Comes from group home and is here with caregiver. One week history of rectal burning and noticing blood on toilet tissue.  States last night got out of bed and was having trouble moving right foot and twisted right knee. Faulk a pop and states that he thinks it might of been dislocated. Ambulatory with limp. Also has multiple complaints, head and abd pain. Still feels slight short of breath from previous ED visit and thinks that is due to anxiety.  History of ACL tear to right knee last year     Triage Assessment     Row Name 08/08/22 1035       Triage Assessment (Adult)    Airway WDL WDL       Respiratory WDL    Respiratory WDL WDL       Skin Circulation/Temperature WDL    Skin Circulation/Temperature WDL WDL       Cardiac WDL    Cardiac WDL WDL       Peripheral/Neurovascular WDL    Peripheral Neurovascular WDL WDL       Cognitive/Neuro/Behavioral WDL    Cognitive/Neuro/Behavioral WDL WDL

## 2022-08-08 NOTE — ED PROVIDER NOTES
EMERGENCY DEPARTMENT ENCOUNTER      NAME: Sunil Rondon  AGE: 37 year old male  YOB: 1985  MRN: [id number]  EVALUATION DATE & TIME: 2022 10:56 AM    PCP: Bertrand Valle    ED PROVIDER: VENICE Molina, CNP      Chief Complaint   Patient presents with     Knee Pain     Rectal Bleeding         FINAL IMPRESSION:  1. Knee injury, right, initial encounter    2. Constipation    3. Hemorrhoids, unspecified hemorrhoid type          ED COURSE & MEDICAL DECISION MAKIN:05 AM I met with the patient, obtained history, performed an initial exam, and discussed options and plan for treatment here in the ED.  12:35 PM updated patient    Pertinent Labs & Imaging studies reviewed. (See chart for details)  37 year old male presents to the Emergency Department for evaluation of right knee pain and rectal pain. Exam suspicions for hemorrhoid. Will trial suppositories and miralax. Right knee xray for fracture or dislocation. Will need follow up with orthopedics for ongoing evaluation. Recommend follow up with PCP for ongoing issues with headaches and abdominal pain. No further emergent workup at this time. Patient does have chronic suicidality per caregiver.  No need for crisis stabilization at this point.    Struck to follow-up with primary care sometime within the next week.  Additionally, given return precautions    At the conclusion of the encounter I discussed the results of all of the tests and the disposition. The questions were answered. The patient or family acknowledged understanding and was agreeable with the care plan.         MEDICATIONS GIVEN IN THE EMERGENCY:  Medications - No data to display    NEW PRESCRIPTIONS STARTED AT TODAY'S ER VISIT  New Prescriptions    PHENYLEPHRINE-COCOA BUTTER (PREPARATION H) 0.25-88.44 % SUPPOSITORY    Place 1 suppository rectally as needed for hemorrhoids or itching    POLYETHYLENE GLYCOL (MIRALAX) 17 GM/DOSE POWDER    Take 17 g (1 capful) by mouth daily for  30 days            =================================================================    HPI    Patient information was obtained from: patient, group home caregiver    Use of Intrepreter: N/A         Sunil Rondon is a 37 year old male with a history of morbid obesity, bipolar 1 disorder, mental retardation, depression, diabetes, GERD, hypertension, schizoaffective disorder, suicidal ideation who presents today for 2 main issues which include right knee pain and rectal pain.  Over the past week has noted pain with defecation.  Has been noting some blood when wiping around his rectum.  Concerned he may have hemorrhoids.  Notes that his stools are generally hard and quite large.  Not currently taking any stool softeners.  Does note some intermittent lower abdominal discomfort.  Denies fever, chills, nausea, vomiting.  Also recently reinjured his right knee.  Was try to get out of bed when he lost his footing and twisted the knee.  States he has a history of ACL tear and meniscal injury previously.  Has seen orthopedics in the past.  Currently able to ambulate but limping.  Has not taken anything yet for the discomfort.      REVIEW OF SYSTEMS   Review of Systems   Constitutional: Negative for chills and fever.   Respiratory: Positive for shortness of breath (ongoing).    Gastrointestinal: Positive for abdominal pain, anal bleeding, constipation and rectal pain. Negative for blood in stool.   Neurological: Positive for dizziness and headaches.   Psychiatric/Behavioral: Positive for suicidal ideas (chronic). Negative for self-injury. The patient is nervous/anxious.    All other systems reviewed and are negative.      PAST MEDICAL HISTORY:  Past Medical History:   Diagnosis Date     Anxiety      Bipolar 1 disorder (H)      Borderline mental retardation      Depression      Diabetes mellitus (H)      Gender identity disorder      GERD (gastroesophageal reflux disease)      Glucose intolerance (impaired glucose tolerance)       Hypertension      Obesity      Panic attacks      Schizoaffective disorder (H)      Sinus tarsi syndrome      Sleep apnea      Suicidal ideation        PAST SURGICAL HISTORY:  Past Surgical History:   Procedure Laterality Date     HC KNEE SCOPE,SINGLE MENISECTOMY Left 6/8/2016    Procedure: LEFT KNEE ARTHROSCOPY, PARTIAL MEDIAL MENISCECTOMY, LOOSE BODY REMOVAL, CHONDROPLASTY;  Surgeon: Sky Goodwin MD;  Location: [facility];  Service: Orthopedics     OPEN REDUCTION INTERNAL FIXATION ANKLE Left 12/31/2018    Procedure: OPEN REDUCTION INTERNAL FIXATION, FRACTURE, LEFT ANKLE;  Surgeon: Jarad Mosqueda MD;  Location: [facility];  Service: Orthopedics     TENDON REPAIR Left     wrist     UPPER GASTROINTESTINAL ENDOSCOPY             CURRENT MEDICATIONS:    No current facility-administered medications for this encounter.     Current Outpatient Medications   Medication     phenylephrine-cocoa butter (PREPARATION H) 0.25-88.44 % suppository     polyethylene glycol (MIRALAX) 17 GM/Dose powder     albuterol (PROAIR HFA/PROVENTIL HFA/VENTOLIN HFA) 108 (90 Base) MCG/ACT inhaler     amLODIPine (NORVASC) 5 MG tablet     atorvastatin (LIPITOR) 20 MG tablet     benzonatate (TESSALON) 100 MG capsule     canagliflozin (INVOKANA) 300 MG tablet     cloZAPine (CLOZARIL) 100 MG tablet     cloZAPine (CLOZARIL) 50 MG tablet     famotidine (PEPCID) 20 MG tablet     gabapentin (NEURONTIN) 800 MG tablet     gabapentin (NEURONTIN) 800 MG tablet     glipiZIDE (GLUCOTROL XL) 10 MG 24 hr tablet     ibuprofen (ADVIL/MOTRIN) 200 MG tablet     insulin glargine (LANTUS PEN) 100 UNIT/ML pen     lamoTRIgine (LAMICTAL) 150 MG tablet     liraglutide (VICTOZA) 18 MG/3ML solution     losartan (COZAAR) 100 MG tablet     metFORMIN (GLUCOPHAGE) 1000 MG tablet     metoprolol succinate ER (TOPROL-XL) 200 MG 24 hr tablet     omeprazole (PRILOSEC) 40 MG capsule     sertraline (ZOLOFT) 100 MG tablet     sucralfate (CARAFATE) 1 GM tablet      "tiZANidine (ZANAFLEX) 2 MG tablet         ALLERGIES:  No Known Allergies    FAMILY HISTORY:  No family history on file.    SOCIAL HISTORY:   Social History     Socioeconomic History     Marital status: Single   Tobacco Use     Smoking status: Never Smoker     Smokeless tobacco: Never Used   Substance and Sexual Activity     Alcohol use: No     Drug use: No         VITALS:  Patient Vitals for the past 24 hrs:   BP Temp Temp src Pulse Resp SpO2 Height Weight   08/08/22 1033 (!) 162/108 98.1  F (36.7  C) Oral 105 20 94 % 1.93 m (6' 4\") (!) 222.3 kg (490 lb)       PHYSICAL EXAM    Constitutional:  Alert, no distress  EYES: Conjunctivae clear  HENT:  Atraumatic, normocephalic  Respiratory:  No respiratory distress, normal breath sounds  Cardiovascular:  Normal rate, normal rhythm, no murmurs  GI: Morbidly obese abdomen.  No focal tenderness. Palpable internal rectal nodule - tender. Suspect this is an internal hemorrhoid. No blood.  Musculoskeletal: No right knee joint effusion.  Range of motion is intact.  Crepitus with range of motion.  Negative anterior drawer test.  No joint line tenderness.  No significant pain or laxity with varus or valgus stress.  Integument: Warm, Dry  Neurologic:  Alert & oriented x 3  Psych: Affect normal, Mood normal.       LAB:  All pertinent labs reviewed and interpreted.  Labs Ordered and Resulted from Time of ED Arrival to Time of ED Departure - No data to display      RADIOLOGY:  Reviewed all pertinent imaging. Please see official radiology report.  XR Knee Right 1/2 Views   Final Result   IMPRESSION: Anatomic alignment right knee. No acute displaced right knee fracture. Largely preserved joint spaces in the medial and lateral compartments. No appreciable right knee joint effusion. Benign eccentric sclerosis posteromedial distal right    femoral metaphysis. No significant anterior knee soft tissue swelling.                                PROCEDURES:   None      VENICE Molina, " CNP  Emergency Medicine  [facility] EMERGENCY ROOM  [address] [address]  [phone]  Dept: [phone]         Obdulio Duque, VENICE BOLTON  08/08/22 1318

## 2023-03-31 ENCOUNTER — APPOINTMENT (OUTPATIENT)
Dept: RADIOLOGY | Facility: CLINIC | Age: 38
End: 2023-03-31
Attending: EMERGENCY MEDICINE
Payer: MEDICAID

## 2023-03-31 ENCOUNTER — HOSPITAL ENCOUNTER (EMERGENCY)
Facility: CLINIC | Age: 38
Discharge: HOME | End: 2023-03-31
Attending: EMERGENCY MEDICINE | Admitting: EMERGENCY MEDICINE
Payer: MEDICAID

## 2023-03-31 VITALS
HEART RATE: 106 BPM | SYSTOLIC BLOOD PRESSURE: 151 MMHG | DIASTOLIC BLOOD PRESSURE: 83 MMHG | RESPIRATION RATE: 18 BRPM | BODY MASS INDEX: 38.36 KG/M2 | TEMPERATURE: 98.4 F | OXYGEN SATURATION: 96 % | WEIGHT: 315 LBS | HEIGHT: 76 IN

## 2023-03-31 DIAGNOSIS — U07.1 INFECTION DUE TO 2019 NOVEL CORONAVIRUS: ICD-10-CM

## 2023-03-31 LAB
FLUAV RNA SPEC QL NAA+PROBE: NEGATIVE
FLUBV RNA RESP QL NAA+PROBE: NEGATIVE
RSV RNA SPEC NAA+PROBE: NEGATIVE
SARS-COV-2 RNA RESP QL NAA+PROBE: POSITIVE

## 2023-03-31 PROCEDURE — 87637 SARSCOV2&INF A&B&RSV AMP PRB: CPT | Performed by: FAMILY MEDICINE

## 2023-03-31 PROCEDURE — 71046 X-RAY EXAM CHEST 2 VIEWS: CPT

## 2023-03-31 PROCEDURE — 99284 EMERGENCY DEPT VISIT MOD MDM: CPT | Mod: CS,25

## 2023-03-31 PROCEDURE — C9803 HOPD COVID-19 SPEC COLLECT: HCPCS

## 2023-03-31 NOTE — ED NOTES
Primary assessment done by MD in [facility]. MD trinhay with pt discharge at this time. RN discharge pt from [facility].

## 2023-03-31 NOTE — ED TRIAGE NOTES
Pt presents to the ED with c/o worsening body aches, nausea, HA, and sore throat today. Currently denies any SOB or fevers. Tylenol around 1200.

## 2023-03-31 NOTE — Clinical Note
Sunil Rondon was seen and treated in our emergency department on 3/31/2023.  He may return to work on 04/05/2023.       If you have any questions or concerns, please don't hesitate to call.      Jose Herbert MD

## 2023-03-31 NOTE — ED PROVIDER NOTES
Emergency Department Encounter      NAME: Sunil Rondon  AGE: 37 year old male  YOB: 1985  MRN: [id number]  EVALUATION DATE & TIME: No admission date for patient encounter.    PCP: Bertrand Valle    ED PROVIDER: Jose Herbert M.D.      Chief Complaint   Patient presents with     Generalized Body Aches     Cough     Pharyngitis         FINAL IMPRESSION:  1. Infection due to 2019 novel coronavirus        MEDICAL DECISION MAKING:    3:27 PM I met with the patient, obtained history, performed an initial exam, and discussed options and plan for diagnostics and treatment here in the ED.   4:30 PM I updated the patient. We discussed plans for discharge including supportive cares, symptomatic treatment, outpatient follow up, and reasons to return to the emergency department    This patient is a 37-year-old male with a history of schizoaffective disorder, hypertension, obesity and prediabetes who presents with multiple symptoms.  The patient says that he has had a day of worsening myalgias.  He has had nausea, sore throat, nasal congestion and rhinorrhea.  He has had a dry cough.  He says he feels a little shortness of breath.  He does not admit to any infectious contacts.  The patient says that he went through a initial two-step vaccination for COVID but no booster.  The patient's COVID test did come back positive.  Because of his cough and shortness of breath I ordered a chest x-ray to evaluate for pneumonia, pleural effusion or CHF.  The chest x-ray did not show any pneumonia.  I spoke to the patient about the risks and benefits of Paxlovid and he wanted to try it.  The patient's renal function was relatively normal recently.  I reviewed the patient's medical record.  I reviewed and independently interpreted the patient's chest x-ray.    Pertinent Labs & Imaging studies reviewed. (See chart for details)    The importance of close follow up was discussed. We reviewed warning signs and symptoms, and I  instructed Mr. Rondon to return to the emergency department immediately if he develops any new or worsening symptoms. I provided additional verbal discharge instructions. Mr. Rondon expressed understanding and agreement with this plan of care, his questions were answered, and he was discharged in stable condition.     Medical Decision Making    History:    Supplemental history from: Documented in chart, if applicable    External Record(s) reviewed: Documented in chart, if applicable.    Work Up:    Chart documentation includes differential considered and any EKGs or imaging independently interpreted by provider, where specified.    In additional to work up documented, I considered the following work up: Documented in chart, if applicable.    External consultation:    Discussion of management with another provider: Documented in chart, if applicable    Complicating factors:    Care impacted by chronic illness: Diabetes, Hypertension and Mental Health    Care affected by social determinants of health: N/A    Disposition considerations: Discharge. I recommended the patient continue their current prescription strength medication(s): paxlovid. See documentation for any additional details.         MEDICATIONS GIVEN IN THE EMERGENCY:  Medications - No data to display    NEW PRESCRIPTIONS STARTED AT TODAY'S ER VISIT:  Discharge Medication List as of 3/31/2023  4:30 PM      START taking these medications    Details   nirmatrelvir and ritonavir (PAXLOVID) 300 mg/100 mg therapy pack Take 3 tablets by mouth 2 times daily for 5 days, Disp-30 tablet, R-0, Local PrintDate of symptom onset: 3/31/23; Risk criteria met: Yes; Positive Covid-19 test: Yes; Weight >40 kg Yes; Renal fxn: normal;  Drug-Drug interactions reviewed & addressed: Ye s                =================================================================    HPI    Patient information was obtained from: patient    Use of : N/A       Sunil Rondon is a 37  year old male with a past medical history of HTN, morbid obesity, prediabetes, sleep apnea, and schizoaffective disorder who presents to the emergency department via walk-in for evaluation of body aches.    Patient presents with worsening generalized body aches, nausea, and sore throat. Denies shortness of breath, fevers, and any other symptoms.      REVIEW OF SYSTEMS   Review of Systems     PAST MEDICAL HISTORY:  Past Medical History:   Diagnosis Date     Anxiety      Bipolar 1 disorder (H)      Borderline mental retardation      Depression      Diabetes mellitus (H)      Gender identity disorder      GERD (gastroesophageal reflux disease)      Glucose intolerance (impaired glucose tolerance)      Hypertension      Obesity      Panic attacks      Schizoaffective disorder (H)      Sinus tarsi syndrome      Sleep apnea      Suicidal ideation        PAST SURGICAL HISTORY:  Past Surgical History:   Procedure Laterality Date     HC KNEE SCOPE,SINGLE MENISECTOMY Left 6/8/2016    Procedure: LEFT KNEE ARTHROSCOPY, PARTIAL MEDIAL MENISCECTOMY, LOOSE BODY REMOVAL, CHONDROPLASTY;  Surgeon: Sky Goodwin MD;  Location: [facility];  Service: Orthopedics     OPEN REDUCTION INTERNAL FIXATION ANKLE Left 12/31/2018    Procedure: OPEN REDUCTION INTERNAL FIXATION, FRACTURE, LEFT ANKLE;  Surgeon: Jarad Mosqueda MD;  Location: [facility];  Service: Orthopedics     TENDON REPAIR Left     wrist     UPPER GASTROINTESTINAL ENDOSCOPY         CURRENT MEDICATIONS:    No current facility-administered medications for this encounter.    Current Outpatient Medications:      nirmatrelvir and ritonavir (PAXLOVID) 300 mg/100 mg therapy pack, Take 3 tablets by mouth 2 times daily for 5 days, Disp: 30 tablet, Rfl: 0     albuterol (PROAIR HFA/PROVENTIL HFA/VENTOLIN HFA) 108 (90 Base) MCG/ACT inhaler, Inhale 2 puffs into the lungs every 6 hours as needed , Disp: , Rfl:      amLODIPine (NORVASC) 5 MG tablet, [AMLODIPINE (NORVASC) 5 MG  TABLET] Take 1 tablet (5 mg total) by mouth daily., Disp: 30 tablet, Rfl: 0     atorvastatin (LIPITOR) 20 MG tablet, Take 20 mg by mouth At Bedtime, Disp: , Rfl:      benzonatate (TESSALON) 100 MG capsule, Take 1 capsule (100 mg) by mouth 3 times daily as needed for cough, Disp: 30 capsule, Rfl: 0     canagliflozin (INVOKANA) 300 MG tablet, Take 300 mg by mouth every morning (before breakfast), Disp: , Rfl:      cloZAPine (CLOZARIL) 100 MG tablet, [CLOZAPINE (CLOZARIL) 100 MG TABLET] Take 3 tablets (300 mg total) by mouth at bedtime. May use home supply, Disp: 42 tablet, Rfl: 0     cloZAPine (CLOZARIL) 50 MG tablet, Take 50 mg by mouth At Bedtime, Disp: , Rfl:      famotidine (PEPCID) 20 MG tablet, [FAMOTIDINE (PEPCID) 20 MG TABLET] Take 20 mg by mouth 2 (two) times a day., Disp: , Rfl:      gabapentin (NEURONTIN) 800 MG tablet, Take 800 mg by mouth daily as needed (anxiety) , Disp: , Rfl:      gabapentin (NEURONTIN) 800 MG tablet, Take 800 mg by mouth 2 times daily , Disp: , Rfl:      glipiZIDE (GLUCOTROL XL) 10 MG 24 hr tablet, Take 10 mg by mouth daily, Disp: , Rfl:      ibuprofen (ADVIL/MOTRIN) 200 MG tablet, Take 600 mg by mouth every 6 hours as needed for mild pain, Disp: , Rfl:      insulin glargine (LANTUS PEN) 100 UNIT/ML pen, Inject 30 Units Subcutaneous At Bedtime, Disp: , Rfl:      lamoTRIgine (LAMICTAL) 150 MG tablet, [LAMOTRIGINE (LAMICTAL) 150 MG TABLET] Take 300 mg by mouth at bedtime.       , Disp: , Rfl:      liraglutide (VICTOZA) 18 MG/3ML solution, Inject 1.8 mg Subcutaneous daily, Disp: , Rfl:      losartan (COZAAR) 100 MG tablet, [LOSARTAN (COZAAR) 100 MG TABLET] Take 100 mg by mouth daily., Disp: , Rfl:      metFORMIN (GLUCOPHAGE) 1000 MG tablet, Take 1,000 mg by mouth 2 times daily (with meals) , Disp: , Rfl:      metoprolol succinate ER (TOPROL-XL) 200 MG 24 hr tablet, Take 200 mg by mouth daily, Disp: , Rfl:      omeprazole (PRILOSEC) 40 MG capsule, [OMEPRAZOLE (PRILOSEC) 40 MG CAPSULE]  "Take 40 mg by mouth 2 (two) times a day., Disp: , Rfl:      phenylephrine-cocoa butter (PREPARATION H) 0.25-88.44 % suppository, Place 1 suppository rectally as needed for hemorrhoids or itching, Disp: 20 suppository, Rfl: 0     sertraline (ZOLOFT) 100 MG tablet, [SERTRALINE (ZOLOFT) 100 MG TABLET] Take 300 mg by mouth daily., Disp: , Rfl:      sucralfate (CARAFATE) 1 GM tablet, Take 1 g by mouth 4 times daily, Disp: , Rfl:      tiZANidine (ZANAFLEX) 2 MG tablet, Take 2 mg by mouth At Bedtime, Disp: , Rfl:     ALLERGIES:  No Known Allergies    FAMILY HISTORY:  No family history on file.    SOCIAL HISTORY:   Social History     Socioeconomic History     Marital status: Single   Tobacco Use     Smoking status: Never     Smokeless tobacco: Never   Substance and Sexual Activity     Alcohol use: No     Drug use: No       PHYSICAL EXAM:    Vitals: BP (!) 151/83   Pulse 106   Temp 98.4  F (36.9  C) (Oral)   Resp 18   Ht 1.93 m (6' 4\")   Wt (!) 219.1 kg (483 lb)   SpO2 96%   BMI 58.79 kg/m     Constitutional: Well developed, well nourished. Comfortable appearing.  HEAD:Normocephalic, atraumatic,   ENT: mucous membranes moist, nose normal.   Neck- Supple, gross ROM intact.  No JVD.  No palpable nodes.  Pulmonary: Clear to auscultation bilaterally, no respiratory distress, no wheezing, speaks full sentences easily.  Chest: No chest wall tenderness  Cardiovascular: Normal heart rate, regular rhythm, no murmurs. No lower extremity edema, 2+ DP pulses.   Musculoskeletal: Moving all 4 extremities intentionally and without pain. No obvious deformity.  Back: No CVA tenderness  Skin: Warm, dry, no rash.  Neurologic: Alert & oriented x 3, speech clear, moving all extremities spontaneously   Psychiatric: Affect normal, cooperative.     LAB:  All pertinent labs reviewed and interpreted.  Labs Ordered and Resulted from Time of ED Arrival to Time of ED Departure   INFLUENZA A/B, RSV, & SARS-COV2 PCR - Abnormal       Result Value    " Influenza A PCR Negative      Influenza B PCR Negative      RSV PCR Negative      SARS CoV2 PCR Positive (*)        RADIOLOGY:  XR Chest 2 Views   Final Result   IMPRESSION: Lungs are clear. Heart and pulmonary vascularity are normal. No signs of acute disease.              PROCEDURES:   Procedures       I, Kath Moses Cazaresong, am serving as a scribe to document services personally performed by Dr. Jose Herbert based on my observation and the provider's statements to me. I, Jose Herbert M.D. attest that Kath Zeng is acting in a scribe capacity, has observed my performance of the services and has documented them in accordance with my direction.      Jose Herbert M.D.  Emergency Medicine  [facility] EMERGENCY ROOM  [address] [address] [phone]  Dept: [phone]     Jose Herbert MD  04/02/23 1240

## 2023-08-19 ENCOUNTER — HOSPITAL ENCOUNTER (EMERGENCY)
Facility: CLINIC | Age: 38
Discharge: HOME | End: 2023-08-20
Attending: EMERGENCY MEDICINE | Admitting: EMERGENCY MEDICINE
Payer: MEDICAID

## 2023-08-19 DIAGNOSIS — S60.812A ABRASION OF LEFT WRIST, INITIAL ENCOUNTER: ICD-10-CM
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
## 2023-08-19 DIAGNOSIS — T43.212A INTENTIONAL OVERDOSE OF TRAZODONE (H): ICD-10-CM

## 2023-08-19 DIAGNOSIS — T14.91XA SUICIDE ATTEMPT (H): ICD-10-CM

## 2023-08-19 DIAGNOSIS — S61.519A SELF-CUTTING OF WRIST (H): ICD-10-CM

## 2023-08-19 DIAGNOSIS — Z23 NEED FOR TETANUS BOOSTER: ICD-10-CM

## 2023-08-19 PROBLEM — F41.9 ANXIETY AND DEPRESSION: Status: ACTIVE | Noted: 2023-08-19

## 2023-08-19 PROBLEM — D62 ACUTE BLOOD LOSS ANEMIA: Status: ACTIVE | Noted: 2018-05-31

## 2023-08-19 PROBLEM — F32.A ANXIETY AND DEPRESSION: Status: ACTIVE | Noted: 2023-08-19

## 2023-08-19 PROBLEM — F43.25 ADJUSTMENT DISORDER WITH MIXED DISTURBANCE OF EMOTIONS AND CONDUCT: Status: ACTIVE | Noted: 2017-05-31

## 2023-08-19 PROBLEM — D64.9 ANEMIA: Status: ACTIVE | Noted: 2018-03-26

## 2023-08-19 PROBLEM — F43.0 ACUTE REACTION TO STRESS: Status: ACTIVE | Noted: 2023-08-19

## 2023-08-19 PROBLEM — Z02.9 ADMINISTRATIVE ENCOUNTER: Status: ACTIVE | Noted: 2017-05-31

## 2023-08-19 PROCEDURE — 93005 ELECTROCARDIOGRAM TRACING: CPT | Performed by: EMERGENCY MEDICINE

## 2023-08-19 PROCEDURE — 99285 EMERGENCY DEPT VISIT HI MDM: CPT | Mod: 25

## 2023-08-20 VITALS
TEMPERATURE: 98 F | HEIGHT: 76 IN | OXYGEN SATURATION: 98 % | HEART RATE: 73 BPM | SYSTOLIC BLOOD PRESSURE: 121 MMHG | BODY MASS INDEX: 38.36 KG/M2 | DIASTOLIC BLOOD PRESSURE: 82 MMHG | RESPIRATION RATE: 18 BRPM | WEIGHT: 315 LBS

## 2023-08-20 LAB
ALBUMIN SERPL-MCNC: 3.8 G/DL (ref 3.5–5)
ALBUMIN UR-MCNC: 10 MG/DL
ALP SERPL-CCNC: 89 U/L (ref 45–120)
ALT SERPL W P-5'-P-CCNC: 34 U/L (ref 0–45)
AMPHETAMINES UR QL SCN: NORMAL
ANION GAP SERPL CALCULATED.3IONS-SCNC: 10 MMOL/L (ref 5–18)
APAP SERPL-MCNC: <3 UG/ML (ref 10–20)
APPEARANCE UR: CLEAR
AST SERPL W P-5'-P-CCNC: 21 U/L (ref 0–40)
ATRIAL RATE - MUSE: 103 BPM
ATRIAL RATE - MUSE: 110 BPM
ATRIAL RATE - MUSE: 98 BPM
BARBITURATES UR QL: NORMAL
BASOPHILS # BLD AUTO: 0.1 10E3/UL (ref 0–0.2)
BASOPHILS NFR BLD AUTO: 0 %
BENZODIAZ UR QL: NORMAL
BILIRUB DIRECT SERPL-MCNC: 0.1 MG/DL
BILIRUB SERPL-MCNC: 0.2 MG/DL (ref 0–1)
BILIRUB UR QL STRIP: NEGATIVE
BUN SERPL-MCNC: 14 MG/DL (ref 8–22)
CALCIUM SERPL-MCNC: 8.9 MG/DL (ref 8.5–10.5)
CANNABINOIDS UR QL SCN: NORMAL
CHLORIDE BLD-SCNC: 106 MMOL/L (ref 98–107)
CO2 SERPL-SCNC: 27 MMOL/L (ref 22–31)
COCAINE UR QL: NORMAL
COLOR UR AUTO: ABNORMAL
CREAT SERPL-MCNC: 0.75 MG/DL (ref 0.7–1.3)
CREAT UR-MCNC: 112 MG/DL
DIASTOLIC BLOOD PRESSURE - MUSE: 58 MMHG
DIASTOLIC BLOOD PRESSURE - MUSE: 63 MMHG
DIASTOLIC BLOOD PRESSURE - MUSE: NORMAL MMHG
EOSINOPHIL # BLD AUTO: 0.1 10E3/UL (ref 0–0.7)
EOSINOPHIL NFR BLD AUTO: 1 %
ERYTHROCYTE [DISTWIDTH] IN BLOOD BY AUTOMATED COUNT: 19.7 % (ref 10–15)
ETHANOL SERPL-MCNC: <10 MG/DL
GFR SERPL CREATININE-BSD FRML MDRD: >90 ML/MIN/1.73M2
GLUCOSE BLD-MCNC: 89 MG/DL (ref 70–125)
GLUCOSE BLDC GLUCOMTR-MCNC: 134 MG/DL (ref 70–99)
GLUCOSE UR STRIP-MCNC: >1000 MG/DL
HCT VFR BLD AUTO: 40.2 % (ref 40–53)
HGB BLD-MCNC: 12.1 G/DL (ref 13.3–17.7)
HGB UR QL STRIP: NEGATIVE
IMM GRANULOCYTES # BLD: 0.1 10E3/UL
IMM GRANULOCYTES NFR BLD: 1 %
INTERPRETATION ECG - MUSE: NORMAL
KETONES UR STRIP-MCNC: NEGATIVE MG/DL
LEUKOCYTE ESTERASE UR QL STRIP: NEGATIVE
LIPASE SERPL-CCNC: 49 U/L (ref 0–52)
LYMPHOCYTES # BLD AUTO: 1.5 10E3/UL (ref 0.8–5.3)
LYMPHOCYTES NFR BLD AUTO: 12 %
MAGNESIUM SERPL-MCNC: 1.9 MG/DL (ref 1.8–2.6)
MCH RBC QN AUTO: 22.3 PG (ref 26.5–33)
MCHC RBC AUTO-ENTMCNC: 30.1 G/DL (ref 31.5–36.5)
MCV RBC AUTO: 74 FL (ref 78–100)
METHADONE UR QL SCN: NORMAL
MONOCYTES # BLD AUTO: 1.4 10E3/UL (ref 0–1.3)
MONOCYTES NFR BLD AUTO: 11 %
MUCOUS THREADS #/AREA URNS LPF: PRESENT /LPF
NEUTROPHILS # BLD AUTO: 10.1 10E3/UL (ref 1.6–8.3)
NEUTROPHILS NFR BLD AUTO: 75 %
NITRATE UR QL: NEGATIVE
NRBC # BLD AUTO: 0 10E3/UL
NRBC BLD AUTO-RTO: 0 /100
OPIATES UR QL SCN: NORMAL
OXYCODONE UR QL: NORMAL
P AXIS - MUSE: 43 DEGREES
P AXIS - MUSE: 75 DEGREES
P AXIS - MUSE: 78 DEGREES
PCP UR QL SCN: NORMAL
PH UR STRIP: 5.5 [PH] (ref 5–7)
PLATELET # BLD AUTO: 283 10E3/UL (ref 150–450)
POTASSIUM BLD-SCNC: 3.7 MMOL/L (ref 3.5–5)
PR INTERVAL - MUSE: 184 MS
PR INTERVAL - MUSE: 188 MS
PR INTERVAL - MUSE: 190 MS
PROT SERPL-MCNC: 7.5 G/DL (ref 6–8)
QRS DURATION - MUSE: 108 MS
QRS DURATION - MUSE: 110 MS
QRS DURATION - MUSE: 110 MS
QT - MUSE: 360 MS
QT - MUSE: 366 MS
QT - MUSE: 382 MS
QTC - MUSE: 479 MS
QTC - MUSE: 487 MS
QTC - MUSE: 487 MS
R AXIS - MUSE: 102 DEGREES
R AXIS - MUSE: 38 DEGREES
R AXIS - MUSE: 92 DEGREES
RBC # BLD AUTO: 5.43 10E6/UL (ref 4.4–5.9)
RBC URINE: <1 /HPF
SALICYLATES SERPL-MCNC: <8 MG/DL (ref 2–25)
SODIUM SERPL-SCNC: 143 MMOL/L (ref 136–145)
SP GR UR STRIP: 1.03 (ref 1–1.03)
SYSTOLIC BLOOD PRESSURE - MUSE: 110 MMHG
SYSTOLIC BLOOD PRESSURE - MUSE: 122 MMHG
SYSTOLIC BLOOD PRESSURE - MUSE: NORMAL MMHG
T AXIS - MUSE: 13 DEGREES
T AXIS - MUSE: 36 DEGREES
T AXIS - MUSE: 44 DEGREES
TSH SERPL DL<=0.005 MIU/L-ACNC: 1.47 UIU/ML (ref 0.3–5)
UROBILINOGEN UR STRIP-MCNC: <2 MG/DL
VENTRICULAR RATE- MUSE: 103 BPM
VENTRICULAR RATE- MUSE: 110 BPM
VENTRICULAR RATE- MUSE: 98 BPM
WBC # BLD AUTO: 13.3 10E3/UL (ref 4–11)
WBC URINE: <1 /HPF

## 2023-08-20 PROCEDURE — 93005 ELECTROCARDIOGRAM TRACING: CPT | Performed by: EMERGENCY MEDICINE

## 2023-08-20 PROCEDURE — 82248 BILIRUBIN DIRECT: CPT | Performed by: EMERGENCY MEDICINE

## 2023-08-20 PROCEDURE — 90471 IMMUNIZATION ADMIN: CPT | Performed by: EMERGENCY MEDICINE

## 2023-08-20 PROCEDURE — 85025 COMPLETE CBC W/AUTO DIFF WBC: CPT | Performed by: EMERGENCY MEDICINE

## 2023-08-20 PROCEDURE — 250N000009 HC RX 250: Performed by: EMERGENCY MEDICINE

## 2023-08-20 PROCEDURE — 84443 ASSAY THYROID STIM HORMONE: CPT | Performed by: EMERGENCY MEDICINE

## 2023-08-20 PROCEDURE — 83690 ASSAY OF LIPASE: CPT | Performed by: EMERGENCY MEDICINE

## 2023-08-20 PROCEDURE — 82077 ASSAY SPEC XCP UR&BREATH IA: CPT | Performed by: EMERGENCY MEDICINE

## 2023-08-20 PROCEDURE — 36415 COLL VENOUS BLD VENIPUNCTURE: CPT | Performed by: EMERGENCY MEDICINE

## 2023-08-20 PROCEDURE — 250N000013 HC RX MED GY IP 250 OP 250 PS 637: Performed by: EMERGENCY MEDICINE

## 2023-08-20 PROCEDURE — 83735 ASSAY OF MAGNESIUM: CPT | Performed by: EMERGENCY MEDICINE

## 2023-08-20 PROCEDURE — 80053 COMPREHEN METABOLIC PANEL: CPT | Performed by: EMERGENCY MEDICINE

## 2023-08-20 PROCEDURE — 250N000013 HC RX MED GY IP 250 OP 250 PS 637: Performed by: STUDENT IN AN ORGANIZED HEALTH CARE EDUCATION/TRAINING PROGRAM

## 2023-08-20 PROCEDURE — 80179 DRUG ASSAY SALICYLATE: CPT | Performed by: EMERGENCY MEDICINE

## 2023-08-20 PROCEDURE — 90715 TDAP VACCINE 7 YRS/> IM: CPT | Performed by: EMERGENCY MEDICINE

## 2023-08-20 PROCEDURE — 82962 GLUCOSE BLOOD TEST: CPT

## 2023-08-20 PROCEDURE — 80307 DRUG TEST PRSMV CHEM ANLYZR: CPT | Performed by: EMERGENCY MEDICINE

## 2023-08-20 PROCEDURE — 90791 PSYCH DIAGNOSTIC EVALUATION: CPT

## 2023-08-20 PROCEDURE — 250N000011 HC RX IP 250 OP 636: Performed by: EMERGENCY MEDICINE

## 2023-08-20 PROCEDURE — 80143 DRUG ASSAY ACETAMINOPHEN: CPT | Performed by: EMERGENCY MEDICINE

## 2023-08-20 PROCEDURE — 81001 URINALYSIS AUTO W/SCOPE: CPT | Performed by: EMERGENCY MEDICINE

## 2023-08-20 RX ORDER — FAMOTIDINE 20 MG/1
20 TABLET, FILM COATED ORAL 2 TIMES DAILY
Status: DISCONTINUED | OUTPATIENT
Start: 2023-08-20 | End: 2023-08-20 | Stop reason: HOSPADM

## 2023-08-20 RX ORDER — METOPROLOL SUCCINATE 100 MG/1
200 TABLET, EXTENDED RELEASE ORAL DAILY
Status: DISCONTINUED | OUTPATIENT
Start: 2023-08-20 | End: 2023-08-20 | Stop reason: HOSPADM

## 2023-08-20 RX ORDER — ACETAMINOPHEN 325 MG/1
650 TABLET ORAL ONCE
Status: COMPLETED | OUTPATIENT
Start: 2023-08-20 | End: 2023-08-20

## 2023-08-20 RX ORDER — AMLODIPINE BESYLATE 5 MG/1
5 TABLET ORAL DAILY
Status: DISCONTINUED | OUTPATIENT
Start: 2023-08-20 | End: 2023-08-20 | Stop reason: HOSPADM

## 2023-08-20 RX ORDER — LOSARTAN POTASSIUM 25 MG/1
100 TABLET ORAL DAILY
Status: DISCONTINUED | OUTPATIENT
Start: 2023-08-20 | End: 2023-08-20 | Stop reason: HOSPADM

## 2023-08-20 RX ORDER — SUCRALFATE 1 G/1
1 TABLET ORAL 4 TIMES DAILY
Status: DISCONTINUED | OUTPATIENT
Start: 2023-08-20 | End: 2023-08-20 | Stop reason: HOSPADM

## 2023-08-20 RX ORDER — DEXTROSE MONOHYDRATE 25 G/50ML
25-50 INJECTION, SOLUTION INTRAVENOUS
Status: DISCONTINUED | OUTPATIENT
Start: 2023-08-20 | End: 2023-08-20 | Stop reason: HOSPADM

## 2023-08-20 RX ORDER — CLOZAPINE 100 MG/1
300 TABLET ORAL AT BEDTIME
Status: DISCONTINUED | OUTPATIENT
Start: 2023-08-20 | End: 2023-08-20 | Stop reason: HOSPADM

## 2023-08-20 RX ORDER — LIRAGLUTIDE 6 MG/ML
1.8 INJECTION SUBCUTANEOUS DAILY
Status: DISCONTINUED | OUTPATIENT
Start: 2023-08-20 | End: 2023-08-20 | Stop reason: HOSPADM

## 2023-08-20 RX ORDER — NICOTINE POLACRILEX 4 MG
15-30 LOZENGE BUCCAL
Status: DISCONTINUED | OUTPATIENT
Start: 2023-08-20 | End: 2023-08-20 | Stop reason: HOSPADM

## 2023-08-20 RX ORDER — LAMOTRIGINE 150 MG/1
300 TABLET ORAL AT BEDTIME
Status: DISCONTINUED | OUTPATIENT
Start: 2023-08-20 | End: 2023-08-20

## 2023-08-20 RX ORDER — TIZANIDINE 2 MG/1
2 TABLET ORAL AT BEDTIME
Status: DISCONTINUED | OUTPATIENT
Start: 2023-08-20 | End: 2023-08-20 | Stop reason: HOSPADM

## 2023-08-20 RX ORDER — LAMOTRIGINE 100 MG/1
200 TABLET ORAL AT BEDTIME
Status: DISCONTINUED | OUTPATIENT
Start: 2023-08-20 | End: 2023-08-20 | Stop reason: HOSPADM

## 2023-08-20 RX ORDER — SERTRALINE HYDROCHLORIDE 100 MG/1
300 TABLET, FILM COATED ORAL DAILY
Status: DISCONTINUED | OUTPATIENT
Start: 2023-08-20 | End: 2023-08-20 | Stop reason: HOSPADM

## 2023-08-20 RX ORDER — POTASSIUM CHLORIDE 1500 MG/1
40 TABLET, EXTENDED RELEASE ORAL ONCE
Status: COMPLETED | OUTPATIENT
Start: 2023-08-20 | End: 2023-08-20

## 2023-08-20 RX ORDER — HYDROXYZINE HYDROCHLORIDE 25 MG/1
25 TABLET, FILM COATED ORAL EVERY 4 HOURS PRN
Status: DISCONTINUED | OUTPATIENT
Start: 2023-08-20 | End: 2023-08-20 | Stop reason: HOSPADM

## 2023-08-20 RX ORDER — MAGNESIUM OXIDE 400 MG/1
800 TABLET ORAL ONCE
Status: COMPLETED | OUTPATIENT
Start: 2023-08-20 | End: 2023-08-20

## 2023-08-20 RX ORDER — GLIPIZIDE 10 MG/1
10 TABLET, FILM COATED, EXTENDED RELEASE ORAL DAILY
Status: DISCONTINUED | OUTPATIENT
Start: 2023-08-20 | End: 2023-08-20

## 2023-08-20 RX ORDER — GABAPENTIN 400 MG/1
800 CAPSULE ORAL 2 TIMES DAILY
Status: DISCONTINUED | OUTPATIENT
Start: 2023-08-20 | End: 2023-08-20 | Stop reason: HOSPADM

## 2023-08-20 RX ORDER — ATORVASTATIN CALCIUM 10 MG/1
20 TABLET, FILM COATED ORAL AT BEDTIME
Status: DISCONTINUED | OUTPATIENT
Start: 2023-08-20 | End: 2023-08-20 | Stop reason: HOSPADM

## 2023-08-20 RX ORDER — CLOZAPINE 25 MG/1
50 TABLET ORAL AT BEDTIME
Status: DISCONTINUED | OUTPATIENT
Start: 2023-08-20 | End: 2023-08-20 | Stop reason: HOSPADM

## 2023-08-20 RX ORDER — TRAZODONE HYDROCHLORIDE 50 MG/1
100 TABLET, FILM COATED ORAL AT BEDTIME
COMMUNITY

## 2023-08-20 RX ADMIN — Medication 800 MG: at 03:28

## 2023-08-20 RX ADMIN — SUCRALFATE 1 G: 1 TABLET ORAL at 10:13

## 2023-08-20 RX ADMIN — ACETAMINOPHEN 650 MG: 325 TABLET ORAL at 08:32

## 2023-08-20 RX ADMIN — OMEPRAZOLE 40 MG: 20 CAPSULE, DELAYED RELEASE ORAL at 10:09

## 2023-08-20 RX ADMIN — GABAPENTIN 800 MG: 400 CAPSULE ORAL at 10:05

## 2023-08-20 RX ADMIN — METFORMIN HYDROCHLORIDE 1000 MG: 500 TABLET, FILM COATED ORAL at 10:07

## 2023-08-20 RX ADMIN — METOPROLOL SUCCINATE 200 MG: 100 TABLET, EXTENDED RELEASE ORAL at 10:08

## 2023-08-20 RX ADMIN — POTASSIUM CHLORIDE 40 MEQ: 1500 TABLET, EXTENDED RELEASE ORAL at 03:27

## 2023-08-20 RX ADMIN — CLOSTRIDIUM TETANI TOXOID ANTIGEN (FORMALDEHYDE INACTIVATED), CORYNEBACTERIUM DIPHTHERIAE TOXOID ANTIGEN (FORMALDEHYDE INACTIVATED), BORDETELLA PERTUSSIS TOXOID ANTIGEN (GLUTARALDEHYDE INACTIVATED), BORDETELLA PERTUSSIS FILAMENTOUS HEMAGGLUTININ ANTIGEN (FORMALDEHYDE INACTIVATED), BORDETELLA PERTUSSIS PERTACTIN ANTIGEN, AND BORDETELLA PERTUSSIS FIMBRIAE 2/3 ANTIGEN 0.5 ML: 5; 2; 2.5; 5; 3; 5 INJECTION, SUSPENSION INTRAMUSCULAR at 00:41

## 2023-08-20 RX ADMIN — SERTRALINE HYDROCHLORIDE 300 MG: 100 TABLET, FILM COATED ORAL at 10:11

## 2023-08-20 RX ADMIN — LIRAGLUTIDE 1.8 MG: 6 INJECTION SUBCUTANEOUS at 10:22

## 2023-08-20 RX ADMIN — LOSARTAN POTASSIUM 100 MG: 25 TABLET, FILM COATED ORAL at 08:33

## 2023-08-20 RX ADMIN — AMLODIPINE BESYLATE 5 MG: 5 TABLET ORAL at 08:33

## 2023-08-20 RX ADMIN — FAMOTIDINE 20 MG: 20 TABLET ORAL at 10:04

## 2023-08-20 ASSESSMENT — ACTIVITIES OF DAILY LIVING (ADL)
ADLS_ACUITY_SCORE: 35

## 2023-08-20 NOTE — CONSULTS
Diagnostic Evaluation Consultation  Crisis Assessment    Patient Name: Sunil Rondon  Age:  38 year old  Legal Sex: male  Gender Identity: male  Pronouns:   Race: White  Ethnicity: Not  or   Language: English      Patient was assessed: Virtual: fruux Crisis Assessment Start Time: 0840 Crisis Assessment Stop Time: 0940  Patient location: [facility] EMERGENCY ROOM                             [id number]    Referral Data and Chief Complaint  Sunil Rondon presents to the ED per community partner(s) (Group home staff drove patient to ED per his request). Patient is presenting to the ED for the following concerns: Anxiety, Suicide attempt, Suicidal ideation, Depression.   Factors that make the mental health crisis life threatening or complex are:  The patient has a cognitive delay an Schizoaffective Disorder. He resides in a group home independently with 1 roommate..      Informed Consent and Assessment Methods  Explained the crisis assessment process, including applicable information disclosures and limits to confidentiality, assessed understanding of the process, and obtained consent to proceed with the assessment.  Assessment methods included conducting a formal interview with patient, review of medical records, collaboration with medical staff, and obtaining relevant collateral information from family and community providers when available.  : done     Patient response to interventions: eager to participate, verbalizes understanding  Coping skills were attempted to reduce the crisis:  Listen to music, play piano, play video games, watch television, play on computer, go for a walk     History of the Crisis   The patient has a hx of cognitive delay and Schizoaffective Disorder. Per chart review he had a suicide attempt in March of 2020 via overdose. His last inpatient admission was [facility] in 9/2011.    Brief Psychosocial History  Family:  Single, Children no  Support  System:  Parent(s)  Employment Status:  disabled  Source of Income:  disability  Financial Environmental Concerns:  No concerns identified  Current Hobbies:  family functions, outdoor activities, music, television/movies/videos  Barriers in Personal Life:  emotional concerns, mental health concerns    Significant Clinical History  Current Anxiety Symptoms:  anxious, racing thoughts  Current Depression/Trauma:  low self esteem, impaired decision making  Current Somatic Symptoms:  anxious  Current Psychosis/Thought Disturbance:     Current Eating Symptoms:     Chemical Use History:  Alcohol: None  Benzodiazepines: None  Opiates: None  Cocaine: None  Marijuana: None  Other Use: None   Past diagnosis:  Anxiety Disorder, Other (Shizoaffective Disorder and Cognitive Delay)  Family history:  Schizophrenia  Past treatment:  Individual therapy, Partial Hospitalization, Psychiatric Medication Management, Inpatient Hospitalization, Supportive Living Environment (group home, alf house, etc)  Details of most recent treatment:  The patient reports living in a supported group home. He previously attended [facility] but does not recall date/location. The patient has an established individual therapist he meets with weekly and a psychiatrist he meets with every 6 weeks.  Other relevant history:          Collateral Information  Is there collateral information: Yes     Collateral information name, relationship, phone number:  Father, Andrew Rondon, [phone]    What happened today: Unsure, he is traveling.     What is different about patient's functioning: Patient at baseline is very emotional, has difficulty handling emotions at base line.     Concern about alcohol/drug use:      What do you think the patient needs:      Has patient made comments about wanting to kill themselves/others: yes (Can make comments out of frustration when having difficulty handling emotions.)    If d/c is recommended, can they take part in safety/aftercare  "planning:  no (Out of town for a week)    Additional collateral information:  Group home has staff but they are minimally engaged. Mostly there for an emergency and a call for medication reminders. Has a hx of irrational decision making. Does not think admissions are helpful, or have not been in the past. He often has low self esteem and over-reacts, does not believe he is actually suicidal. (Had suicide attempt several years ago. Nothing recent that he knows of. At 18 put a rope around his neck, did not act on thoughts.)     Risk Assessment  [place] Suicide Severity Rating Scale Full Clinical Version:  Suicidal Ideation  Q6 Suicide Behavior (Lifetime): yes          [place] Suicide Severity Rating Scale Recent:   Suicidal Ideation (Recent)  Q1 Wished to be Dead (Past Month):  (Patient has thoughts of \"wishing to be dead\" at times. This appears to be his baseline per his father, patient does not handle conflict well)  Q2 Suicidal Thoughts (Past Month): yes  Q3 Suicidal Thought Method: yes  Q4 Suicidal Intent without Specific Plan: no (Reports taking the Trazodone last night was not planned. He states it was a mistake and he does not want to die.)  Q5 Suicide Intent with Specific Plan: no  Within the Past 3 Months?: no  Level of Risk per Screen: high risk  Intensity of Ideation (Recent)  Most Severe Ideation Rating (Past 1 Month): 3  Description of Most Severe Ideation (Past 1 Month): Patient reports passive S/I over the past month almost daily. He denies intent or plan at this time. He currently denies S/I noting he doesn't want to do that to his family and his nieces.  Frequency (Past 1 Month): Daily or almost daily  Duration (Past 1 Month): Fleeting, few seconds or minutes  Deterrents (Past 1 Month): Deterrents definitely stopped you from attempting suicide  Reasons for Ideation (Past 1 Month): Mostly to end or stop the pain (You couldn't go on living with the pain or how you were feeling)  Suicidal Behavior " "(Recent)  Actual Attempt (Past 3 Months): Yes (Last night attempted to cut self with butter knife then \"overdosed\" on Trazodone.)  Total Number of Actual Attempts (Past 3 Months): 1  Actual Attempt Description (Past 3 Months): 1- last night  Has subject engaged in non-suicidal self-injurious behavior? (Past 3 Months): Yes (hx of cutting)  Interrupted Attempts (Past 3 Months): No  Total Number of Interrupted Attempts (Past 3 Months): 0  Interrupted Attempt Description (Past 3 Months): N/A  Aborted or Self-Interrupted Attempt (Past 3 Months): No  Total Number of Aborted or Self-Interrupted Attempts (Past 3 Months): 0  Aborted or Self-Interrupted Attempt Description (Past 3 Months): N/A  Preparatory Acts or Behavior (Past 3 Months): No  Total Number of Preparatory Acts (Past 3 Months): 0  Preparatory Acts or Behavior Description (Past 3 Months): N/A    Environmental or Psychosocial Events: helplessness/hopelessness, neither working nor attending school  Protective Factors: Protective Factors: strong bond to family unit, community support, or employment, lives in a responsibly safe and stable environment, good treatment engagement, sense of importance of health and wellness, able to access care without barriers, supportive ongoing medical and mental health care relationships, help seeking    Does the patient have thoughts of harming others? Feels Like Hurting Others: no  Previous Attempt to Hurt Others: no  Is the patient engaging in sexually inappropriate behavior?: no    Is the patient engaging in sexually inappropriate behavior?  no        Mental Status Exam   Affect: Appropriate  Appearance: Appropriate  Attention Span/Concentration: Attentive  Eye Contact: Engaged    Fund of Knowledge: Delayed, Appropriate   Language /Speech Content: Fluent  Language /Speech Volume: Normal  Language /Speech Rate/Productions: Normal  Recent Memory: Intact, Poor, Other (please comment), Variable (Patient has cognitive delay.)  Remote " Memory: Variable  Mood: Anxious, Depressed  Orientation to Person: Yes   Orientation to Place: Yes  Orientation to Time of Day: Yes  Orientation to Date: Yes     Situation (Do they understand why they are here?): Yes  Psychomotor Behavior: Normal  Thought Content: Clear  Thought Form: Intact     Mini-Cog Assessment  Number of Words Recalled:    Clock-Drawing Test:     Three Item Recall:    Mini-Cog Total Score:       Medication  Psychotropic medications:   Medication Orders - Psychiatric (From admission, onward)      Start     Dose/Rate Route Frequency Ordered Stop    08/20/23 2200  cloZAPine (CLOZARIL) tablet 300 mg         300 mg Oral AT BEDTIME 08/20/23 0622 08/20/23 2200  cloZAPine (CLOZARIL) tablet 50 mg         50 mg Oral AT BEDTIME 08/20/23 0622 08/20/23 0900  sertraline (ZOLOFT) tablet 300 mg         300 mg Oral DAILY 08/20/23 0622      08/20/23 0620  OLANZapine zydis (zyPREXA) ODT half-tab 10 mg         10 mg Oral 2 TIMES DAILY PRN 08/20/23 0620      08/20/23 0620  hydrOXYzine (ATARAX) tablet 25 mg         25 mg Oral EVERY 4 HOURS PRN 08/20/23 0620               Current Care Team  Patient Care Team:  Bertrand Valle as PCP - General (Family Practice)    Diagnosis  Patient Active Problem List   Diagnosis Code    Gender identity disorder in adolescents or adults F64.0    Psychosis (H) F29    Mental retardation F79    Schizoaffective disorder (H) F25.9    Generalized anxiety disorder F41.1    Morbid obesity (H) E66.01    HTN (hypertension) I10    BETHEL (obstructive sleep apnea) G47.33    Esophageal reflux K21.9    Community acquired pneumonia of left lower lobe of lung J18.9    Adjustment disorder with mixed disturbance of emotions and conduct F43.25    Administrative encounter Z02.9    Anemia D64.9    Ankle pain M25.579    Acute blood loss anemia D62    Acute reaction to stress F43.0    Anxiety and depression F41.9, F32.A    Depression, major F32.9       Primary Problem This Admission  Active  "Hospital Problems    Generalized anxiety disorder      Mental retardation        Clinical Summary and Substantiation of Recommendations   The patient presents to the ED after a verbal altercation with roommate last night which made him feel \"anxious and overwhelmed.\" Patient states he tried to cut himself with a knife but was unsuccessful so he then took Trazodone as an attempt to overdose. He immediately was concerned with his decision and told his roommate then called group home staff to be transported to the hospital for evaluation. Patient states \"I made a mistake. I don't want to die.\" Patient is future focused and states he has three appointments this coming week he does not want to miss. He states he does not want to end his life due to his family and being close to his nieces. The patient does not appear to have taken a substantial amount of Trazodone and did not require medical intervention. The paitent reports feeling safe returning to his group home. He denies H/I, psychosis or access to weapons. He plans to follow up with current providers.                          Patient coping skills attempted to reduce the crisis:  Listen to music, play piano, play video games, watch television, play on computer, go for a walk    Disposition  Recommended disposition: Individual Therapy, Medication Management, Group Home        Reviewed case and recommendations with attending provider. Attending Name: Dr. Sterling       Attending concurs with disposition: yes       Patient and/or validated legal guardian concurs with disposition:   yes       Final disposition:  discharge    Legal status on admission:      Assessment Details   Total duration spent on the patient case in minutes: 60 min     CPT code(s) utilized: 54875 - Psychotherapy for Crisis - 60 (30-74*) min    FREEMAN Lombardi, Psychotherapist  [facility] - Triage & Transition Services  Callback: [phone]  8/20/2023  10:23 AM           "

## 2023-08-20 NOTE — ED TRIAGE NOTES
"Pt presents to ED with reports of getting into an argument with his roommate tonight and \"snapped\".  Pt reports trying to cut his wrist with a butter knife.  Pt then reports he took an almost full bottle of 50 mg trazadone around 2313 tonight.  Pt here with staff member from his home.     Triage Assessment       Row Name 08/19/23 0829       Triage Assessment (Adult)    Airway WDL WDL       Respiratory WDL    Respiratory WDL X;rhythm/pattern    Rhythm/Pattern, Respiratory other (see comments)  tightness in chest       Skin Circulation/Temperature WDL    Skin Circulation/Temperature WDL WDL       Cardiac WDL    Cardiac WDL X;chest pain       Chest Pain Assessment    Chest Pain Location midsternal    Character tightness       Peripheral/Neurovascular WDL    Peripheral Neurovascular WDL WDL       Cognitive/Neuro/Behavioral WDL    Cognitive/Neuro/Behavioral WDL WDL                    "

## 2023-08-20 NOTE — ED PROVIDER NOTES
EMERGENCY DEPARTMENT ENCOUNTER      NAME: Sunil Rondon  AGE: 38 year old male  YOB: 1985  MRN: [id number]  EVALUATION DATE & TIME: 8/19/2023 11:50 PM    PCP: Bertrand Valle    ED PROVIDER: Chato Alanis M.D.      Chief Complaint   Patient presents with    Drug Overdose         IMPRESSION  1. Suicide attempt (H)    2. Intentional overdose of trazodone (H)    3. Abrasion of left wrist, initial encounter    4. Self-cutting of wrist (H)    5. Need for tetanus booster        PLAN  - follow up DEC assessment (scheduled for 10am); likely inpatient psych admit    ED COURSE & MEDICAL DECISION MAKING    ED Course as of 08/20/23 0714   Sat Aug 19, 2023   2354 38yoM with history of mild metal retardation (lives independently in an apartment with a roommate), schizoaffective disorder, obesity (483lbs), T2DM (takes insulin), HTN, GERD, prior suicidal ideation & self-injurious behavior presenting from home with his 24-hour on-call emergency staff (on-call for people with disabilities) for evaluation of suicide attempt & trazodone overdose. Patient reports he got in an argument with his roommate today and was frustrated; then wanted to kill himself to tried cutting himself with a butter knife. Unable to cut his left wrist with this (couple small scratches) so then took his prescribed trazodone to try and kill himself. Took the trazodone pills at 11:15pm tonight. Has trazodone 50mg tablets #90 prescribed from 7/27/23; prescribed one tablets 3 times daily as needed for anxiety. Patient states he takes these usually every day. States the bottle was nearly full, but with this fill date and administration, would nearly be empty---unclear exactly how may tablets he took. Called his emergency staff who then brought patient to the ED. Here now in the ED, patient states he physically feels well----denies any pains, difficulty breathing, nausea, vomiting, numbness, tingling, focal weakness. States he is sorry about what  "he did. Denies active SI, hallucinations, homicidal ideation.    BP 150s/80s on presentation with HR 110s and otherwise normal vitals. Medical exam with clear mentation, nonfocal neuro exam, clear lungs, normal work of breathing, benign abdomen, several very superficial linear horizontal abrasions to flexor left wrist with no laceration or tenderness with distal CMS intact. Psych exam with flat affect, no suggestion of luci or psychosis, no active SI or HI at this time.    From medical standpoint, need ingestion workup and likely observation period. No somnolence on exam or hypotension. Triage EKG unchanged from prior with unchanged intervals.   2354 Once medically cleared, will certainly need DEC evaluation. Suspect warrants     Sun Aug 20, 2023   0014 I discussed with poison center who recommends 4-6 hours observation period; recommends K>4 & Mg>2. States patient would be medically cleared at 6 hours post ingestion (would be 5:15am today).   0146 Once medically cleared, will certainly need DEC evaluation. Suspect warrants inpatient psych admission given suicide attempt.    Will update tetanus given abrasions while obtaining workup. Patient comfortable with this plan; no further questions at this time.   0313 Blood/urine testing reassuring with negative BAL, negative Aspirin/Tylenol levels, unremarkable LFTs, no Klaus, no glaring electrolyte abnormality, hemoglobin 12, WBC 13, no UTI, UDS negative.   0524 Patient asymptomatic on recheck with unremarkable physical exam; medically cleared at this time.    When asked if he is suicidal at this time he states \"I don't know\". Will have DEC evaluate. Certainly holdable in the meantime and suspect he warrants inpatient psych placement.   0714 Patient signed out to Dr. Sterling at routine shift change. Plan at this time is follow up DEC assessment (scheduled for 10am); likely inpatient psych admit. Home meds ordered. Patient calm & cooperative while here in the ED.   "       --------------------------------------------------------------------------------   --------------------------------------------------------------------------------     12:03 AM I met with the patient for the initial interview and physical examination. Discussed plan for treatment and workup in the ED.      This patient involved a high degree of complexity in medical decision making, as significant risks were present and assessed. Recent encounters & results in medical record reviewed by me.    All workup (i.e. any EKG/labs/imaging as per charting below) reviewed and independently interpreted by me. See respective sections for details.        See additional MDM below if interested.      MEDICATIONS GIVEN IN THE EMERGENCY DEPARTMENT  Medications   hydrOXYzine (ATARAX) tablet 25 mg (has no administration in time range)   OLANZapine zydis (zyPREXA) ODT half-tab 10 mg (has no administration in time range)   melatonin tablet 3 mg (has no administration in time range)   amLODIPine (NORVASC) tablet 5 mg (has no administration in time range)   atorvastatin (LIPITOR) tablet 20 mg (has no administration in time range)   empagliflozin (JARDIANCE) tablet 25 mg (has no administration in time range)   cloZAPine (CLOZARIL) tablet 300 mg (has no administration in time range)   cloZAPine (CLOZARIL) tablet 50 mg (has no administration in time range)   famotidine (PEPCID) tablet 20 mg (has no administration in time range)   gabapentin (NEURONTIN) tablet 800 mg (has no administration in time range)   glipiZIDE (GLUCOTROL XL) 24 hr tablet 10 mg (has no administration in time range)   insulin glargine (LANTUS PEN) injection 30 Units (has no administration in time range)   lamoTRIgine (LaMICtal) tablet 300 mg (has no administration in time range)   liraglutide (VICTOZA) injection 1.8 mg (has no administration in time range)   losartan (COZAAR) tablet 100 mg (has no administration in time range)   metFORMIN (GLUCOPHAGE) tablet 1,000  "mg (has no administration in time range)   metoprolol succinate ER (TOPROL XL) 24 hr tablet 200 mg (has no administration in time range)   omeprazole (PriLOSEC) CR capsule 40 mg (has no administration in time range)   sertraline (ZOLOFT) tablet 300 mg (has no administration in time range)   sucralfate (CARAFATE) tablet 1 g (has no administration in time range)   tiZANidine (ZANAFLEX) tablet 2 mg (has no administration in time range)   Tdap (tetanus-diphtheria-acell pertussis) (ADACEL) injection 0.5 mL (0.5 mLs Intramuscular $Given 8/20/23 0041)   magnesium oxide (MAG-OX) tablet 800 mg (800 mg Oral $Given 8/20/23 0328)   potassium chloride ER (KLOR-CON M) CR tablet 40 mEq (40 mEq Oral $Given 8/20/23 0327)                 =================================================================      HPI  Patient information was obtained from: Patient     Use of : N/A       Sunil Rondon is a 38 year old male with a pertinent history of suicide attempt, developmental delay, bipolar disorder, schizoaffective disorder, DM type II, HTN, and morbid obesity who presents to this ED via walk-in for evaluation of drug ingestion    The patient reports earlier today they got into \"arguments\" with their roommate, became \"emotional\", and \"snapped\", taking almost a full bottle of their prescribed trazodone around 11 PM tonight (about 1.5 hours ago). The patient notes they take trazodone \"three times a day\" and took some today prior to the overdose. The patient also reports attempting to cut their left wrist \"with a butter knife\".   Right now, the patient reports \"a little bit\" of shortness of breath and feeling \"a little tired\". The patient denies nausea.    The patient reports they have been \"having a hard time\" with suicidal ideation and depression lately.    --------------- MEDICAL HISTORY ---------------  PAST MEDICAL HISTORY:  Reviewed independently by me.  Past Medical History:   Diagnosis Date    Anxiety     Bipolar 1 " disorder (H)     Borderline mental retardation     Depression     Diabetes mellitus (H)     Gender identity disorder     GERD (gastroesophageal reflux disease)     Glucose intolerance (impaired glucose tolerance)     Hypertension     Obesity     Panic attacks     Schizoaffective disorder (H)     Sinus tarsi syndrome     Sleep apnea     Suicidal ideation      Patient Active Problem List   Diagnosis    Gender identity disorder in adolescents or adults    Psychosis (H)    Mental retardation    Schizoaffective disorder (H)    Anxiety state    Morbid obesity (H)    HTN (hypertension)    BETHEL (obstructive sleep apnea)    Esophageal reflux    Community acquired pneumonia of left lower lobe of lung    Adjustment disorder with mixed disturbance of emotions and conduct    Administrative encounter    Anemia    Ankle pain    Acute blood loss anemia    Acute reaction to stress    Anxiety and depression    Depression, major       PAST SURGICAL HISTORY:  Reviewed independently by me.  Past Surgical History:   Procedure Laterality Date    HC KNEE SCOPE,SINGLE MENISECTOMY Left 6/8/2016    Procedure: LEFT KNEE ARTHROSCOPY, PARTIAL MEDIAL MENISCECTOMY, LOOSE BODY REMOVAL, CHONDROPLASTY;  Surgeon: Sky Goodwin MD;  Location: [facility];  Service: Orthopedics    OPEN REDUCTION INTERNAL FIXATION ANKLE Left 12/31/2018    Procedure: OPEN REDUCTION INTERNAL FIXATION, FRACTURE, LEFT ANKLE;  Surgeon: Jarad Mosqueda MD;  Location: [facility];  Service: Orthopedics    TENDON REPAIR Left     wrist    UPPER GASTROINTESTINAL ENDOSCOPY         CURRENT MEDICATIONS:    Reviewed independently by me.    Current Facility-Administered Medications:     amLODIPine (NORVASC) tablet 5 mg, 5 mg, Oral, Daily, Chato Alanis MD    atorvastatin (LIPITOR) tablet 20 mg, 20 mg, Oral, At Bedtime, Chato Alanis MD    cloZAPine (CLOZARIL) tablet 300 mg, 300 mg, Oral, At Bedtime, Chato Alanis MD    cloZAPine (CLOZARIL) tablet 50 mg,  50 mg, Oral, At Bedtime, Chato Alanis MD    empagliflozin (JARDIANCE) tablet 25 mg, 25 mg, Oral, Daily, Chato Alanis MD    famotidine (PEPCID) tablet 20 mg, 20 mg, Oral, BID, Chato Alanis MD    gabapentin (NEURONTIN) tablet 800 mg, 800 mg, Oral, BID, Chato Alanis MD    glipiZIDE (GLUCOTROL XL) 24 hr tablet 10 mg, 10 mg, Oral, Daily, Chato Alanis MD    hydrOXYzine (ATARAX) tablet 25 mg, 25 mg, Oral, Q4H PRN, Chato Alanis MD    insulin glargine (LANTUS PEN) injection 30 Units, 30 Units, Subcutaneous, At Bedtime, Chato Alanis MD    lamoTRIgine (LaMICtal) tablet 300 mg, 300 mg, Oral, At Bedtime, Chato Alanis MD    liraglutide (VICTOZA) injection 1.8 mg, 1.8 mg, Subcutaneous, Daily, Chato Alanis MD    losartan (COZAAR) tablet 100 mg, 100 mg, Oral, Daily, Chato Alanis MD    melatonin tablet 3 mg, 3 mg, Oral, At Bedtime PRN, Chato Alanis MD    metFORMIN (GLUCOPHAGE) tablet 1,000 mg, 1,000 mg, Oral, BID w/meals, Chato Alanis MD    metoprolol succinate ER (TOPROL XL) 24 hr tablet 200 mg, 200 mg, Oral, Daily, Chato Alanis MD    OLANZapine zydis (zyPREXA) ODT half-tab 10 mg, 10 mg, Oral, BID PRN, Chato Alanis MD    omeprazole (PriLOSEC) CR capsule 40 mg, 40 mg, Oral, BID, Chato Alanis MD    sertraline (ZOLOFT) tablet 300 mg, 300 mg, Oral, Daily, Chato Alanis MD    sucralfate (CARAFATE) tablet 1 g, 1 g, Oral, 4x Daily, Chato Alanis MD    tiZANidine (ZANAFLEX) tablet 2 mg, 2 mg, Oral, At Bedtime, Chato Alanis MD    Current Outpatient Medications:     albuterol (PROAIR HFA/PROVENTIL HFA/VENTOLIN HFA) 108 (90 Base) MCG/ACT inhaler, Inhale 2 puffs into the lungs every 6 hours as needed , Disp: , Rfl:     amLODIPine (NORVASC) 5 MG tablet, [AMLODIPINE (NORVASC) 5 MG TABLET] Take 1 tablet (5 mg total) by mouth daily., Disp: 30 tablet, Rfl: 0    atorvastatin (LIPITOR) 20 MG tablet, Take 20 mg by mouth At Bedtime, Disp: , Rfl:      benzonatate (TESSALON) 100 MG capsule, Take 1 capsule (100 mg) by mouth 3 times daily as needed for cough, Disp: 30 capsule, Rfl: 0    canagliflozin (INVOKANA) 300 MG tablet, Take 300 mg by mouth every morning (before breakfast), Disp: , Rfl:     cloZAPine (CLOZARIL) 100 MG tablet, [CLOZAPINE (CLOZARIL) 100 MG TABLET] Take 3 tablets (300 mg total) by mouth at bedtime. May use home supply, Disp: 42 tablet, Rfl: 0    cloZAPine (CLOZARIL) 50 MG tablet, Take 50 mg by mouth At Bedtime, Disp: , Rfl:     famotidine (PEPCID) 20 MG tablet, [FAMOTIDINE (PEPCID) 20 MG TABLET] Take 20 mg by mouth 2 (two) times a day., Disp: , Rfl:     gabapentin (NEURONTIN) 800 MG tablet, Take 800 mg by mouth daily as needed (anxiety) , Disp: , Rfl:     gabapentin (NEURONTIN) 800 MG tablet, Take 800 mg by mouth 2 times daily , Disp: , Rfl:     glipiZIDE (GLUCOTROL XL) 10 MG 24 hr tablet, Take 10 mg by mouth daily, Disp: , Rfl:     ibuprofen (ADVIL/MOTRIN) 200 MG tablet, Take 600 mg by mouth every 6 hours as needed for mild pain, Disp: , Rfl:     insulin glargine (LANTUS PEN) 100 UNIT/ML pen, Inject 30 Units Subcutaneous At Bedtime, Disp: , Rfl:     lamoTRIgine (LAMICTAL) 150 MG tablet, [LAMOTRIGINE (LAMICTAL) 150 MG TABLET] Take 300 mg by mouth at bedtime.       , Disp: , Rfl:     liraglutide (VICTOZA) 18 MG/3ML solution, Inject 1.8 mg Subcutaneous daily, Disp: , Rfl:     losartan (COZAAR) 100 MG tablet, [LOSARTAN (COZAAR) 100 MG TABLET] Take 100 mg by mouth daily., Disp: , Rfl:     metFORMIN (GLUCOPHAGE) 1000 MG tablet, Take 1,000 mg by mouth 2 times daily (with meals) , Disp: , Rfl:     metoprolol succinate ER (TOPROL-XL) 200 MG 24 hr tablet, Take 200 mg by mouth daily, Disp: , Rfl:     omeprazole (PRILOSEC) 40 MG capsule, [OMEPRAZOLE (PRILOSEC) 40 MG CAPSULE] Take 40 mg by mouth 2 (two) times a day., Disp: , Rfl:     phenylephrine-cocoa butter (PREPARATION H) 0.25-88.44 % suppository, Place 1 suppository rectally as needed for hemorrhoids or  itching, Disp: 20 suppository, Rfl: 0    sertraline (ZOLOFT) 100 MG tablet, [SERTRALINE (ZOLOFT) 100 MG TABLET] Take 300 mg by mouth daily., Disp: , Rfl:     sucralfate (CARAFATE) 1 GM tablet, Take 1 g by mouth 4 times daily, Disp: , Rfl:     tiZANidine (ZANAFLEX) 2 MG tablet, Take 2 mg by mouth At Bedtime, Disp: , Rfl:     ALLERGIES:  Reviewed independently by me.  No Known Allergies    FAMILY HISTORY:  Reviewed independently by me.  History reviewed. No pertinent family history.    SOCIAL HISTORY:   Reviewed independently by me.  Social History     Socioeconomic History    Marital status: Single   Tobacco Use    Smoking status: Never    Smokeless tobacco: Never   Substance and Sexual Activity    Alcohol use: No    Drug use: No       --------------- PHYSICAL EXAM ---------------  Nursing notes and vitals independently reviewed by me.  VITALS:  Vitals:    08/20/23 0230 08/20/23 0558 08/20/23 0613 08/20/23 0628   BP: 115/57 124/60 109/56 114/59   Pulse: 92 97 91 94   Resp: 14 20 17 18   Temp:       TempSrc:       SpO2: 93% 95% 97% 95%   Weight:       Height:           PHYSICAL EXAM:    General:  alert, interactive, no distress  Eyes:  conjunctivae clear, conjugate gaze. PERRL at 2mm, EOMI  HENT:  atraumatic, nose with no rhinorrhea, oropharynx clear  Neck:  no meningismus  Cardiovascular:  HR 110s during exam, regular rhythm, no murmurs, brisk cap refill  Chest:  no chest wall tenderness  Pulmonary:  no stridor, normal phonation, normal work of breathing, clear lungs bilaterally  Abdomen:  soft, nondistended, nontender  :  no CVA tenderness  Back:  no midline spinal tenderness  Musculoskeletal:  no pretibial edema, no calf tenderness. Gross ROM intact to joints of extremities with no obvious deformities.  Skin:  warm, dry, no rash  Neuro:  awake, alert, answers questions appropriately, follows commands, moves all limbs  Psych:  flat affect       --------------- RESULTS ---------------  EKG #1 (obtained at 23:57):     Reviewed and independently interpreted by me.  - sinus tachycardia 110bpm, no ST or T wave changes, ,  (prior 108), , QTc 487 (prior 473)  - unchanged from prior on 11/31/21  My read.    EKG #2 (obtained at 01:33):    Reviewed and independently interpreted by me.  - NSR at 98bpm, no ST or T wave changes, , , , QTc 487  - decreased rate with otherwise no changes from earlier tonight  My read.    LAB:  Reviewed and independently interpreted by me.  Results for orders placed or performed during the hospital encounter of 08/19/23   Basic metabolic panel   Result Value Ref Range    Sodium 143 136 - 145 mmol/L    Potassium 3.7 3.5 - 5.0 mmol/L    Chloride 106 98 - 107 mmol/L    Carbon Dioxide (CO2) 27 22 - 31 mmol/L    Anion Gap 10 5 - 18 mmol/L    Urea Nitrogen 14 8 - 22 mg/dL    Creatinine 0.75 0.70 - 1.30 mg/dL    Calcium 8.9 8.5 - 10.5 mg/dL    Glucose 89 70 - 125 mg/dL    GFR Estimate >90 >60 mL/min/1.73m2   Hepatic function panel   Result Value Ref Range    Bilirubin Total 0.2 0.0 - 1.0 mg/dL    Bilirubin Direct 0.1 <=0.5 mg/dL    Protein Total 7.5 6.0 - 8.0 g/dL    Albumin 3.8 3.5 - 5.0 g/dL    Alkaline Phosphatase 89 45 - 120 U/L    AST 21 0 - 40 U/L    ALT 34 0 - 45 U/L   Result Value Ref Range    Lipase 49 0 - 52 U/L   Result Value Ref Range    Magnesium 1.9 1.8 - 2.6 mg/dL   TSH with free T4 reflex   Result Value Ref Range    TSH 1.47 0.30 - 5.00 uIU/mL   Alcohol level blood   Result Value Ref Range    Alcohol, Blood <10 None detected mg/dL   Result Value Ref Range    Salicylate <8 2 - 25 mg/dL   Acetaminophen level   Result Value Ref Range    Acetaminophen <3.0 (L) 10.0 - 20.0 ug/mL   UA with Microscopic reflex to Culture    Specimen: Urine, NOS   Result Value Ref Range    Color Urine Light Yellow Colorless, Straw, Light Yellow, Yellow    Appearance Urine Clear Clear    Glucose Urine >1000 (A) Negative mg/dL    Bilirubin Urine Negative Negative    Ketones Urine Negative  Negative mg/dL    Specific Gravity Urine 1.028 1.001 - 1.030    Blood Urine Negative Negative    pH Urine 5.5 5.0 - 7.0    Protein Albumin Urine 10 (A) Negative mg/dL    Urobilinogen Urine <2.0 <2.0 mg/dL    Nitrite Urine Negative Negative    Leukocyte Esterase Urine Negative Negative    Mucus Urine Present (A) None Seen /LPF    RBC Urine <1 <=2 /HPF    WBC Urine <1 <=5 /HPF   CBC with platelets and differential   Result Value Ref Range    WBC Count 13.3 (H) 4.0 - 11.0 10e3/uL    RBC Count 5.43 4.40 - 5.90 10e6/uL    Hemoglobin 12.1 (L) 13.3 - 17.7 g/dL    Hematocrit 40.2 40.0 - 53.0 %    MCV 74 (L) 78 - 100 fL    MCH 22.3 (L) 26.5 - 33.0 pg    MCHC 30.1 (L) 31.5 - 36.5 g/dL    RDW 19.7 (H) 10.0 - 15.0 %    Platelet Count 283 150 - 450 10e3/uL    % Neutrophils 75 %    % Lymphocytes 12 %    % Monocytes 11 %    % Eosinophils 1 %    % Basophils 0 %    % Immature Granulocytes 1 %    NRBCs per 100 WBC 0 <1 /100    Absolute Neutrophils 10.1 (H) 1.6 - 8.3 10e3/uL    Absolute Lymphocytes 1.5 0.8 - 5.3 10e3/uL    Absolute Monocytes 1.4 (H) 0.0 - 1.3 10e3/uL    Absolute Eosinophils 0.1 0.0 - 0.7 10e3/uL    Absolute Basophils 0.1 0.0 - 0.2 10e3/uL    Absolute Immature Granulocytes 0.1 <=0.4 10e3/uL    Absolute NRBCs 0.0 10e3/uL   Drugs of Abuse 1+ Panel, Urine ([facility] Only)   Result Value Ref Range    Amphetamines Urine Screen Negative Screen Negative    Benzodiazepines Urine Screen Negative Screen Negative    Opiates Urine Screen Negative Screen Negative    PCP Urine Screen Negative Screen Negative    Cannabinoids Urine Screen Negative Screen Negative    Barbiturates Urine Screen Negative Screen Negative    Cocaine Urine Screen Negative Screen Negative    Methadone Urine Screen Negative Screen Negative    Oxycodone Urine Screen Negative Screen Negative    Creatinine Urine mg/dL 112 mg/dL         PROCEDURES:   Procedures   --------------------------------------------------------------------------------   Cardiac telemetry  monitoring ordered, reviewed, and independently interpreted by me while patient was in the Emergency Department. Revealed sinus tachycardia with otherwise no acute abnormalities.  --------------------------------------------------------------------------------             --------------- ADDITIONAL MDM ---------------  History:  - I considered systemic symptoms of the presenting illness.  - Supplemental history from:       -- see above charting, if applicable: patient, home medical staff  - External Record(s) reviewed:       -- see above charting, if applicable       -- Inpatient/outpatient record (clinic visit 3/17/23), prior labs (viral swab 3/31/23), prior imaging (CXR 3/31/23)    Workup:  - Chart documentation above includes differential considered and any EKGs or imaging independently interpreted by provider.  - In additional to work up documented, I considered the following work up:       -- see above charting, if applicable    External Consultation:  - Discussion of management with another provider:       -- see above charting, if applicable    Complicating Factors:  - Care impacted by chronic illness:       -- see above MDM, past medical history, & problem list  - Care affected by social determinants of health:       -- see above social history       -- Access to Affordable Healthcare    Disposition Considerations:  - Admission considered. Patient was signed out to the oncoming physician, disposition pending.         I, Lisset Gonzalez, am serving as a scribe to document services personally performed by Dr. Chato Alanis based on my observation and the provider's statements to me. I, Chato Alanis MD attest that Lisset Gonzalez is acting in a scribe capacity, has observed my performance of the services and has documented them in accordance with my direction.      Chato Alanis MD  08/20/23  Emergency Medicine  [facility] EMERGENCY ROOM  [address]  [id number]  [phone]  Dept: [phone]     Chato Alanis MD  08/20/23 0714

## 2023-08-20 NOTE — ED NOTES
EMERGENCY DEPARTMENT SIGN OUT NOTE        ED COURSE AND MEDICAL DECISION MAKING  Patient was signed out to me by Dr Mervin Alanis at 7:00 AM  9:58 AM I spoke with DEC.     In brief, Sunil Rondon is a 38 year old male who initially presented for an evaluation of a drug overdose     At time of sign out, disposition was pending DEC assessment at 10 AM.       I received patient in signout pending his evaluation by DEC.  After DEC evaluation patient no longer seems an imminent risk to self.  He is remorseful and denies any ongoing desire to harm himself or others.  He had in-depth discussion with DEC provider about coping strategies and will continue to pursue outpatient resources including an upcoming therapy session later this week.  Also plans to follow-up with his psychiatrist.  If feeling overwhelmed again he plans to reach out to his sister and therapist or return to the emergency department.  On repeat physical evaluation he has no signs of serotonergic toxicity or other toxidrome.  No clonus, hyperreflexia, mydriasis or other symptoms.  He is tolerating oral intake without any nausea or vomiting and is awake alert and oriented answers questions appropriately.  Patient did sign a release of records form so that his encounter today may be released to his therapist and psychiatrist.  Patient discharged stable condition.    FINAL IMPRESSION    1. Suicide attempt (H)    2. Intentional overdose of trazodone (H)    3. Abrasion of left wrist, initial encounter    4. Self-cutting of wrist (H)    5. Need for tetanus booster        ED MEDS  Medications   hydrOXYzine (ATARAX) tablet 25 mg (has no administration in time range)   OLANZapine zydis (zyPREXA) ODT half-tab 10 mg (has no administration in time range)   melatonin tablet 3 mg (has no administration in time range)   amLODIPine (NORVASC) tablet 5 mg (has no administration in time range)   atorvastatin (LIPITOR) tablet 20 mg (has no administration in time range)    empagliflozin (JARDIANCE) tablet 25 mg (has no administration in time range)   cloZAPine (CLOZARIL) tablet 300 mg (has no administration in time range)   cloZAPine (CLOZARIL) tablet 50 mg (has no administration in time range)   famotidine (PEPCID) tablet 20 mg (has no administration in time range)   gabapentin (NEURONTIN) tablet 800 mg (has no administration in time range)   glipiZIDE (GLUCOTROL XL) 24 hr tablet 10 mg (has no administration in time range)   insulin glargine (LANTUS PEN) injection 30 Units (has no administration in time range)   lamoTRIgine (LaMICtal) tablet 300 mg (has no administration in time range)   liraglutide (VICTOZA) injection 1.8 mg (has no administration in time range)   losartan (COZAAR) tablet 100 mg (has no administration in time range)   metFORMIN (GLUCOPHAGE) tablet 1,000 mg (has no administration in time range)   metoprolol succinate ER (TOPROL XL) 24 hr tablet 200 mg (has no administration in time range)   omeprazole (PriLOSEC) CR capsule 40 mg (has no administration in time range)   sertraline (ZOLOFT) tablet 300 mg (has no administration in time range)   sucralfate (CARAFATE) tablet 1 g (has no administration in time range)   tiZANidine (ZANAFLEX) tablet 2 mg (has no administration in time range)   Tdap (tetanus-diphtheria-acell pertussis) (ADACEL) injection 0.5 mL (0.5 mLs Intramuscular $Given 8/20/23 0041)   magnesium oxide (MAG-OX) tablet 800 mg (800 mg Oral $Given 8/20/23 0328)   potassium chloride ER (KLOR-CON M) CR tablet 40 mEq (40 mEq Oral $Given 8/20/23 0327)       LAB  Labs Ordered and Resulted from Time of ED Arrival to Time of ED Departure   ACETAMINOPHEN LEVEL - Abnormal       Result Value    Acetaminophen <3.0 (*)    ROUTINE UA WITH MICROSCOPIC REFLEX TO CULTURE - Abnormal    Color Urine Light Yellow      Appearance Urine Clear      Glucose Urine >1000 (*)     Bilirubin Urine Negative      Ketones Urine Negative      Specific Gravity Urine 1.028      Blood Urine  Negative      pH Urine 5.5      Protein Albumin Urine 10 (*)     Urobilinogen Urine <2.0      Nitrite Urine Negative      Leukocyte Esterase Urine Negative      Mucus Urine Present (*)     RBC Urine <1      WBC Urine <1     CBC WITH PLATELETS AND DIFFERENTIAL - Abnormal    WBC Count 13.3 (*)     RBC Count 5.43      Hemoglobin 12.1 (*)     Hematocrit 40.2      MCV 74 (*)     MCH 22.3 (*)     MCHC 30.1 (*)     RDW 19.7 (*)     Platelet Count 283      % Neutrophils 75      % Lymphocytes 12      % Monocytes 11      % Eosinophils 1      % Basophils 0      % Immature Granulocytes 1      NRBCs per 100 WBC 0      Absolute Neutrophils 10.1 (*)     Absolute Lymphocytes 1.5      Absolute Monocytes 1.4 (*)     Absolute Eosinophils 0.1      Absolute Basophils 0.1      Absolute Immature Granulocytes 0.1      Absolute NRBCs 0.0     BASIC METABOLIC PANEL - Normal    Sodium 143      Potassium 3.7      Chloride 106      Carbon Dioxide (CO2) 27      Anion Gap 10      Urea Nitrogen 14      Creatinine 0.75      Calcium 8.9      Glucose 89      GFR Estimate >90     HEPATIC FUNCTION PANEL - Normal    Bilirubin Total 0.2      Bilirubin Direct 0.1      Protein Total 7.5      Albumin 3.8      Alkaline Phosphatase 89      AST 21      ALT 34     LIPASE - Normal    Lipase 49     MAGNESIUM - Normal    Magnesium 1.9     TSH WITH FREE T4 REFLEX - Normal    TSH 1.47     ETHYL ALCOHOL LEVEL - Normal    Alcohol, Blood <10     SALICYLATE LEVEL - Normal    Salicylate <8     DRUGS OF ABUSE 1+ PANEL, URINE ([facility] ONLY)    Amphetamines Urine Screen Negative      Benzodiazepines Urine Screen Negative      Opiates Urine Screen Negative      PCP Urine Screen Negative      Cannabinoids Urine Screen Negative      Barbiturates Urine Screen Negative      Cocaine Urine Screen Negative      Methadone Urine Screen Negative      Oxycodone Urine Screen Negative      Creatinine Urine mg/dL 112         EKG      RADIOLOGY    No orders to display       DISCHARGE  MEDS  New Prescriptions    No medications on file       Aidan Sterling MD  [facility] EMERGENCY ROOM  [facility] [facility] [address] [phone]       Aidan Sterling MD  08/20/23 4102

## 2023-08-20 NOTE — PHARMACY-ADMISSION MEDICATION HISTORY
"Pharmacy Intern Admission Medication History    Admission medication history is complete. The information provided in this note is only as accurate as the sources available at the time of the update.    Medication reconciliation/reorder completed by provider prior to medication history? Yes    Information Source(s): Patient, Clinic records, and CareEverywhere/SureScripts via in-person    Pertinent Information: Patient was unable to confirm last known administration times and current medications. This medication reconcile was done using based on fill history at home. Patient states that he only has taken his bed time medications PTA to hospital. Patient also states that he still takes Lantus \"by turning the dial all the way\" and injecting himself but no recent fill history to verify.     Changes made to PTA medication list:  Added: None  Deleted: Gabapentin, benzonatate, ibuprofen, and Preparation H (these medications were removed based on no fill history. Patient was consulted to discontinue glipizide  Changed: Lamotrigine: dose increased from 150mg to 200mg      Allergies reviewed with patient and updates made in EHR: yes    Medication History Completed By: Juan Daniel Bolanos [facility] 8/20/2023 8:12 AM    Prior to Admission medications    Medication Sig Last Dose Taking? Auth Provider Long Term End Date   albuterol (PROAIR HFA/PROVENTIL HFA/VENTOLIN HFA) 108 (90 Base) MCG/ACT inhaler Inhale 2 puffs into the lungs every 6 hours as needed  Unknown Yes Unknown, Entered By History     amLODIPine (NORVASC) 5 MG tablet [AMLODIPINE (NORVASC) 5 MG TABLET] Take 1 tablet (5 mg total) by mouth daily. Unknown Yes Lisset Alicea MD     atorvastatin (LIPITOR) 20 MG tablet Take 20 mg by mouth At Bedtime Unknown Yes Unknown, Entered By History Yes    canagliflozin (INVOKANA) 300 MG tablet Take 300 mg by mouth every morning (before breakfast) Unknown Yes Unknown, Entered By History     cloZAPine (CLOZARIL) 100 MG tablet [CLOZAPINE " (CLOZARIL) 100 MG TABLET] Take 3 tablets (300 mg total) by mouth at bedtime. May use home supply Unknown Yes Duglas Smith MD Yes    cloZAPine (CLOZARIL) 50 MG tablet Take 50 mg by mouth At Bedtime Unknown Yes Unknown, Entered By History Yes    famotidine (PEPCID) 20 MG tablet [FAMOTIDINE (PEPCID) 20 MG TABLET] Take 20 mg by mouth 2 (two) times a day. Unknown Yes Provider, Historical     insulin glargine (LANTUS PEN) 100 UNIT/ML pen Inject 30 Units Subcutaneous At Bedtime Unknown Yes Unknown, Entered By History Yes    lamoTRIgine (LAMICTAL) 200 MG tablet [LAMOTRIGINE (LAMICTAL) 150 MG TABLET] Take 300 mg by mouth at bedtime.        Unknown Yes Provider, Historical     liraglutide (VICTOZA) 18 MG/3ML solution Inject 1.8 mg Subcutaneous daily Unknown Yes Unknown, Entered By History Yes    losartan (COZAAR) 100 MG tablet [LOSARTAN (COZAAR) 100 MG TABLET] Take 100 mg by mouth daily. Unknown Yes Provider, Historical Yes    metFORMIN (GLUCOPHAGE) 1000 MG tablet Take 1,000 mg by mouth 2 times daily (with meals)  Unknown Yes Provider, Historical     metoprolol succinate ER (TOPROL-XL) 200 MG 24 hr tablet Take 200 mg by mouth daily Unknown Yes Unknown, Entered By History Yes    omeprazole (PRILOSEC) 40 MG capsule [OMEPRAZOLE (PRILOSEC) 40 MG CAPSULE] Take 40 mg by mouth 2 (two) times a day. Unknown Yes Provider, Historical     sertraline (ZOLOFT) 100 MG tablet [SERTRALINE (ZOLOFT) 100 MG TABLET] Take 300 mg by mouth daily. Unknown Yes Provider, Historical     sucralfate (CARAFATE) 1 GM tablet Take 1 g by mouth 4 times daily Unknown Yes Unknown, Entered By History     tiZANidine (ZANAFLEX) 2 MG tablet Take 2 mg by mouth At Bedtime Unknown Yes Unknown, Entered By History     traZODone (DESYREL) 50 MG tablet Take 50 mg by mouth 3 times daily Unknown Yes Unknown, Entered By History Yes

## 2023-08-20 NOTE — ED NOTES
Spoke with Cristiana the Group Home director to update her on patients status upon discharge.  Pt will be transported home by Taxi.

## 2023-08-20 NOTE — DISCHARGE INSTRUCTIONS
"Aftercare Plan  If I am feeling unsafe or I am in a crisis, I will:   Contact my established care providers   Call the National Suicide Prevention Lifeline: 988  Go to the nearest emergency room   Call 911     Warning signs that I or other people might notice when a crisis is developing for me:   Conflict with others  Racing thoughts  Anxiety  Thoughts of suicide or self harm  Feeling overwhelmed  Feeling irritable    Things I am able to do on my own to cope or help me feel better:   Go for a walk  Play piano  Listen to music  Deep breathing  Play video games  Watch tv  Play on computer     Things that I am able to do with others to cope or help me better:   Call a friend/family member  Go for a walk  Talk to staff     Things I can use or do for distraction:   Music  Piano  Go for a walk  Take a break from the stressful situation  Watch tv  Play video games     Changes I can make to support my mental health and wellness:   Do not engage in self harm  Follow up with therapist and psychiatrist for further support  Talk to group home staff when feeling overwhelmed     People in my life that I can ask for help:   Staff  Father     [facility] has a mental health crisis team you can call 24/7: [facility] Mobile Crisis  [phone] (adults)  [phone] (children)    Other things that are important when I'm in crisis: Remember \"this feeling won't last.\" Think about your family and friends and how they care about you!       Crisis Lines  Crisis Text Line  Text 712392  You will be connected with a trained live crisis counselor to provide support.    Por espanol, texto  DEB a 048538 o texto a 442-AYUDAME en WhatsApp    The [employer] (LGBTQ Youth Crisis Line)  8.347.286.8681  text START to 221-156      Community Resources  Fast Tracker  Linking people to mental health and substance use disorder resources  fastNujirack[employer]water [employer].org     [place] Mental Health Warm Line  Peer to peer support  Monday thru Saturday, " "12 pm to 10 pm  879.374.3372 or 3.917.329.8162  Text \"Support\" to 75261    National [place] on Mental Illness (JASON)  092.681.2625 or 1.888.JASON.HELPS      Mental Health Apps  My3  https://Threadboxpp.org/    VirtualHopeBox  https://Alive Juices/apps/virtual-hope-box/      Additional Information  Today you were seen by a licensed mental health professional through Triage and Transition services, [facility] ([facility])  for a crisis assessment in the Emergency Department at [facility].  It is recommended that you follow up with your established providers (psychiatrist, mental health therapist, and/or primary care doctor - as relevant) as soon as possible. Coordinators from [facility] will be calling you in the next 24-48 hours to ensure that you have the resources you need.  You can also contact [facility] coordinators directly at [phone]. You may have been scheduled for or offered an appointment with a mental health provider. [facility] maintains an extensive network of licensed behavioral health providers to connect patients with the services they need.  We do not charge providers a fee to participate in our referral network.  We match patients with providers based on a patient's specific needs, insurance coverage, and location.  Our first effort will be to refer you to a provider within your care system, and will utilize providers outside your care system as needed.         "

## 2024-02-22 ENCOUNTER — LAB REQUISITION (OUTPATIENT)
Dept: LAB | Facility: CLINIC | Age: 39
End: 2024-02-22

## 2024-02-22 DIAGNOSIS — R06.02 SHORTNESS OF BREATH: ICD-10-CM

## 2024-02-25 ENCOUNTER — LAB REQUISITION (OUTPATIENT)
Dept: LAB | Facility: CLINIC | Age: 39
End: 2024-02-25
Payer: MEDICAID

## 2024-02-25 DIAGNOSIS — R06.02 SHORTNESS OF BREATH: ICD-10-CM

## 2024-02-26 LAB
ANION GAP SERPL CALCULATED.3IONS-SCNC: 11 MMOL/L (ref 7–15)
BUN SERPL-MCNC: 14.2 MG/DL (ref 6–20)
CALCIUM SERPL-MCNC: 9.5 MG/DL (ref 8.6–10)
CHLORIDE SERPL-SCNC: 104 MMOL/L (ref 98–107)
CREAT SERPL-MCNC: 0.73 MG/DL (ref 0.67–1.17)
DEPRECATED HCO3 PLAS-SCNC: 25 MMOL/L (ref 22–29)
EGFRCR SERPLBLD CKD-EPI 2021: >90 ML/MIN/1.73M2
ERYTHROCYTE [DISTWIDTH] IN BLOOD BY AUTOMATED COUNT: 19.6 % (ref 10–15)
GLUCOSE SERPL-MCNC: 92 MG/DL (ref 70–99)
HCT VFR BLD AUTO: 39.3 % (ref 40–53)
HGB BLD-MCNC: 11.6 G/DL (ref 13.3–17.7)
MCH RBC QN AUTO: 22.1 PG (ref 26.5–33)
MCHC RBC AUTO-ENTMCNC: 29.5 G/DL (ref 31.5–36.5)
MCV RBC AUTO: 75 FL (ref 78–100)
NT-PROBNP SERPL-MCNC: <36 PG/ML (ref 0–450)
PLATELET # BLD AUTO: 286 10E3/UL (ref 150–450)
POTASSIUM SERPL-SCNC: 3.9 MMOL/L (ref 3.4–5.3)
PROCALCITONIN SERPL IA-MCNC: <0.02 NG/ML
RBC # BLD AUTO: 5.26 10E6/UL (ref 4.4–5.9)
SODIUM SERPL-SCNC: 140 MMOL/L (ref 135–145)
WBC # BLD AUTO: 12.7 10E3/UL (ref 4–11)

## 2024-02-26 PROCEDURE — 80048 BASIC METABOLIC PNL TOTAL CA: CPT | Mod: ORL

## 2024-02-26 PROCEDURE — 36415 COLL VENOUS BLD VENIPUNCTURE: CPT | Mod: ORL

## 2024-02-26 PROCEDURE — P9604 ONE-WAY ALLOW PRORATED TRIP: HCPCS | Mod: ORL

## 2024-02-26 PROCEDURE — 85027 COMPLETE CBC AUTOMATED: CPT | Mod: ORL

## 2024-02-26 PROCEDURE — 84145 PROCALCITONIN (PCT): CPT | Mod: ORL

## 2024-02-26 PROCEDURE — 83880 ASSAY OF NATRIURETIC PEPTIDE: CPT | Mod: ORL

## 2024-09-02 ENCOUNTER — HOSPITAL ENCOUNTER (EMERGENCY)
Facility: CLINIC | Age: 39
Discharge: HOME | End: 2024-09-02
Payer: MEDICAID

## 2024-09-02 VITALS
SYSTOLIC BLOOD PRESSURE: 119 MMHG | TEMPERATURE: 98.9 F | DIASTOLIC BLOOD PRESSURE: 82 MMHG | RESPIRATION RATE: 20 BRPM | OXYGEN SATURATION: 99 % | HEART RATE: 101 BPM

## 2024-09-02 DIAGNOSIS — U07.1 COVID-19 VIRUS INFECTION: ICD-10-CM

## 2024-09-02 LAB
FLUAV RNA SPEC QL NAA+PROBE: NEGATIVE
FLUBV RNA RESP QL NAA+PROBE: NEGATIVE
GROUP A STREP BY PCR: NOT DETECTED
RSV RNA SPEC NAA+PROBE: NEGATIVE
SARS-COV-2 RNA RESP QL NAA+PROBE: POSITIVE

## 2024-09-02 PROCEDURE — 87651 STREP A DNA AMP PROBE: CPT | Performed by: EMERGENCY MEDICINE

## 2024-09-02 PROCEDURE — 99283 EMERGENCY DEPT VISIT LOW MDM: CPT

## 2024-09-02 PROCEDURE — 87637 SARSCOV2&INF A&B&RSV AMP PRB: CPT | Performed by: EMERGENCY MEDICINE

## 2024-09-02 RX ORDER — FLUTICASONE PROPIONATE 50 MCG
1 SPRAY, SUSPENSION (ML) NASAL DAILY
Qty: 16 G | Refills: 0 | Status: SHIPPED | OUTPATIENT
Start: 2024-09-02 | End: 2024-09-27

## 2024-09-02 RX ORDER — ACETAMINOPHEN 500 MG
1000 TABLET ORAL EVERY 6 HOURS PRN
Qty: 50 TABLET | Refills: 0 | Status: SHIPPED | OUTPATIENT
Start: 2024-09-02

## 2024-09-02 RX ORDER — BENZONATATE 200 MG/1
200 CAPSULE ORAL 3 TIMES DAILY PRN
Qty: 30 CAPSULE | Refills: 0 | Status: SHIPPED | OUTPATIENT
Start: 2024-09-02 | End: 2024-09-27

## 2024-09-02 ASSESSMENT — ACTIVITIES OF DAILY LIVING (ADL): ADLS_ACUITY_SCORE: 36

## 2024-09-03 NOTE — ED PROVIDER NOTES
EMERGENCY DEPARTMENT ENCOUNTER      NAME: Sunil Rondon  AGE: 39 year old male  YOB: 1985  MRN: [id number]  EVALUATION DATE & TIME: No admission date for patient encounter.    PCP: Bertrand Valle    ED PROVIDER: Dinora Jensen PA-C    FINAL IMPRESSION:   COVID-positive    CHIEF COMPLAINT:  Sore throat  Cough  Body aches    MEDICAL DECISION MAKING:  Sunil Rondon is a 39 year old male with a pertinent history of T2 DM, developmental delay, GERD, MAYRA, schizoaffective disorder who presents to this ED by walk in for evaluation of URI symptoms. Overall well-appearing in no acute distress.  Lungs clear to auscultation throughout without wheezing, stridor, crackles.  Posterior pharynx mildly erythematous and uvula midline.  Abdomen nontender.    Testing reveals positive COVID and negative influenza/RSV/strep.  Updated patient on this.  He is asking questions about Paxlovid.  I discussed with the ED pharmacist who reviewed the patient's medications and he is on medications that are contraindicated so Paxlovid is not an option.  Although he does have a history of obesity, diabetes, and hypertension, I do think that overall clinically he is doing well here.  He is only borderline tachycardic.  He is afebrile.  His oxygen sats are 99%.  He is not tachypneic. His lungs are clear. He has no cough while I'm in the room. He's tolerating PO. Will recommend a plethora of OTC medications that he can try including lozenges, cough medicine, decongestants, etc.  Strict return precautions were discussed.  I did call and speak to someone from his group home and update them on the positive results.  Patient was discharged in stable condition.    ED COURSE  Pertinent Labs & Imaging studies reviewed. (See chart for details)  9:06 PM I met with the patient and obtained a history and performed a physical exam. I discussed plan for discharge. Return precautions were discussed. Discharged by ED RN.     ED Course as of  09/04/24 1543   Mon Sep 02, 2024   2109 Headaches body aches coughing sore throat congestion rhinorrhea fatigue    No CP, SOB, diarrhea, nausea     No hx asthma   No sick contacts, travel    DM II        Medical Decision Making  Obtained supplemental history:Supplemental history obtained?: Family Member/Significant Other  Reviewed external records: External records reviewed?: No  Care impacted by chronic illness:Diabetes  Care significantly affected by social determinants of health:N/A  Did you consider but not order tests?: Work up considered but not performed and documented in chart, if applicable  Did you interpret images independently?: Independent interpretation of ECG and images noted in documentation, when applicable.  Consultation discussion with other provider:Did you involve another provider (consultant, , pharmacy, etc.)?: I discussed the care with another health care provider, see documentation for details.  Discharge. No recommendations on prescription strength medication(s). See documentation for any additional details.        0 minutes of critical care time     MEDICATIONS GIVEN IN THE EMERGENCY:  Medications - No data to display    NEW PRESCRIPTIONS STARTED AT TODAY'S ER VISIT  Discharge Medication List as of 9/2/2024  9:45 PM        START taking these medications    Details   acetaminophen (TYLENOL) 500 MG tablet Take 2 tablets (1,000 mg) by mouth every 6 hours as needed for pain or fever., Disp-50 tablet, R-0, Local Print      benzonatate (TESSALON) 200 MG capsule Take 1 capsule (200 mg) by mouth 3 times daily as needed for cough., Disp-30 capsule, R-0, Local Print      fluticasone (FLONASE) 50 MCG/ACT nasal spray Spray 1 spray into both nostrils daily., Disp-16 g, R-0, Local Print           Discharge Medication List as of 9/2/2024  9:45 PM          =================================================================    HPI    Patient information was obtained from: Patient, family member  Use of  : N/A     Sunil Rondon is a 39 year old male with a pertinent history of T2DM who presents to this ED by walk in for evaluation of URI symptoms.    The patient endorses a few days of headache, body aches, cough, sore throat, congestion, runny nose and fatigue. He denies chest pain, shortness of breath, diarrhea, nausea, recent sick contacts or recent travel. Patient does not have a history of asthma and or any other concerns at this time.    PHYSICAL EXAM    /82   Pulse 101   Temp 98.9  F (37.2  C) (Oral)   Resp 20   SpO2 99%   Constitutional: Well developed, well appearing, no distress  HENT: Normocephalic, Atraumatic, Bilateral external ears normal, Oropharynx mildly erythematous without tonsillar or uvular enlargement, mucous membranes moist, Nose normal. Neck- Normal range of motion, No tenderness   Eyes: PERRL, EOMI, Conjunctiva normal, No discharge.   Respiratory: Normal breath sounds, No respiratory distress, No wheezing, Speaks full sentences easily. No cough.    Cardiovascular: Normal heart rate, Regular rhythm, No murmurs, No rubs, No gallops.   GI: excessive obesity. Soft, No tenderness, No masses, No flank tenderness. No rebound or guarding.    Musculoskeletal:  Good range of motion in all major joints.   Integument: Warm, Dry, No erythema, No rash.    Neurologic: Alert & oriented x 3, Normal motor function, Normal sensory function, No focal deficits noted. Normal gait.    Psychiatric: Affect normal, Mood normal. Cooperative.      LAB:  All pertinent labs reviewed and interpreted.  Results for orders placed or performed during the hospital encounter of 09/02/24   Symptomatic Influenza A/B, RSV, & SARS-CoV2 PCR (COVID-19) Nasopharyngeal    Specimen: Nasopharyngeal; Swab   Result Value Ref Range    Influenza A PCR Negative Negative    Influenza B PCR Negative Negative    RSV PCR Negative Negative    SARS CoV2 PCR Positive (A) Negative   Group A Streptococcus PCR Throat Swab     Specimen: Throat; Swab   Result Value Ref Range    Group A strep by PCR Not Detected Not Detected       RADIOLOGY:  Reviewed all pertinent imaging. Please see official radiology report.  No orders to display            I, Mario Reyez, am serving as a scribe to document services personally performed by Dinora Jensen PA-C based on my observation and the provider's statements to me. I, Dinora Jensen PA-C, attest that Mario Reyez is acting in a scribe capacity, has observed my performance of the services and has documented them in accordance with my direction.    Dinora Jensen PA-C  [facility] EMERGENCY ROOM  [address] [facility] [address] [phone]      Dinora Jensen PA-C  09/04/24 1540

## 2024-09-03 NOTE — ED TRIAGE NOTES
"Body aches and sore throat x2-3 days, no sick contacts that he's aware of. Tylenol around 1700    When asked SI screening questions pt reports \"I have a long history with that;\" when asked if he wanted to speak with a provider about his mental health today he was unable to give a definitive answer but states that he is not having active thoughts now and is feeling safe to wait in the lobby. Pt instructed to let staff know if worsening.     Triage Assessment (Adult)       Row Name 09/02/24 2008          Respiratory WDL    Respiratory WDL X;cough     Cough Frequency infrequent     Cough Type congested        Skin Circulation/Temperature WDL    Skin Circulation/Temperature WDL WDL        Cardiac WDL    Cardiac WDL WDL        Peripheral/Neurovascular WDL    Peripheral Neurovascular WDL WDL        Cognitive/Neuro/Behavioral WDL    Cognitive/Neuro/Behavioral WDL WDL                     "

## 2024-09-03 NOTE — ED NOTES
Pt remains A&O, he declines discharge vitals. Is ambulatory and otherwise stable at discharge; he is agreeable with plan to lay low at home and able to verbalize reasons to return to the ER.

## 2024-09-03 NOTE — DISCHARGE INSTRUCTIONS
You are seen in the emergency department for viral-like symptoms including cough, runny nose, sore throat, etc.  Your COVID-19 test is positive.  This is a viral infection that has to run its course.  You can take Tylenol 1000 mg 4 times a day for body aches and headache.  I will recommend a decongestant that you can take to help with nasal congestion.  I will also recommend a cough medicine that you can take to help with cough.    I spoke with the pharmacist and you are on medications that are completely contraindicated to take Paxlovid.  This means that if you took the medications at the same time it would cause an interaction or would cause her medications do not work as well.  This means that I cannot prescribe it for you here today.  Overall I think this is okay.  Your vitals here are reassuring.  Your lungs sound clear.  This should start to get better on its own as long as you take the cough medicine, take Tylenol, and rest.  Try to drink a lot of fluids.  Return to the emergency department if you have any difficulty breathing or any new or worsening symptoms.

## 2024-09-27 ENCOUNTER — APPOINTMENT (OUTPATIENT)
Dept: CT IMAGING | Facility: CLINIC | Age: 39
End: 2024-09-27
Payer: MEDICAID

## 2024-09-27 ENCOUNTER — HOSPITAL ENCOUNTER (INPATIENT)
Facility: CLINIC | Age: 39
LOS: 3 days | Discharge: HOME | End: 2024-09-30
Admitting: STUDENT IN AN ORGANIZED HEALTH CARE EDUCATION/TRAINING PROGRAM
Payer: MEDICAID

## 2024-09-27 ENCOUNTER — APPOINTMENT (OUTPATIENT)
Dept: RADIOLOGY | Facility: CLINIC | Age: 39
End: 2024-09-27
Payer: MEDICAID

## 2024-09-27 DIAGNOSIS — U07.1 COVID-19: ICD-10-CM

## 2024-09-27 DIAGNOSIS — N12 PYELONEPHRITIS: ICD-10-CM

## 2024-09-27 DIAGNOSIS — A41.9 SEPSIS, DUE TO UNSPECIFIED ORGANISM, UNSPECIFIED WHETHER ACUTE ORGAN DYSFUNCTION PRESENT (H): ICD-10-CM

## 2024-09-27 DIAGNOSIS — N39.0 URINARY TRACT INFECTION WITHOUT HEMATURIA, SITE UNSPECIFIED: Primary | ICD-10-CM

## 2024-09-27 LAB
ALBUMIN UR-MCNC: 10 MG/DL
ANION GAP SERPL CALCULATED.3IONS-SCNC: 18 MMOL/L (ref 7–15)
APPEARANCE UR: CLEAR
ATRIAL RATE - MUSE: 111 BPM
B-OH-BUTYR SERPL-SCNC: <0.18 MMOL/L
BACTERIA #/AREA URNS HPF: ABNORMAL /HPF
BASOPHILS # BLD AUTO: 0 10E3/UL (ref 0–0.2)
BASOPHILS NFR BLD AUTO: 0 %
BILIRUB UR QL STRIP: NEGATIVE
BUN SERPL-MCNC: 13.6 MG/DL (ref 6–20)
CALCIUM SERPL-MCNC: 9.6 MG/DL (ref 8.8–10.4)
CHLORIDE SERPL-SCNC: 101 MMOL/L (ref 98–107)
COLOR UR AUTO: YELLOW
CREAT SERPL-MCNC: 0.8 MG/DL (ref 0.67–1.17)
CRP SERPL-MCNC: 215 MG/L
DIASTOLIC BLOOD PRESSURE - MUSE: 67 MMHG
EGFRCR SERPLBLD CKD-EPI 2021: >90 ML/MIN/1.73M2
EOSINOPHIL # BLD AUTO: 0 10E3/UL (ref 0–0.7)
EOSINOPHIL NFR BLD AUTO: 0 %
ERYTHROCYTE [DISTWIDTH] IN BLOOD BY AUTOMATED COUNT: 19.7 % (ref 10–15)
FLUAV RNA SPEC QL NAA+PROBE: NEGATIVE
FLUBV RNA RESP QL NAA+PROBE: NEGATIVE
GLUCOSE BLDC GLUCOMTR-MCNC: 236 MG/DL (ref 70–99)
GLUCOSE BLDC GLUCOMTR-MCNC: 75 MG/DL (ref 70–99)
GLUCOSE SERPL-MCNC: 191 MG/DL (ref 70–99)
GLUCOSE UR STRIP-MCNC: 1000 MG/DL
HCO3 SERPL-SCNC: 20 MMOL/L (ref 22–29)
HCT VFR BLD AUTO: 39.8 % (ref 40–53)
HGB BLD-MCNC: 12 G/DL (ref 13.3–17.7)
HGB UR QL STRIP: ABNORMAL
IMM GRANULOCYTES # BLD: 0.1 10E3/UL
IMM GRANULOCYTES NFR BLD: 1 %
INTERPRETATION ECG - MUSE: NORMAL
KETONES UR STRIP-MCNC: NEGATIVE MG/DL
LACTATE SERPL-SCNC: 1.6 MMOL/L (ref 0.7–2)
LACTATE SERPL-SCNC: 2.2 MMOL/L (ref 0.7–2)
LEUKOCYTE ESTERASE UR QL STRIP: ABNORMAL
LYMPHOCYTES # BLD AUTO: 1.1 10E3/UL (ref 0.8–5.3)
LYMPHOCYTES NFR BLD AUTO: 6 %
MCH RBC QN AUTO: 22.1 PG (ref 26.5–33)
MCHC RBC AUTO-ENTMCNC: 30.2 G/DL (ref 31.5–36.5)
MCV RBC AUTO: 73 FL (ref 78–100)
MONOCYTES # BLD AUTO: 1.2 10E3/UL (ref 0–1.3)
MONOCYTES NFR BLD AUTO: 7 %
NEUTROPHILS # BLD AUTO: 15.6 10E3/UL (ref 1.6–8.3)
NEUTROPHILS NFR BLD AUTO: 86 %
NITRATE UR QL: NEGATIVE
NRBC # BLD AUTO: 0 10E3/UL
NRBC BLD AUTO-RTO: 0 /100
P AXIS - MUSE: 53 DEGREES
PH UR STRIP: 6 [PH] (ref 5–7)
PLATELET # BLD AUTO: 230 10E3/UL (ref 150–450)
POTASSIUM SERPL-SCNC: 4.1 MMOL/L (ref 3.4–5.3)
PR INTERVAL - MUSE: 168 MS
QRS DURATION - MUSE: 108 MS
QT - MUSE: 346 MS
QTC - MUSE: 470 MS
R AXIS - MUSE: 69 DEGREES
RBC # BLD AUTO: 5.42 10E6/UL (ref 4.4–5.9)
RBC URINE: 96 /HPF
RSV RNA SPEC NAA+PROBE: NEGATIVE
SARS-COV-2 RNA RESP QL NAA+PROBE: POSITIVE
SODIUM SERPL-SCNC: 139 MMOL/L (ref 135–145)
SP GR UR STRIP: <1.005 (ref 1–1.03)
SYSTOLIC BLOOD PRESSURE - MUSE: 124 MMHG
T AXIS - MUSE: 47 DEGREES
TROPONIN T SERPL HS-MCNC: <6 NG/L
UROBILINOGEN UR STRIP-MCNC: <2 MG/DL
VENTRICULAR RATE- MUSE: 111 BPM
WBC # BLD AUTO: 18.1 10E3/UL (ref 4–11)
WBC CLUMPS #/AREA URNS HPF: PRESENT /HPF
WBC URINE: 59 /HPF

## 2024-09-27 PROCEDURE — 87186 SC STD MICRODIL/AGAR DIL: CPT

## 2024-09-27 PROCEDURE — 96361 HYDRATE IV INFUSION ADD-ON: CPT

## 2024-09-27 PROCEDURE — 258N000003 HC RX IP 258 OP 636

## 2024-09-27 PROCEDURE — 250N000013 HC RX MED GY IP 250 OP 250 PS 637: Performed by: STUDENT IN AN ORGANIZED HEALTH CARE EDUCATION/TRAINING PROGRAM

## 2024-09-27 PROCEDURE — 81001 URINALYSIS AUTO W/SCOPE: CPT

## 2024-09-27 PROCEDURE — 36415 COLL VENOUS BLD VENIPUNCTURE: CPT | Performed by: INTERNAL MEDICINE

## 2024-09-27 PROCEDURE — 87637 SARSCOV2&INF A&B&RSV AMP PRB: CPT

## 2024-09-27 PROCEDURE — 93005 ELECTROCARDIOGRAM TRACING: CPT

## 2024-09-27 PROCEDURE — 71046 X-RAY EXAM CHEST 2 VIEWS: CPT

## 2024-09-27 PROCEDURE — 82805 BLOOD GASES W/O2 SATURATION: CPT | Performed by: INTERNAL MEDICINE

## 2024-09-27 PROCEDURE — 250N000011 HC RX IP 250 OP 636

## 2024-09-27 PROCEDURE — 82962 GLUCOSE BLOOD TEST: CPT

## 2024-09-27 PROCEDURE — 85025 COMPLETE CBC W/AUTO DIFF WBC: CPT

## 2024-09-27 PROCEDURE — 87040 BLOOD CULTURE FOR BACTERIA: CPT

## 2024-09-27 PROCEDURE — 84484 ASSAY OF TROPONIN QUANT: CPT

## 2024-09-27 PROCEDURE — 80048 BASIC METABOLIC PNL TOTAL CA: CPT

## 2024-09-27 PROCEDURE — 82010 KETONE BODYS QUAN: CPT

## 2024-09-27 PROCEDURE — 99223 1ST HOSP IP/OBS HIGH 75: CPT | Performed by: STUDENT IN AN ORGANIZED HEALTH CARE EDUCATION/TRAINING PROGRAM

## 2024-09-27 PROCEDURE — 96375 TX/PRO/DX INJ NEW DRUG ADDON: CPT

## 2024-09-27 PROCEDURE — 120N000001 HC R&B MED SURG/OB

## 2024-09-27 PROCEDURE — 86140 C-REACTIVE PROTEIN: CPT | Performed by: STUDENT IN AN ORGANIZED HEALTH CARE EDUCATION/TRAINING PROGRAM

## 2024-09-27 PROCEDURE — 96374 THER/PROPH/DIAG INJ IV PUSH: CPT | Mod: 59

## 2024-09-27 PROCEDURE — 99285 EMERGENCY DEPT VISIT HI MDM: CPT | Mod: 25

## 2024-09-27 PROCEDURE — 250N000011 HC RX IP 250 OP 636: Performed by: STUDENT IN AN ORGANIZED HEALTH CARE EDUCATION/TRAINING PROGRAM

## 2024-09-27 PROCEDURE — 83605 ASSAY OF LACTIC ACID: CPT

## 2024-09-27 PROCEDURE — 74177 CT ABD & PELVIS W/CONTRAST: CPT

## 2024-09-27 PROCEDURE — 36415 COLL VENOUS BLD VENIPUNCTURE: CPT

## 2024-09-27 RX ORDER — TRAZODONE HYDROCHLORIDE 100 MG/1
100 TABLET ORAL AT BEDTIME
Status: DISCONTINUED | OUTPATIENT
Start: 2024-09-27 | End: 2024-09-30 | Stop reason: HOSPADM

## 2024-09-27 RX ORDER — KETOROLAC TROMETHAMINE 15 MG/ML
15 INJECTION, SOLUTION INTRAMUSCULAR; INTRAVENOUS ONCE
Status: COMPLETED | OUTPATIENT
Start: 2024-09-27 | End: 2024-09-27

## 2024-09-27 RX ORDER — LIDOCAINE 40 MG/G
CREAM TOPICAL
Status: DISCONTINUED | OUTPATIENT
Start: 2024-09-27 | End: 2024-09-30 | Stop reason: HOSPADM

## 2024-09-27 RX ORDER — AMOXICILLIN 250 MG
1 CAPSULE ORAL 2 TIMES DAILY PRN
Status: DISCONTINUED | OUTPATIENT
Start: 2024-09-27 | End: 2024-09-30 | Stop reason: HOSPADM

## 2024-09-27 RX ORDER — ENOXAPARIN SODIUM 100 MG/ML
40 INJECTION SUBCUTANEOUS EVERY 12 HOURS
Status: DISCONTINUED | OUTPATIENT
Start: 2024-09-27 | End: 2024-09-30 | Stop reason: HOSPADM

## 2024-09-27 RX ORDER — ATORVASTATIN CALCIUM 10 MG/1
20 TABLET, FILM COATED ORAL AT BEDTIME
Status: DISCONTINUED | OUTPATIENT
Start: 2024-09-27 | End: 2024-09-30 | Stop reason: HOSPADM

## 2024-09-27 RX ORDER — TRIHEXYPHENIDYL HYDROCHLORIDE 5 MG/1
5 TABLET ORAL AT BEDTIME
Status: DISCONTINUED | OUTPATIENT
Start: 2024-09-27 | End: 2024-09-27

## 2024-09-27 RX ORDER — MORPHINE SULFATE 4 MG/ML
4 INJECTION, SOLUTION INTRAMUSCULAR; INTRAVENOUS EVERY 4 HOURS PRN
Status: DISCONTINUED | OUTPATIENT
Start: 2024-09-27 | End: 2024-09-27

## 2024-09-27 RX ORDER — CALCIUM CARBONATE 500 MG/1
1000 TABLET, CHEWABLE ORAL 4 TIMES DAILY PRN
Status: DISCONTINUED | OUTPATIENT
Start: 2024-09-27 | End: 2024-09-30 | Stop reason: HOSPADM

## 2024-09-27 RX ORDER — CEFTRIAXONE 2 G/1
2 INJECTION, POWDER, FOR SOLUTION INTRAMUSCULAR; INTRAVENOUS EVERY 24 HOURS
Status: DISCONTINUED | OUTPATIENT
Start: 2024-09-28 | End: 2024-09-28

## 2024-09-27 RX ORDER — LOSARTAN POTASSIUM 50 MG/1
100 TABLET ORAL DAILY
Status: DISCONTINUED | OUTPATIENT
Start: 2024-09-28 | End: 2024-09-30 | Stop reason: HOSPADM

## 2024-09-27 RX ORDER — MAGNESIUM HYDROXIDE/ALUMINUM HYDROXICE/SIMETHICONE 120; 1200; 1200 MG/30ML; MG/30ML; MG/30ML
30 SUSPENSION ORAL EVERY 4 HOURS PRN
Status: DISCONTINUED | OUTPATIENT
Start: 2024-09-27 | End: 2024-09-30 | Stop reason: HOSPADM

## 2024-09-27 RX ORDER — ONDANSETRON 2 MG/ML
4 INJECTION INTRAMUSCULAR; INTRAVENOUS ONCE
Status: COMPLETED | OUTPATIENT
Start: 2024-09-27 | End: 2024-09-27

## 2024-09-27 RX ORDER — DEXTROSE MONOHYDRATE 25 G/50ML
25-50 INJECTION, SOLUTION INTRAVENOUS
Status: DISCONTINUED | OUTPATIENT
Start: 2024-09-27 | End: 2024-09-30 | Stop reason: HOSPADM

## 2024-09-27 RX ORDER — LIRAGLUTIDE 6 MG/ML
1.8 INJECTION SUBCUTANEOUS DAILY
Status: DISCONTINUED | OUTPATIENT
Start: 2024-09-28 | End: 2024-09-30 | Stop reason: HOSPADM

## 2024-09-27 RX ORDER — PROCHLORPERAZINE 25 MG
25 SUPPOSITORY, RECTAL RECTAL EVERY 12 HOURS PRN
Status: DISCONTINUED | OUTPATIENT
Start: 2024-09-27 | End: 2024-09-30 | Stop reason: HOSPADM

## 2024-09-27 RX ORDER — NALOXONE HYDROCHLORIDE 0.4 MG/ML
0.2 INJECTION, SOLUTION INTRAMUSCULAR; INTRAVENOUS; SUBCUTANEOUS
Status: DISCONTINUED | OUTPATIENT
Start: 2024-09-27 | End: 2024-09-30 | Stop reason: HOSPADM

## 2024-09-27 RX ORDER — AMOXICILLIN 250 MG
2 CAPSULE ORAL 2 TIMES DAILY PRN
Status: DISCONTINUED | OUTPATIENT
Start: 2024-09-27 | End: 2024-09-30 | Stop reason: HOSPADM

## 2024-09-27 RX ORDER — ONDANSETRON 2 MG/ML
4 INJECTION INTRAMUSCULAR; INTRAVENOUS EVERY 6 HOURS PRN
Status: DISCONTINUED | OUTPATIENT
Start: 2024-09-27 | End: 2024-09-30 | Stop reason: HOSPADM

## 2024-09-27 RX ORDER — ONDANSETRON 4 MG/1
4 TABLET, ORALLY DISINTEGRATING ORAL EVERY 6 HOURS PRN
Status: DISCONTINUED | OUTPATIENT
Start: 2024-09-27 | End: 2024-09-30 | Stop reason: HOSPADM

## 2024-09-27 RX ORDER — PANTOPRAZOLE SODIUM 40 MG/1
40 TABLET, DELAYED RELEASE ORAL 2 TIMES DAILY
Status: DISCONTINUED | OUTPATIENT
Start: 2024-09-27 | End: 2024-09-30 | Stop reason: HOSPADM

## 2024-09-27 RX ORDER — FAMOTIDINE 20 MG/1
20 TABLET, FILM COATED ORAL 2 TIMES DAILY
Status: DISCONTINUED | OUTPATIENT
Start: 2024-09-27 | End: 2024-09-30 | Stop reason: HOSPADM

## 2024-09-27 RX ORDER — PROCHLORPERAZINE MALEATE 10 MG
10 TABLET ORAL EVERY 6 HOURS PRN
Status: DISCONTINUED | OUTPATIENT
Start: 2024-09-27 | End: 2024-09-30 | Stop reason: HOSPADM

## 2024-09-27 RX ORDER — LAMOTRIGINE 100 MG/1
200 TABLET ORAL AT BEDTIME
Status: DISCONTINUED | OUTPATIENT
Start: 2024-09-27 | End: 2024-09-30 | Stop reason: HOSPADM

## 2024-09-27 RX ORDER — AMLODIPINE BESYLATE 5 MG/1
5 TABLET ORAL DAILY
Status: DISCONTINUED | OUTPATIENT
Start: 2024-09-28 | End: 2024-09-30 | Stop reason: HOSPADM

## 2024-09-27 RX ORDER — NALOXONE HYDROCHLORIDE 0.4 MG/ML
0.4 INJECTION, SOLUTION INTRAMUSCULAR; INTRAVENOUS; SUBCUTANEOUS
Status: DISCONTINUED | OUTPATIENT
Start: 2024-09-27 | End: 2024-09-30 | Stop reason: HOSPADM

## 2024-09-27 RX ORDER — MORPHINE SULFATE 4 MG/ML
3 INJECTION, SOLUTION INTRAMUSCULAR; INTRAVENOUS EVERY 4 HOURS PRN
Status: DISCONTINUED | OUTPATIENT
Start: 2024-09-27 | End: 2024-09-30 | Stop reason: HOSPADM

## 2024-09-27 RX ORDER — BUSPIRONE HYDROCHLORIDE 15 MG/1
15 TABLET ORAL 3 TIMES DAILY
COMMUNITY
Start: 2024-02-16

## 2024-09-27 RX ORDER — CALCIUM CARBONATE 500 MG/1
1000 TABLET, CHEWABLE ORAL 2 TIMES DAILY PRN
Status: DISCONTINUED | OUTPATIENT
Start: 2024-09-27 | End: 2024-09-30 | Stop reason: HOSPADM

## 2024-09-27 RX ORDER — ALBUTEROL SULFATE 90 UG/1
2 AEROSOL, METERED RESPIRATORY (INHALATION) EVERY 6 HOURS PRN
Status: DISCONTINUED | OUTPATIENT
Start: 2024-09-27 | End: 2024-09-30 | Stop reason: HOSPADM

## 2024-09-27 RX ORDER — IOPAMIDOL 755 MG/ML
100 INJECTION, SOLUTION INTRAVASCULAR ONCE
Status: COMPLETED | OUTPATIENT
Start: 2024-09-27 | End: 2024-09-27

## 2024-09-27 RX ORDER — NICOTINE POLACRILEX 4 MG
15-30 LOZENGE BUCCAL
Status: DISCONTINUED | OUTPATIENT
Start: 2024-09-27 | End: 2024-09-30 | Stop reason: HOSPADM

## 2024-09-27 RX ORDER — CEFTRIAXONE 1 G/1
1 INJECTION, POWDER, FOR SOLUTION INTRAMUSCULAR; INTRAVENOUS ONCE
Status: COMPLETED | OUTPATIENT
Start: 2024-09-27 | End: 2024-09-27

## 2024-09-27 RX ORDER — ACETAMINOPHEN 500 MG
1000 TABLET ORAL EVERY 6 HOURS PRN
Status: DISCONTINUED | OUTPATIENT
Start: 2024-09-27 | End: 2024-09-30 | Stop reason: HOSPADM

## 2024-09-27 RX ORDER — SERTRALINE HYDROCHLORIDE 100 MG/1
300 TABLET, FILM COATED ORAL DAILY
Status: DISCONTINUED | OUTPATIENT
Start: 2024-09-28 | End: 2024-09-30 | Stop reason: HOSPADM

## 2024-09-27 RX ORDER — METOPROLOL SUCCINATE 100 MG/1
200 TABLET, EXTENDED RELEASE ORAL DAILY
Status: DISCONTINUED | OUTPATIENT
Start: 2024-09-28 | End: 2024-09-30 | Stop reason: HOSPADM

## 2024-09-27 RX ORDER — TIZANIDINE 2 MG/1
2 TABLET ORAL AT BEDTIME
Status: DISCONTINUED | OUTPATIENT
Start: 2024-09-27 | End: 2024-09-30 | Stop reason: HOSPADM

## 2024-09-27 RX ORDER — NITROGLYCERIN 0.4 MG/1
0.4 TABLET SUBLINGUAL EVERY 5 MIN PRN
Status: DISCONTINUED | OUTPATIENT
Start: 2024-09-27 | End: 2024-09-30 | Stop reason: HOSPADM

## 2024-09-27 RX ORDER — SALIVA STIMULANT COMB. NO.3
1 SPRAY, NON-AEROSOL (ML) MUCOUS MEMBRANE 4 TIMES DAILY PRN
Status: DISCONTINUED | OUTPATIENT
Start: 2024-09-27 | End: 2024-09-30 | Stop reason: HOSPADM

## 2024-09-27 RX ORDER — BUSPIRONE HYDROCHLORIDE 5 MG/1
15 TABLET ORAL 3 TIMES DAILY
Status: DISCONTINUED | OUTPATIENT
Start: 2024-09-27 | End: 2024-09-30 | Stop reason: HOSPADM

## 2024-09-27 RX ORDER — TRIHEXYPHENIDYL HYDROCHLORIDE 5 MG/1
5 TABLET ORAL AT BEDTIME
Status: DISCONTINUED | OUTPATIENT
Start: 2024-09-27 | End: 2024-09-28

## 2024-09-27 RX ORDER — TRIHEXYPHENIDYL HYDROCHLORIDE 5 MG/1
5 TABLET ORAL AT BEDTIME
COMMUNITY

## 2024-09-27 RX ADMIN — ONDANSETRON 4 MG: 2 INJECTION INTRAMUSCULAR; INTRAVENOUS at 18:03

## 2024-09-27 RX ADMIN — TIZANIDINE 2 MG: 2 TABLET ORAL at 23:05

## 2024-09-27 RX ADMIN — CLOZAPINE 325 MG: 100 TABLET ORAL at 22:12

## 2024-09-27 RX ADMIN — BUSPIRONE HYDROCHLORIDE 15 MG: 5 TABLET ORAL at 22:12

## 2024-09-27 RX ADMIN — ACETAMINOPHEN 1000 MG: 500 TABLET ORAL at 23:08

## 2024-09-27 RX ADMIN — PANTOPRAZOLE SODIUM 40 MG: 40 TABLET, DELAYED RELEASE ORAL at 23:05

## 2024-09-27 RX ADMIN — ENOXAPARIN SODIUM 40 MG: 100 INJECTION SUBCUTANEOUS at 22:13

## 2024-09-27 RX ADMIN — LAMOTRIGINE 200 MG: 100 TABLET ORAL at 23:05

## 2024-09-27 RX ADMIN — CEFTRIAXONE 1 G: 1 INJECTION, POWDER, FOR SOLUTION INTRAMUSCULAR; INTRAVENOUS at 20:08

## 2024-09-27 RX ADMIN — KETOROLAC TROMETHAMINE 15 MG: 15 INJECTION, SOLUTION INTRAMUSCULAR; INTRAVENOUS at 18:03

## 2024-09-27 RX ADMIN — ATORVASTATIN CALCIUM 20 MG: 10 TABLET, FILM COATED ORAL at 22:12

## 2024-09-27 RX ADMIN — FAMOTIDINE 20 MG: 20 TABLET, FILM COATED ORAL at 22:12

## 2024-09-27 RX ADMIN — TRAZODONE HYDROCHLORIDE 100 MG: 50 TABLET ORAL at 22:12

## 2024-09-27 RX ADMIN — SODIUM CHLORIDE 1000 ML: 9 INJECTION, SOLUTION INTRAVENOUS at 20:07

## 2024-09-27 RX ADMIN — IOPAMIDOL 100 ML: 755 INJECTION, SOLUTION INTRAVENOUS at 19:24

## 2024-09-27 RX ADMIN — SODIUM CHLORIDE 1000 ML: 9 INJECTION, SOLUTION INTRAVENOUS at 17:55

## 2024-09-27 ASSESSMENT — ACTIVITIES OF DAILY LIVING (ADL)
ADLS_ACUITY_SCORE: 38

## 2024-09-27 ASSESSMENT — COLUMBIA-SUICIDE SEVERITY RATING SCALE - C-SSRS
1. IN THE PAST MONTH, HAVE YOU WISHED YOU WERE DEAD OR WISHED YOU COULD GO TO SLEEP AND NOT WAKE UP?: NO
6. HAVE YOU EVER DONE ANYTHING, STARTED TO DO ANYTHING, OR PREPARED TO DO ANYTHING TO END YOUR LIFE?: NO
2. HAVE YOU ACTUALLY HAD ANY THOUGHTS OF KILLING YOURSELF IN THE PAST MONTH?: NO

## 2024-09-27 NOTE — ED TRIAGE NOTES
Pt presents to the ED with 2 days of chest pain, increased urinary frequency, elevated blood sugars, headache, dysuria beginning yesterday.

## 2024-09-27 NOTE — ED PROVIDER NOTES
EMERGENCY DEPARTMENT ENCOUNTER      NAME: Sunil Rondon  AGE: 39 year old male  YOB: 1985  MRN: [id number]  EVALUATION DATE & TIME: No admission date for patient encounter.    PCP: Bertrand Valle    ED PROVIDER: Trent Buchanan MD      Chief Complaint   Patient presents with    Hyperglycemia    Chest Pain    Urinary Frequency         FINAL IMPRESSION:  1. Sepsis, due to unspecified organism, unspecified whether acute organ dysfunction present (H)    2. Pyelonephritis    3. COVID-19          ED COURSE & MEDICAL DECISION MAKING:    Pertinent Labs & Imaging studies reviewed. (See chart for details)  39 year old male presents to the Emergency Department for evaluation of hyperglycemia, urinary frequency.  Differential diagnosis considered sepsis, UTI, pyelonephritis, kidney stone, appendicitis, bowel obstruction, perforated viscus, ACS, PE, aortic dissection.     ED Course as of 09/27/24 2009   Fri Sep 27, 2024   1752 I met with the patient, obtained history, performed an initial exam, and discussed options and plan for diagnostics and treatment here in the ED.     39-year-old male with a history of mental retardation, morbid obesity, hypertension, diabetes presenting with increased urinary frequency, dysuria, rigors, chest pain abdominal pain and nausea and vomiting.  On arrival patient is tachycardic, diaphoretic however in no acute distress blood pressure was normal.  His abdomen was nontender however did have lower pelvis pain.  Denies back pain.  Obtain broad workup including a UA and ordered a CT scan of his abdomen.   Symptomatically treat him with IV fluids, Toradol and Zofran.     1913 Lactic acid whole blood(!)  Elevated lactate, severe sepsis given tachycardia and leukocytosis.  Ordered blood culture and will give ceftriaxone for pyelonephritis.   1914 UA with Microscopic reflex to Culture(!)  White blood cell clumps, leukocytosis and bacteria as well as WBCs.  Concern for pyelonephritis.  He  also had increased RBCs.  Still awaiting CT scan of the abdomen.   1916 Symptomatic Influenza A/B, RSV, & SARS-CoV2 PCR (COVID-19) Nasopharyngeal(!)  COVID is positive.  He does have symptoms of URI however the symptoms also could be due to his underlying UTI.  He was previously COVID-positive and may still have positive testing from earlier this month when he was found to be COVID-positive.   1916 XR Chest 2 Views  No pneumonia   2009 I spoke with the hospitalist, made plans for admission.     CT scan did not reveal acute abnormality.  Overall her presentation is most consistent with UTI/pyelonephritis causing patient's sepsis.  He also has hyperglycemia without evidence of DKA.  He was found to be COVID-positive however was +3 weeks ago.  This most likely is continued to be positive from past COVID illness.       Not Applicable    I discussed the patient with the hospitalist, Dr. Hernandez, who accepts the patient for admission.     At the conclusion of the encounter I discussed the results of all of the tests and the disposition. The questions were answered. The patient or family acknowledged understanding and was agreeable with the care plan.     MEDICATIONS GIVEN IN THE EMERGENCY:  Medications   acetaminophen (TYLENOL) tablet 1,000 mg (has no administration in time range)   albuterol (PROVENTIL HFA/VENTOLIN HFA) inhaler (has no administration in time range)   amLODIPine (NORVASC) tablet 5 mg (has no administration in time range)   atorvastatin (LIPITOR) tablet 20 mg (20 mg Oral $Given 9/27/24 2212)   busPIRone (BUSPAR) tablet 15 mg (15 mg Oral $Given 9/27/24 2212)   empagliflozin (JARDIANCE) tablet 25 mg ( Oral Automatically Held 10/1/24 0800)   cariprazine (VRAYLAR) capsule 6 mg (has no administration in time range)   cloZAPine (CLOZARIL) tablet 325 mg (325 mg Oral $Given 9/27/24 2212)   famotidine (PEPCID) tablet 20 mg (20 mg Oral $Given 9/27/24 2212)   lamoTRIgine (LaMICtal) tablet 200 mg (has no administration  in time range)   liraglutide (VICTOZA) injection 1.8 mg ( Subcutaneous Automatically Held 10/1/24 0800)   losartan (COZAAR) tablet 100 mg ( Oral Automatically Held 10/1/24 0800)   metFORMIN (GLUCOPHAGE) tablet 1,000 mg ( Oral Automatically Held 9/30/24 1800)   metoprolol succinate ER (TOPROL XL) 24 hr tablet 200 mg (has no administration in time range)   pantoprazole (PROTONIX) EC tablet 40 mg (has no administration in time range)   sertraline (ZOLOFT) tablet 300 mg (has no administration in time range)   tiZANidine (ZANAFLEX) tablet 2 mg (has no administration in time range)   traZODone (DESYREL) tablet 100 mg (100 mg Oral $Given 9/27/24 2212)   cefTRIAXone (ROCEPHIN) 2 g vial to attach to  ml bag for ADULTS or NS 50 ml bag for PEDS (has no administration in time range)   lidocaine 1 % 0.1-1 mL (has no administration in time range)   lidocaine (LMX4) cream (has no administration in time range)   sodium chloride (PF) 0.9% PF flush 3 mL (has no administration in time range)   sodium chloride (PF) 0.9% PF flush 3 mL (has no administration in time range)   senna-docusate (SENOKOT-S/PERICOLACE) 8.6-50 MG per tablet 1 tablet (has no administration in time range)     Or   senna-docusate (SENOKOT-S/PERICOLACE) 8.6-50 MG per tablet 2 tablet (has no administration in time range)   calcium carbonate (TUMS) chewable tablet 1,000 mg (has no administration in time range)   glucose gel 15-30 g (has no administration in time range)     Or   dextrose 50 % injection 25-50 mL (has no administration in time range)     Or   glucagon injection 1 mg (has no administration in time range)   enoxaparin ANTICOAGULANT (LOVENOX) injection 40 mg (40 mg Subcutaneous $Given 9/27/24 2213)   melatonin tablet 5 mg (has no administration in time range)   ondansetron (ZOFRAN ODT) ODT tab 4 mg (has no administration in time range)     Or   ondansetron (ZOFRAN) injection 4 mg (has no administration in time range)   prochlorperazine (COMPAZINE)  injection 10 mg (has no administration in time range)     Or   prochlorperazine (COMPAZINE) tablet 10 mg (has no administration in time range)     Or   prochlorperazine (COMPAZINE) suppository 25 mg (has no administration in time range)   benzocaine-menthol (CEPACOL) 15-3.6 MG lozenge 1 lozenge (has no administration in time range)   artificial saliva (BIOTENE MT) solution 1 spray (has no administration in time range)   insulin aspart (NovoLOG) injection (RAPID ACTING) (has no administration in time range)   insulin aspart (NovoLOG) injection (RAPID ACTING) ( Subcutaneous Not Given 9/27/24 2150)   trihexyphenidyl (ARTANE) half-tab 5 mg (has no administration in time range)   sodium chloride 0.9% BOLUS 1,000 mL (0 mLs Intravenous Stopped 9/27/24 1951)   ketorolac (TORADOL) injection 15 mg (15 mg Intravenous $Given 9/27/24 1803)   ondansetron (ZOFRAN) injection 4 mg (4 mg Intravenous $Given 9/27/24 1803)   cefTRIAXone (ROCEPHIN) 1 g vial to attach to  mL bag for ADULTS or NS 50 mL bag for PEDS (0 g Intravenous Stopped 9/27/24 2222)   iopamidol (ISOVUE-370) solution 100 mL (100 mLs Intravenous $Given 9/27/24 1924)   sodium chloride 0.9% BOLUS 1,000 mL (0 mLs Intravenous Stopped 9/27/24 2222)       NEW PRESCRIPTIONS STARTED AT TODAY'S ER VISIT  New Prescriptions    No medications on file     Modified Medications    No medications on file       =================================================================    HPI    Patient information was obtained from: patient     Use of : N/A         Sunil Rondon is a 39 year old male with a pertinent history of T2DM, HTN, GERD, anxiety who presents to this ED for evaluation of abdominal pain, vomiting, concern for UTI and headaches.     The patient reports that his nurse nava is concerned that he is hypoglycemic and may have a UTI. He said he does not feel well and vomited last night, but none today-even though he feels nauseated. He reports having some lower  "abdominal pain, unknown for how long and has terrible headaches. He reports onset of chest pain and discomfort from yesterday, along with trouble breathing and body aches. Of note, he has not taken his victoza in two weeks, but took it today at 4 PM.     Patient denies diarrhea or cough.     9/2/2024 [facility] ED visit for sore throat, cough and body aches. Patient positive for COVID. Discharged in stable condition with recommendations to try taking lozenges, cough medicine and decongestants.     PHYSICAL EXAM    /59   Pulse 100   Temp 99.4  F (37.4  C) (Oral)   Resp 18   Ht 1.93 m (6' 4\")   Wt (!) 206.4 kg (455 lb)   SpO2 93%   BMI 55.38 kg/m      Constitutional: Obese. Comfortable appearing.  Head: Normocephalic, atraumatic, mucous membranes moist, nose normal.   Neck: Supple, gross ROM intact.   Eyes: Pupils mid-range, sclera white.  Respiratory: Clear to auscultation bilaterally, no respiratory distress, no wheezing, speaks full sentences easily.  Cardiovascular: Normal heart rate, regular rhythm, no murmurs. No lower extremity edema.   GI: Soft, no tenderness to deep palpation in all quadrants. No peritonitis.   Musculoskeletal: Moving all 4 extremities intentionally and without pain. No obvious deformity.  Skin: Warm, dry, no rash.  Neurologic: Alert & oriented x 3, speech clear, moving all extremities spontaneously   Psychiatric: Affect normal, cooperative.       LAB:  All pertinent labs reviewed and interpreted.  Results for orders placed or performed during the hospital encounter of 09/27/24   XR Chest 2 Views    Impression    IMPRESSION: Negative chest.   CT Abdomen Pelvis w Contrast    Impression    IMPRESSION:   1.  Changes most typical for a benign cyst in the left kidney.    2.  Mild thoracolumbar spinal curvature with associated mild hypertrophic changes.   Glucose by meter   Result Value Ref Range    GLUCOSE BY METER POCT 236 (H) 70 - 99 mg/dL   Basic metabolic panel   Result Value Ref " Range    Sodium 139 135 - 145 mmol/L    Potassium 4.1 3.4 - 5.3 mmol/L    Chloride 101 98 - 107 mmol/L    Carbon Dioxide (CO2) 20 (L) 22 - 29 mmol/L    Anion Gap 18 (H) 7 - 15 mmol/L    Urea Nitrogen 13.6 6.0 - 20.0 mg/dL    Creatinine 0.80 0.67 - 1.17 mg/dL    GFR Estimate >90 >60 mL/min/1.73m2    Calcium 9.6 8.8 - 10.4 mg/dL    Glucose 191 (H) 70 - 99 mg/dL   Ketone Beta-Hydroxybutyrate Quantitative   Result Value Ref Range    Ketone (Beta-Hydroxybutyrate) Quantitative <0.18 <=0.30 mmol/L   UA with Microscopic reflex to Culture    Specimen: Urine, Midstream   Result Value Ref Range    Color Urine Yellow Colorless, Straw, Light Yellow, Yellow    Appearance Urine Clear Clear    Glucose Urine 1000 (A) Negative mg/dL    Bilirubin Urine Negative Negative    Ketones Urine Negative Negative mg/dL    Specific Gravity Urine <1.005 (L) 1.005 - 1.030    Blood Urine 0.2 mg/dL (A) Negative    pH Urine 6.0 5.0 - 7.0    Protein Albumin Urine 10 (A) Negative mg/dL    Urobilinogen Urine <2.0 <2.0 mg/dL    Nitrite Urine Negative Negative    Leukocyte Esterase Urine 75 Sonny/uL (A) Negative    Bacteria Urine Few (A) None Seen /HPF    WBC Clumps Urine Present (A) None Seen /HPF    RBC Urine 96 (H) <=2 /HPF    WBC Urine 59 (H) <=5 /HPF   Result Value Ref Range    Troponin T, High Sensitivity <6 <=22 ng/L   CBC with platelets and differential   Result Value Ref Range    WBC Count 18.1 (H) 4.0 - 11.0 10e3/uL    RBC Count 5.42 4.40 - 5.90 10e6/uL    Hemoglobin 12.0 (L) 13.3 - 17.7 g/dL    Hematocrit 39.8 (L) 40.0 - 53.0 %    MCV 73 (L) 78 - 100 fL    MCH 22.1 (L) 26.5 - 33.0 pg    MCHC 30.2 (L) 31.5 - 36.5 g/dL    RDW 19.7 (H) 10.0 - 15.0 %    Platelet Count 230 150 - 450 10e3/uL    % Neutrophils 86 %    % Lymphocytes 6 %    % Monocytes 7 %    % Eosinophils 0 %    % Basophils 0 %    % Immature Granulocytes 1 %    NRBCs per 100 WBC 0 <1 /100    Absolute Neutrophils 15.6 (H) 1.6 - 8.3 10e3/uL    Absolute Lymphocytes 1.1 0.8 - 5.3 10e3/uL     Absolute Monocytes 1.2 0.0 - 1.3 10e3/uL    Absolute Eosinophils 0.0 0.0 - 0.7 10e3/uL    Absolute Basophils 0.0 0.0 - 0.2 10e3/uL    Absolute Immature Granulocytes 0.1 <=0.4 10e3/uL    Absolute NRBCs 0.0 10e3/uL   Lactic acid whole blood   Result Value Ref Range    Lactic Acid 2.2 (H) 0.7 - 2.0 mmol/L   Symptomatic Influenza A/B, RSV, & SARS-CoV2 PCR (COVID-19) Nasopharyngeal    Specimen: Nasopharyngeal; Swab   Result Value Ref Range    Influenza A PCR Negative Negative    Influenza B PCR Negative Negative    RSV PCR Negative Negative    SARS CoV2 PCR Positive (A) Negative   Lactic acid whole blood   Result Value Ref Range    Lactic Acid 1.6 0.7 - 2.0 mmol/L   Result Value Ref Range    CRP Inflammation 215.00 (H) <5.00 mg/L   Glucose by meter   Result Value Ref Range    GLUCOSE BY METER POCT 75 70 - 99 mg/dL   ECG 12-LEAD WITH MUSE (LHE)   Result Value Ref Range    Systolic Blood Pressure 124 mmHg    Diastolic Blood Pressure 67 mmHg    Ventricular Rate 111 BPM    Atrial Rate 111 BPM    NC Interval 168 ms    QRS Duration 108 ms     ms    QTc 470 ms    P Axis 53 degrees    R AXIS 69 degrees    T Axis 47 degrees    Interpretation ECG       Sinus tachycardia  Incomplete right bundle branch block  Borderline ECG  When compared with ECG of 20-Aug-2023 04:24,  No significant change was found  Confirmed by SEE ED PROVIDER NOTE FOR, ECG INTERPRETATION ([id number]),  CATRACHITO LUNSFORD ([id number]) on 9/27/2024 8:44:55 PM         RADIOLOGY:  Reviewed all pertinent imaging. Please see official radiology report.  CT Abdomen Pelvis w Contrast   Final Result   IMPRESSION:    1.  Changes most typical for a benign cyst in the left kidney.      2.  Mild thoracolumbar spinal curvature with associated mild hypertrophic changes.      XR Chest 2 Views   Final Result   IMPRESSION: Negative chest.          EKG:    Performed at: 17:10, 27-SEP-2024    Impression: Sinus tachycardia, incomplete right bundle branch block, borderline ECG.      Rate: 111 bpm  Rhythm: Sinus  AL Interval: 168 ms  QRS Interval: 108 ms  QTc Interval: 470 ms  ST Changes: No significant change found   Comparison: EKG comparison from 20-AUG-2024    I have independently reviewed and interpreted the EKG(s) documented above.        Trent Buchanan MD  [facility] EMERGENCY ROOM  [address] [facility] [address]  [phone]   =================================================================    BILLING:  Data  Category 1  Non-ED record review, if applicable. External record reviewed:  [facility] ED visit 9/2/2024     Clinical information was obtained from an independent historian. History was obtained from: Patient     The following testing was considered but ultimately not selected after discussion with patient/family: N/A     Category 2  My independent interpretation of EKG, rhythm strip, radiology study: CT abdomen and pelvis did not reveal large AAA     Category 3  Discussion of management with other physician/healthcare provider/other source: Spoke to hospitalist regarding patient's ED course and agrees with admission to the hospital        Risk  Prescription medication was considered, but ultimately not given after discussion with patient/family: N/A     Chronic conditions affecting care: Diabetes and Hypertension, Borderline mental retardation, Bipolar 1 disorder, Morbid obesity     Care significantly affected by Social Determinants of Health: N/A     Consideration of Admission/Observation: Admitted to hospital       [employer] Documentation:   CMS Diagnoses: The patient has signs of sepsis   Sepsis ED evaluation   The patient has signs of sepsis as evidenced by:  1. Presence of 2 SIRS criteria, suspected infection, AND  2. Organ dysfunction: Lactic Acidosis with value >2.0 due to infection    Time zero:  1802  on 09/27/24 as this was the time when Lactate was resulted and the level was greater than 2.    Note: Due to a  national blood culture bottle shortage, reduced blood cultures may have been drawn on this patient.    Lactic Acid Results:  Recent Labs   Lab Test 09/27/24 2005 09/27/24  1802 11/14/21  0056   LACT 1.6 2.2* 1.8       3 Hour Bundle 6 Hour Bundle (Reassessment)   Blood Cultures before IV Antibiotics: Yes  Antibiotics given: see below  Prehospital fluid volume (mL):                     Total fluids given (ED +Pre-hospital):  The patient responded to a lesser volume of IV fluids. The initial volume ordered was 1000 mL.    Repeat Lactic Acid Level: Ordered by reflex for 2 hours after initial lactic acid collection.  Vasopressors:  Or not needed .  Repeat perfusion exam: I attest to having performed a repeat sepsis exam and assessment of perfusion at  did not perform .   BMI Readings from Last 1 Encounters:   09/27/24 55.38 kg/m        Anti-infectives (From admission through now)      Start     Dose/Rate Route Frequency Ordered Stop    09/27/24 1930  cefTRIAXone (ROCEPHIN) 1 g vial to attach to  mL bag for ADULTS or NS 50 mL bag for PEDS         1 g  over 15-30 Minutes Intravenous ONCE 09/27/24 1902 09/27/24 2222                         I, Lexi Medina, am serving as a scribe to document services personally performed byTrent Buchanan MD based on my observation and the provider's statements to me. I, Trent Buchanan MD, attest that Lexi Medina is acting in a scribe capacity, has observed my performance of the services and has documented them in accordance with my direction.     Trent Buchanan MD  09/27/24 3769

## 2024-09-28 LAB
ALBUMIN SERPL BCG-MCNC: 3.6 G/DL (ref 3.5–5.2)
ALP SERPL-CCNC: 89 U/L (ref 40–150)
ALT SERPL W P-5'-P-CCNC: 26 U/L (ref 0–70)
ANION GAP SERPL CALCULATED.3IONS-SCNC: 10 MMOL/L (ref 7–15)
AST SERPL W P-5'-P-CCNC: 20 U/L (ref 0–45)
BACTERIA UR CULT: ABNORMAL
BASE EXCESS BLDV CALC-SCNC: 3.5 MMOL/L (ref -3–3)
BILIRUB DIRECT SERPL-MCNC: <0.2 MG/DL (ref 0–0.3)
BILIRUB SERPL-MCNC: 0.4 MG/DL
BUN SERPL-MCNC: 12.5 MG/DL (ref 6–20)
CALCIUM SERPL-MCNC: 8.9 MG/DL (ref 8.8–10.4)
CHLORIDE SERPL-SCNC: 106 MMOL/L (ref 98–107)
CREAT SERPL-MCNC: 0.67 MG/DL (ref 0.67–1.17)
CRP SERPL-MCNC: 191 MG/L
D DIMER PPP FEU-MCNC: 0.28 UG/ML FEU (ref 0–0.5)
EGFRCR SERPLBLD CKD-EPI 2021: >90 ML/MIN/1.73M2
ERYTHROCYTE [DISTWIDTH] IN BLOOD BY AUTOMATED COUNT: 19.2 % (ref 10–15)
GLUCOSE BLDC GLUCOMTR-MCNC: 102 MG/DL (ref 70–99)
GLUCOSE BLDC GLUCOMTR-MCNC: 102 MG/DL (ref 70–99)
GLUCOSE BLDC GLUCOMTR-MCNC: 104 MG/DL (ref 70–99)
GLUCOSE BLDC GLUCOMTR-MCNC: 108 MG/DL (ref 70–99)
GLUCOSE BLDC GLUCOMTR-MCNC: 97 MG/DL (ref 70–99)
GLUCOSE SERPL-MCNC: 98 MG/DL (ref 70–99)
HCO3 BLDV-SCNC: 30 MMOL/L (ref 21–28)
HCO3 SERPL-SCNC: 25 MMOL/L (ref 22–29)
HCT VFR BLD AUTO: 34.9 % (ref 40–53)
HGB BLD-MCNC: 10.4 G/DL (ref 13.3–17.7)
LIPASE SERPL-CCNC: 21 U/L (ref 13–60)
MAGNESIUM SERPL-MCNC: 2 MG/DL (ref 1.7–2.3)
MCH RBC QN AUTO: 22.2 PG (ref 26.5–33)
MCHC RBC AUTO-ENTMCNC: 29.8 G/DL (ref 31.5–36.5)
MCV RBC AUTO: 74 FL (ref 78–100)
O2/TOTAL GAS SETTING VFR VENT: 0 %
OXYHGB MFR BLDV: 40 % (ref 70–75)
PCO2 BLDV: 51 MM HG (ref 40–50)
PH BLDV: 7.37 [PH] (ref 7.32–7.43)
PLATELET # BLD AUTO: 192 10E3/UL (ref 150–450)
PO2 BLDV: 26 MM HG (ref 25–47)
POTASSIUM SERPL-SCNC: 3.7 MMOL/L (ref 3.4–5.3)
PROT SERPL-MCNC: 7 G/DL (ref 6.4–8.3)
RBC # BLD AUTO: 4.69 10E6/UL (ref 4.4–5.9)
SAO2 % BLDV: 41.1 % (ref 70–75)
SODIUM SERPL-SCNC: 141 MMOL/L (ref 135–145)
TROPONIN T SERPL HS-MCNC: <6 NG/L
WBC # BLD AUTO: 15.1 10E3/UL (ref 4–11)

## 2024-09-28 PROCEDURE — 83690 ASSAY OF LIPASE: CPT | Performed by: INTERNAL MEDICINE

## 2024-09-28 PROCEDURE — XW033E5 INTRODUCTION OF REMDESIVIR ANTI-INFECTIVE INTO PERIPHERAL VEIN, PERCUTANEOUS APPROACH, NEW TECHNOLOGY GROUP 5: ICD-10-PCS | Performed by: STUDENT IN AN ORGANIZED HEALTH CARE EDUCATION/TRAINING PROGRAM

## 2024-09-28 PROCEDURE — 250N000012 HC RX MED GY IP 250 OP 636 PS 637: Performed by: STUDENT IN AN ORGANIZED HEALTH CARE EDUCATION/TRAINING PROGRAM

## 2024-09-28 PROCEDURE — 250N000013 HC RX MED GY IP 250 OP 250 PS 637: Performed by: STUDENT IN AN ORGANIZED HEALTH CARE EDUCATION/TRAINING PROGRAM

## 2024-09-28 PROCEDURE — 93010 ELECTROCARDIOGRAM REPORT: CPT | Performed by: INTERNAL MEDICINE

## 2024-09-28 PROCEDURE — 85027 COMPLETE CBC AUTOMATED: CPT | Performed by: STUDENT IN AN ORGANIZED HEALTH CARE EDUCATION/TRAINING PROGRAM

## 2024-09-28 PROCEDURE — 86140 C-REACTIVE PROTEIN: CPT | Performed by: STUDENT IN AN ORGANIZED HEALTH CARE EDUCATION/TRAINING PROGRAM

## 2024-09-28 PROCEDURE — 93005 ELECTROCARDIOGRAM TRACING: CPT

## 2024-09-28 PROCEDURE — 36415 COLL VENOUS BLD VENIPUNCTURE: CPT | Performed by: STUDENT IN AN ORGANIZED HEALTH CARE EDUCATION/TRAINING PROGRAM

## 2024-09-28 PROCEDURE — 85379 FIBRIN DEGRADATION QUANT: CPT | Performed by: INTERNAL MEDICINE

## 2024-09-28 PROCEDURE — 84484 ASSAY OF TROPONIN QUANT: CPT | Performed by: INTERNAL MEDICINE

## 2024-09-28 PROCEDURE — 120N000001 HC R&B MED SURG/OB

## 2024-09-28 PROCEDURE — 82248 BILIRUBIN DIRECT: CPT | Performed by: STUDENT IN AN ORGANIZED HEALTH CARE EDUCATION/TRAINING PROGRAM

## 2024-09-28 PROCEDURE — 99233 SBSQ HOSP IP/OBS HIGH 50: CPT | Performed by: STUDENT IN AN ORGANIZED HEALTH CARE EDUCATION/TRAINING PROGRAM

## 2024-09-28 PROCEDURE — 80048 BASIC METABOLIC PNL TOTAL CA: CPT | Performed by: STUDENT IN AN ORGANIZED HEALTH CARE EDUCATION/TRAINING PROGRAM

## 2024-09-28 PROCEDURE — 250N000011 HC RX IP 250 OP 636: Performed by: STUDENT IN AN ORGANIZED HEALTH CARE EDUCATION/TRAINING PROGRAM

## 2024-09-28 PROCEDURE — 83735 ASSAY OF MAGNESIUM: CPT | Performed by: STUDENT IN AN ORGANIZED HEALTH CARE EDUCATION/TRAINING PROGRAM

## 2024-09-28 PROCEDURE — 258N000003 HC RX IP 258 OP 636: Performed by: STUDENT IN AN ORGANIZED HEALTH CARE EDUCATION/TRAINING PROGRAM

## 2024-09-28 RX ORDER — CEFTRIAXONE 1 G/1
1 INJECTION, POWDER, FOR SOLUTION INTRAMUSCULAR; INTRAVENOUS EVERY 24 HOURS
Status: DISCONTINUED | OUTPATIENT
Start: 2024-09-28 | End: 2024-09-30 | Stop reason: HOSPADM

## 2024-09-28 RX ORDER — POTASSIUM CHLORIDE 1500 MG/1
20 TABLET, EXTENDED RELEASE ORAL ONCE
Status: COMPLETED | OUTPATIENT
Start: 2024-09-28 | End: 2024-09-28

## 2024-09-28 RX ORDER — MAGNESIUM OXIDE 400 MG/1
400 TABLET ORAL EVERY 4 HOURS
Status: COMPLETED | OUTPATIENT
Start: 2024-09-28 | End: 2024-09-28

## 2024-09-28 RX ORDER — HYDROXYZINE HYDROCHLORIDE 25 MG/1
25 TABLET, FILM COATED ORAL EVERY 6 HOURS PRN
Status: DISCONTINUED | OUTPATIENT
Start: 2024-09-28 | End: 2024-09-30 | Stop reason: HOSPADM

## 2024-09-28 RX ORDER — HYDROXYZINE HYDROCHLORIDE 25 MG/1
50 TABLET, FILM COATED ORAL EVERY 6 HOURS PRN
Status: DISCONTINUED | OUTPATIENT
Start: 2024-09-28 | End: 2024-09-30 | Stop reason: HOSPADM

## 2024-09-28 RX ADMIN — FAMOTIDINE 20 MG: 20 TABLET, FILM COATED ORAL at 08:52

## 2024-09-28 RX ADMIN — SERTRALINE 300 MG: 100 TABLET, FILM COATED ORAL at 08:57

## 2024-09-28 RX ADMIN — LAMOTRIGINE 200 MG: 100 TABLET ORAL at 21:06

## 2024-09-28 RX ADMIN — Medication 400 MG: at 12:26

## 2024-09-28 RX ADMIN — SODIUM CHLORIDE 50 ML: 9 INJECTION, SOLUTION INTRAVENOUS at 16:00

## 2024-09-28 RX ADMIN — REMDESIVIR 200 MG: 100 INJECTION, POWDER, LYOPHILIZED, FOR SOLUTION INTRAVENOUS at 15:56

## 2024-09-28 RX ADMIN — BUSPIRONE HYDROCHLORIDE 15 MG: 5 TABLET ORAL at 20:21

## 2024-09-28 RX ADMIN — BUSPIRONE HYDROCHLORIDE 15 MG: 5 TABLET ORAL at 13:15

## 2024-09-28 RX ADMIN — CLOZAPINE 325 MG: 100 TABLET ORAL at 20:22

## 2024-09-28 RX ADMIN — AMLODIPINE BESYLATE 5 MG: 5 TABLET ORAL at 08:52

## 2024-09-28 RX ADMIN — PANTOPRAZOLE SODIUM 40 MG: 40 TABLET, DELAYED RELEASE ORAL at 08:49

## 2024-09-28 RX ADMIN — CEFTRIAXONE 1 G: 1 INJECTION, POWDER, FOR SOLUTION INTRAMUSCULAR; INTRAVENOUS at 17:24

## 2024-09-28 RX ADMIN — TRIHEXYPHENIDYL HYDROCHLORIDE 5 MG: 2 TABLET ORAL at 20:22

## 2024-09-28 RX ADMIN — ATORVASTATIN CALCIUM 20 MG: 10 TABLET, FILM COATED ORAL at 20:22

## 2024-09-28 RX ADMIN — BUSPIRONE HYDROCHLORIDE 15 MG: 5 TABLET ORAL at 08:49

## 2024-09-28 RX ADMIN — PANTOPRAZOLE SODIUM 40 MG: 40 TABLET, DELAYED RELEASE ORAL at 20:22

## 2024-09-28 RX ADMIN — FAMOTIDINE 20 MG: 20 TABLET, FILM COATED ORAL at 20:22

## 2024-09-28 RX ADMIN — TIZANIDINE 2 MG: 2 TABLET ORAL at 21:06

## 2024-09-28 RX ADMIN — ENOXAPARIN SODIUM 40 MG: 100 INJECTION SUBCUTANEOUS at 21:06

## 2024-09-28 RX ADMIN — METOPROLOL SUCCINATE 200 MG: 100 TABLET, EXTENDED RELEASE ORAL at 08:49

## 2024-09-28 RX ADMIN — POTASSIUM CHLORIDE 20 MEQ: 1500 TABLET, EXTENDED RELEASE ORAL at 08:49

## 2024-09-28 RX ADMIN — ACETAMINOPHEN 1000 MG: 500 TABLET ORAL at 13:15

## 2024-09-28 RX ADMIN — TRAZODONE HYDROCHLORIDE 100 MG: 50 TABLET ORAL at 21:06

## 2024-09-28 RX ADMIN — TRIHEXYPHENIDYL HYDROCHLORIDE 5 MG: 2 TABLET ORAL at 03:02

## 2024-09-28 RX ADMIN — CARIPRAZINE 6 MG: 1.5 CAPSULE, GELATIN COATED ORAL at 10:09

## 2024-09-28 RX ADMIN — HYDROXYZINE HYDROCHLORIDE 50 MG: 25 TABLET ORAL at 17:24

## 2024-09-28 RX ADMIN — ENOXAPARIN SODIUM 40 MG: 100 INJECTION SUBCUTANEOUS at 10:09

## 2024-09-28 RX ADMIN — Medication 400 MG: at 08:49

## 2024-09-28 RX ADMIN — DEXAMETHASONE 6 MG: 2 TABLET ORAL at 20:22

## 2024-09-28 ASSESSMENT — ACTIVITIES OF DAILY LIVING (ADL)
ADLS_ACUITY_SCORE: 39
ADLS_ACUITY_SCORE: 40
ADLS_ACUITY_SCORE: 40
ADLS_ACUITY_SCORE: 38
ADLS_ACUITY_SCORE: 39
ADLS_ACUITY_SCORE: 40
ADLS_ACUITY_SCORE: 39
ADLS_ACUITY_SCORE: 40
ADLS_ACUITY_SCORE: 38
ADLS_ACUITY_SCORE: 40
ADLS_ACUITY_SCORE: 39
ADLS_ACUITY_SCORE: 40
ADLS_ACUITY_SCORE: 40
ADLS_ACUITY_SCORE: 39
ADLS_ACUITY_SCORE: 40
ADLS_ACUITY_SCORE: 39
ADLS_ACUITY_SCORE: 40
ADLS_ACUITY_SCORE: 38
ADLS_ACUITY_SCORE: 39

## 2024-09-28 NOTE — PLAN OF CARE
Goal Outcome Evaluation:  Note from [id number]. VSS on 2L O2, refused CPAP. Pt c/o shortness of breath, chest pain, and headache, tylenol given, MD notified, EKG and labs ordered. A&Ox4. Pt rated pain 5/10 during shift. No new skin issues noted. Tingling in feet. SBA for transferring. Left PIV SL. Voiding adequately. Education of medication administration and use of call-light to reduce risk for falls and injury. No further issues noted.     Plan of Care Reviewed With: patient  Problem: Adult Inpatient Plan of Care  Goal: Plan of Care Review  Description: The Plan of Care Review/Shift note should be completed every shift.  The Outcome Evaluation is a brief statement about your assessment that the patient is improving, declining, or no change.  This information will be displayed automatically on your shift  note.  Outcome: Progressing  Flowsheets (Taken 9/28/2024 0803)  Plan of Care Reviewed With: patient     Problem: Adult Inpatient Plan of Care  Goal: Optimal Comfort and Wellbeing  Outcome: Progressing     Problem: Adult Inpatient Plan of Care  Goal: Readiness for Transition of Care  Outcome: Progressing

## 2024-09-28 NOTE — PROVIDER NOTIFICATION
Patient is complaining of chest pain, midsternal. Rates 5/10. Described as aching pain. Denies pain radiating. States that its intermittent but when the pain is present will last for hours. First started yesterday. Precipitating factors unknown. Complains of mild SOB. O2 sats 95% on RA. Febrile at 101.5. /58, pulse tachy at 102 RR 18. Also complains of headache 8/10, PRN tylenol given. MD paged.     Orders for labs, EKG, and PRN medications, see MAR for details.    Nataly Corado RN, BSN

## 2024-09-28 NOTE — PROGRESS NOTES
RT Note:    Pt was ordered CPAP at night. Pt states he does not wear CPAP at night. Offered pt CPAP, pt refused stated he may not be able to sleep with CPAP machine.

## 2024-09-28 NOTE — MEDICATION SCRIBE - ADMISSION MEDICATION HISTORY
Medication Scribe Admission Medication History    Admission medication history is complete. The information provided in this note is only as accurate as the sources available at the time of the update.    Information Source(s): Caregiver, Clinic records, and CareEverywhere/SureScripts via in-person and phone    Pertinent Information: [facility] spoke with the behavioral health nurse Deloris at [phone] to determine which medications were taken at their respective times.     Patient self-administers his own medications; however, they come in prepackaged bubble packs from [employer]. Endorses taking AM and 1300 doses but did not take PM/HS yet PTA.     Changes made to PTA medication list:  Added:   Buspirone 15 mg  Cariprazine 1.5 mg  Trihexyphenidyl 5 mg  Deleted:   Benzonatate 200 mg  Clozapine 100 mg (duplicate)  Fluticasone 50 mcg/act  Insulin glargine 100 unit(s)/mL  Sucralfate 1 g  Changed:   Clozapine 350 mg at bedtime --> 300 mg at bedtime   Lamotrigine 300 mg at bedtime --> 200 mg at bedtime   Trazodone 50 mg TID --> 100 mg at bedtime     Allergies reviewed with patient and updates made in EHR: yes    Medication History Completed By: Jericho Hawley 9/27/2024 9:03 PM    PTA Med List   Medication Sig Last Dose    acetaminophen (TYLENOL) 500 MG tablet Take 2 tablets (1,000 mg) by mouth every 6 hours as needed for pain or fever. Unknown at PRN    albuterol (PROAIR HFA/PROVENTIL HFA/VENTOLIN HFA) 108 (90 Base) MCG/ACT inhaler Inhale 2 puffs into the lungs every 6 hours as needed  Unknown at PRN    amLODIPine (NORVASC) 5 MG tablet [AMLODIPINE (NORVASC) 5 MG TABLET] Take 1 tablet (5 mg total) by mouth daily. 9/27/2024 at AM    atorvastatin (LIPITOR) 20 MG tablet Take 20 mg by mouth At Bedtime 9/26/2024 at HS    busPIRone (BUSPAR) 15 MG tablet Take 15 mg by mouth 3 times daily. 9/27/2024 at 1300; 2 of 3    canagliflozin (INVOKANA) 300 MG tablet Take 300 mg by mouth every morning (before breakfast) 9/27/2024 at AM     cariprazine (VRAYLAR) 1.5 MG capsule Take 6 mg by mouth every morning. 9/27/2024 at AM    cloZAPine (CLOZARIL) 50 MG tablet Take 325 mg by mouth at bedtime. 9/26/2024 at HS    famotidine (PEPCID) 20 MG tablet [FAMOTIDINE (PEPCID) 20 MG TABLET] Take 20 mg by mouth 2 (two) times a day. 9/27/2024 at AM; 1 of 2    lamoTRIgine (LAMICTAL) 200 MG tablet Take 200 mg by mouth at bedtime. 9/26/2024    liraglutide (VICTOZA) 18 MG/3ML solution Inject 1.8 mg Subcutaneous daily 9/27/2024 at AM    losartan (COZAAR) 100 MG tablet [LOSARTAN (COZAAR) 100 MG TABLET] Take 100 mg by mouth daily. 9/27/2024 at AM    metFORMIN (GLUCOPHAGE) 1000 MG tablet Take 1,000 mg by mouth 2 times daily (with meals)  9/27/2024 at AM; 1 of 2    metoprolol succinate ER (TOPROL-XL) 200 MG 24 hr tablet Take 200 mg by mouth daily 9/27/2024 at AM    omeprazole (PRILOSEC) 40 MG capsule [OMEPRAZOLE (PRILOSEC) 40 MG CAPSULE] Take 40 mg by mouth 2 (two) times a day. 9/27/2024 at AM; 1 of 2    sertraline (ZOLOFT) 100 MG tablet [SERTRALINE (ZOLOFT) 100 MG TABLET] Take 300 mg by mouth daily. 9/27/2024 at AM    tiZANidine (ZANAFLEX) 2 MG tablet Take 2 mg by mouth At Bedtime 9/26/2024 at HS    traZODone (DESYREL) 50 MG tablet Take 100 mg by mouth at bedtime. 9/26/2024 at HS    trihexyphenidyl (ARTANE) 5 MG tablet Take 5 mg by mouth at bedtime. 9/26/2024 at HS

## 2024-09-28 NOTE — PROGRESS NOTES
Remote cross cover -- chest pain , midsternal    A - R/O ACS, PE, pancreatitis, GERD, PTX/PNA     Plans   - EKG, trops, CMP, lipase, VBG, Ddimer   - Nitroglycerin PRN - cardiac chest pain   - Morphine PRN for severe pain   - Maalox if seems GERD   - Consider MD bedside eval if condition is concerning - RN discretion     Discussed with RN

## 2024-09-28 NOTE — PLAN OF CARE
Problem: Adult Inpatient Plan of Care  Goal: Absence of Hospital-Acquired Illness or Injury  Intervention: Identify and Manage Fall Risk  Recent Flowsheet Documentation  Taken 9/28/2024 1600 by Samuel Reyes RN  Safety Promotion/Fall Prevention:   clutter free environment maintained   lighting adjusted   mobility aid in reach   safety round/check completed   assistive device/personal items within reach   room near nurse's station   nonskid shoes/slippers when out of bed   Goal Outcome Evaluation:      Plan of Care Reviewed With: patient    Overall Patient Progress: improvingOverall Patient Progress: improving  Pt alert and oriented, vss on 2 liters, can voice needs, pt is independent in his room, had a panick attack this afternoon, he requested for something to calm him down, provider paged and hydroxyzine ordered. Later his parents came to visit him which was of great support. He continues to be in isolation for covid.  Pt has a tele reading normal sinus rhythm  but changes to sinus tachy  when he gets anxious.

## 2024-09-28 NOTE — H&P
[facility]     History and Physical - Hospitalist Service        Date of Admission:  9/27/2024     Assessment & Plan       Sunil Rondon is a 39 year old male admitted on 9/27/2024. He has a past medical history of diabetes mellitus type 2, generalized anxiety, bipolar disorder, borderline mental retardation and lives in the same independent group home, borderline personality disorder, depression, reflux, he is affective disorder, sleep apnea, who presents with 2 days of chest pain and urinary frequency urgency, and emesis day prior to arrival, found to be septic, started on ceftriaxone and admitted.  Also found to be positive with COVID though he had a few weeks ago.  Leukocytosis 18.1, lactic acidosis 2.2 now trending to 1.6 after volume resuscitation, troponin is not elevated high-sensitivity. And he is currently not hypoxic.  Will need to monitor for potential progression or hypoxia and also follow blood cultures.  -----    Sepsis, suspected pyelonephritis, dysuria/urgency/frequency consistent with UTI;  covid-19 positive, chest pain, leukocytosis  A- as above; recheck troponin if chest pain returns or worsens, suspect infectious etiology; recently there was a suspicion for a foot infection recently that ultimately appeared to be negative, now has a suspicious looking urinalysis and fortunately CT now resulted without conspicuous kidney involvement, but he does have COVID and unclear it is related to previous episode 3 weeks ago versus a new or recurrent strain that could worsen.  P:  -Admit to the hospital  -Continue ceftriaxone, will do 2 g daily for sepsis and pyelonephritis dosing  -Consider infectious disease input if complicating during hospitalization  -Discussed with the patient potential need for steroids and remdesivir and he will be open to both should he become hypoxic  -Supportive cares  -Follow volume status carefully  - check CRP to trend  - follow blood cultures   --  "follow the in-process urine culture, follow speciation of organism(s) and subsequently their sensitivities and resistance (S&R) and narrow antibiotics as appropriately     diabetes mellitus type 2  A/p- LDSSI and hypoglycemia, will need follow up as poorly controlled  -Hold canagliflozin and liraglutide and metformin     generalized anxiety  bipolar disorder  borderline mental retardation   lives in the same independent group home  borderline personality disorder  Depression  schizoaffective disorder  A/P- appears stable, continue home meds including buspirone and cariprazine and trihexyphenidyl; after medication reconciliation he is appears to no longer be on clozapine    Htn  A/p- resume home meds with hold parameters  - however hold losartan given potential mega if kidney involvement worsens    Reflux  A/p- stable, resume home meds    sleep apnea  A/p- cpap; however he did not tolerate the mask fit in the past           Diet: Moderate Consistent Carb (60 g CHO per Meal) Diet   DVT Prophylaxis: Enoxaparin (Lovenox) SQ  Jaime Catheter: Not present  Lines: None     Cardiac Monitoring: ACTIVE order. Indication: sepsis  Code Status: Full Code     Clinically Significant Risk Factors Present on Admission                  # Hypertension: Noted on problem list         # Severe Obesity: Estimated body mass index is 55.38 kg/m  as calculated from the following:    Height as of this encounter: 1.93 m (6' 4\").    Weight as of this encounter: 206.4 kg (455 lb).              Disposition Plan     Medically Ready for Discharge: Anticipated in 2-4 Days           Lio Mcdonald MD  Hospitalist Service  [facility]  Securely message with [employer] (more info)  Text page via [employer] Paging/Directory     ______________________________________________________________________    Chief Complaint   Burning with urination, chest pain     History is obtained from the patient    History of Present Illness   Sunil Rondon " is a 39 year old male who has a past medical history of diabetes mellitus type 2, generalized anxiety, bipolar disorder, borderline mental retardation and lives in the same independent group home, borderline personality disorder, depression, reflux, he is affective disorder, sleep apnea, who presents with 2 days of chest pain and urinary frequency urgency, and emesis day prior to arrival, found to be septic, started on ceftriaxone and admitted.  Also found to be positive with COVID though he had a few weeks ago.  Leukocytosis 18.1, lactic acidosis 2.2 now trending to 1.6 after volume resuscitation, troponin is not elevated high-sensitivity. And he is currently not hypoxic.  Will need to monitor for potential progression or hypoxia and also follow blood cultures.    On interview, the patient confirms the aforementioned and also reports chest pain is about the same or stable (troponin was <6); he feels it is associated with moving and taking deep breaths; discussed covid and possibility related to prior infection vs new or recurrent process or strain on top of UTI; discussed concern for kidney involvement but did go over CT results with him; he reports no other symptoms noted including no headaches, fevers, chills, no abdominal pain, no diarrhea or constipation, no dysuria, no neurologic changes or sensory changes. The pt is Full code. Tried to call his father twice in the room to update but did not . Discussed with bedside nursing.         Past Medical History    Past Medical History:   Diagnosis Date    Anxiety     Bipolar 1 disorder (H)     Borderline mental retardation     Depression     Diabetes mellitus (H)     Gender identity disorder     GERD (gastroesophageal reflux disease)     Glucose intolerance (impaired glucose tolerance)     Hypertension     Obesity     Panic attacks     Schizoaffective disorder (H)     Sinus tarsi syndrome     Sleep apnea     Suicidal ideation        Past Surgical History   Past  Surgical History:   Procedure Laterality Date    HC KNEE SCOPE,SINGLE MENISECTOMY Left 6/8/2016    Procedure: LEFT KNEE ARTHROSCOPY, PARTIAL MEDIAL MENISCECTOMY, LOOSE BODY REMOVAL, CHONDROPLASTY;  Surgeon: Sky Goodwin MD;  Location: [facility];  Service: Orthopedics    OPEN REDUCTION INTERNAL FIXATION ANKLE Left 12/31/2018    Procedure: OPEN REDUCTION INTERNAL FIXATION, FRACTURE, LEFT ANKLE;  Surgeon: Jarad Mosqueda MD;  Location: [facility];  Service: Orthopedics    TENDON REPAIR Left     wrist    UPPER GASTROINTESTINAL ENDOSCOPY         Prior to Admission Medications   Prior to Admission Medications   Prescriptions Last Dose Informant Patient Reported? Taking?   acetaminophen (TYLENOL) 500 MG tablet Unknown at PRN  No Yes   Sig: Take 2 tablets (1,000 mg) by mouth every 6 hours as needed for pain or fever.   albuterol (PROAIR HFA/PROVENTIL HFA/VENTOLIN HFA) 108 (90 Base) MCG/ACT inhaler Unknown at PRN  Yes Yes   Sig: Inhale 2 puffs into the lungs every 6 hours as needed    amLODIPine (NORVASC) 5 MG tablet 9/27/2024 at AM  No Yes   Sig: [AMLODIPINE (NORVASC) 5 MG TABLET] Take 1 tablet (5 mg total) by mouth daily.   atorvastatin (LIPITOR) 20 MG tablet 9/26/2024 at HS  Yes Yes   Sig: Take 20 mg by mouth At Bedtime   busPIRone (BUSPAR) 15 MG tablet 9/27/2024 at 1300; 2 of 3  Yes Yes   Sig: Take 15 mg by mouth 3 times daily.   canagliflozin (INVOKANA) 300 MG tablet 9/27/2024 at AM  Yes Yes   Sig: Take 300 mg by mouth every morning (before breakfast)   cariprazine (VRAYLAR) 1.5 MG capsule 9/27/2024 at AM  Yes Yes   Sig: Take 6 mg by mouth every morning.   cloZAPine (CLOZARIL) 50 MG tablet 9/26/2024 at HS  Yes Yes   Sig: Take 325 mg by mouth at bedtime.   famotidine (PEPCID) 20 MG tablet 9/27/2024 at AM; 1 of 2  Yes Yes   Sig: [FAMOTIDINE (PEPCID) 20 MG TABLET] Take 20 mg by mouth 2 (two) times a day.   lamoTRIgine (LAMICTAL) 200 MG tablet 9/26/2024  Yes Yes   Sig: Take 200 mg by mouth at bedtime.    liraglutide (VICTOZA) 18 MG/3ML solution 9/27/2024 at AM  Yes Yes   Sig: Inject 1.8 mg Subcutaneous daily   losartan (COZAAR) 100 MG tablet 9/27/2024 at AM  Yes Yes   Sig: [LOSARTAN (COZAAR) 100 MG TABLET] Take 100 mg by mouth daily.   metFORMIN (GLUCOPHAGE) 1000 MG tablet 9/27/2024 at AM; 1 of 2  Yes Yes   Sig: Take 1,000 mg by mouth 2 times daily (with meals)    metoprolol succinate ER (TOPROL-XL) 200 MG 24 hr tablet 9/27/2024 at AM  Yes Yes   Sig: Take 200 mg by mouth daily   omeprazole (PRILOSEC) 40 MG capsule 9/27/2024 at AM; 1 of 2  Yes Yes   Sig: [OMEPRAZOLE (PRILOSEC) 40 MG CAPSULE] Take 40 mg by mouth 2 (two) times a day.   sertraline (ZOLOFT) 100 MG tablet 9/27/2024 at AM  Yes Yes   Sig: [SERTRALINE (ZOLOFT) 100 MG TABLET] Take 300 mg by mouth daily.   tiZANidine (ZANAFLEX) 2 MG tablet 9/26/2024 at HS  Yes Yes   Sig: Take 2 mg by mouth At Bedtime   traZODone (DESYREL) 50 MG tablet 9/26/2024 at HS  Yes Yes   Sig: Take 100 mg by mouth at bedtime.   trihexyphenidyl (ARTANE) 5 MG tablet 9/26/2024 at HS  Yes Yes   Sig: Take 5 mg by mouth at bedtime.      Facility-Administered Medications: None        Review of Systems    The 10 point Review of Systems is negative other than noted in the HPI or here.      Social History   I have reviewed this patient's social history and updated it with pertinent information if needed.  Social History     Tobacco Use    Smoking status: Never    Smokeless tobacco: Never   Substance Use Topics    Alcohol use: No    Drug use: No         Allergies   Allergies   Allergen Reactions    No Clinical Screening - See Comments GI Disturbance and Rash     Cherries and some other fruits         Physical Exam   Vital Signs: Temp: 99.4  F (37.4  C) Temp src: Oral BP: 118/59 Pulse: 107   Resp: 18 SpO2: 92 % O2 Device: None (Room air)    Weight: 455 lbs 0 oz     GENERAL:  Alert, appears comfortable, in no acute distress, appears stated age   HEAD:  Normocephalic, without obvious abnormality,  atraumatic   EYES:  PERRL, conjunctiva/corneas clear, no scleral icterus, EOM's intact   NOSE: Nares normal, septum midline, mucosa normal, no drainage   THROAT: Lips, mucosa, and tongue normal; teeth and gums normal, mouth moist   NECK: Supple, symmetrical, trachea midline   BACK:   Symmetric, no curvature   LUNGS:   Clear to auscultation bilaterally, no rales, rhonchi, or wheezing, symmetric chest rise on inhalation, respirations unlabored, on room air     CHEST WALL:  No tenderness or conspicuous deformity   HEART:  Intermittently tachycardic otherwise regular rate and rhythm, S1 and S2 normal, no murmur, rub, or gallop, no conspicuous jugular venous distention noted, peripheral pulses intact     ABDOMEN:   Soft, non-tender, bowel sounds auscultated in all four quadrants, no masses, no organomegaly, no rebound or guarding   EXTREMITIES: Extremities normal, atraumatic, no cyanosis or edema     SKIN: Dry to touch, no exanthems in the visualized areas or erythema   NEURO: Alert, oriented x3, moves all four extremities of their own volition, strength is 5/5 in 4 limbs     PSYCH: Cooperative and behavior is appropriate       Medical Decision Making       85 MINUTES SPENT BY ME on the date of service doing chart review, history, exam, documentation & further activities per the note.      Data   ------------------------- PAST 24 HR DATA REVIEWED -----------------------------------------------    I have personally reviewed the following data over the past 24 hrs:    18.1 (H)  \   12.0 (L)   / 230     139 101 13.6 /  191 (H)   4.1 20 (L) 0.80 \     Trop: <6 BNP: N/A     Procal: N/A CRP: N/A Lactic Acid: 1.6         Imaging results reviewed over the past 24 hrs:   Recent Results (from the past 24 hour(s))   XR Chest 2 Views    Narrative    EXAM: XR CHEST 2 VIEWS  LOCATION: [facility]  DATE: 9/27/2024    INDICATION: chest pain  COMPARISON: 1/22/2024      Impression    IMPRESSION: Negative chest.    CT Abdomen Pelvis w Contrast    Narrative    EXAM: CT ABDOMEN PELVIS W CONTRAST  LOCATION: [facility]  DATE: 9/27/2024    INDICATION: Sepsis, evaluate for obstructing stone, abscess.  COMPARISON: 3/24/2018.  TECHNIQUE: CT scan of the abdomen and pelvis was performed following injection of IV contrast. Multiplanar reformats were obtained. Dose reduction techniques were used.  CONTRAST: 100 mL Isovue-370.    FINDINGS:   LOWER CHEST: Normal.    HEPATOBILIARY: Normal.    PANCREAS: Normal.    SPLEEN: Normal.    ADRENAL GLANDS: Normal.    KIDNEYS/BLADDER: In the superior pole of the left kidney there is an 11 mm x 11 mm x 13 mm low-density lesion with Hounsfield units at 6 with no associated gas. This would be most typical for a benign cyst and has slightly increased in size since prior   CT 3/24/2028, previous measurements at 7.6 x 6.3 x 5.4 mm. The bladder has no significant urine, thus detail is limited. The remainder of the urinary tract is normal.     BOWEL: Normal to include the appendix.    LYMPH NODES: Normal.    VASCULATURE: Normal.    PELVIC ORGANS: Normal.    MUSCULOSKELETAL: Mild thoracolumbar curvature with associated mild hypertrophic changes.      Impression    IMPRESSION:   1.  Changes most typical for a benign cyst in the left kidney.    2.  Mild thoracolumbar spinal curvature with associated mild hypertrophic changes.

## 2024-09-28 NOTE — PROGRESS NOTES
[facility]    Medicine Progress Note - Hospitalist Service    Date of Admission:  9/27/2024    Assessment & Plan       Sunil Rondon is a 39 year old male admitted on 9/27/2024. He has a past medical history of diabetes mellitus type 2, generalized anxiety, bipolar disorder, borderline mental retardation and lives in the same independent group home, borderline personality disorder, depression, reflux, he is affective disorder, sleep apnea, who presents with 2 days of chest pain and urinary frequency urgency, and emesis day prior to arrival, found to be septic, started on ceftriaxone and admitted.  Also found to be positive with COVID though he had a few weeks ago.  Leukocytosis 18.1, lactic acidosis 2.2 now trending to 1.6 after volume resuscitation, troponin is not elevated high-sensitivity. And he is currently not hypoxic.  Will need to monitor for potential progression or hypoxia and also follow blood cultures.    9/28: pt is now hypoxic requiring 2L of 02 via NC and feels more short of breath; discussed dexamethasone and possibly remdesivir; ordered steroids and checking LFTs prior to starting the latter. Following urine cultures for UTI.     -----  Sepsis, dysuria/urgency/frequency consistent with UTI;  covid-19 positive, chest pain, leukocytosis; now respiratory failure with hypoxia  A- as above in summary, starting covid therapies. Updated his father at-length.  P:   - start decadron 6mg daily   - check LFTs first, then if normal would start remdesivir   -Continue ceftriaxone, initially ordered 2g daily for sepsis and pyelonephritis dosing but no kidney involvement or CVA tenderness and vitals stabilized, so will go back to 1g daily dosing  -Consider infectious disease input if complicating course during hospitalization  -Supportive cares  -Follow volume status carefully  - check CRP to trend  - follow blood cultures   -- follow the in-process urine culture, follow speciation of  "organism(s) and subsequently their sensitivities and resistance (S&R) and narrow antibiotics as appropriately     diabetes mellitus type 2  A/p- LDSSI and hypoglycemia, will need follow up as poorly controlled  -Hold canagliflozin and liraglutide and metformin   -starting steroids, may need to go up on ssi and/or start nph during next few days    generalized anxiety  bipolar disorder  borderline mental retardation   lives in the same independent group home  borderline personality disorder  Depression  schizoaffective disorder  A/P- appears stable, continue home meds including buspirone and cariprazine and trihexyphenidyl; after medication reconciliation he is on a [facility] regimen [facility] at [facility]    Htn  A/p- resumed home meds with hold parameters  - however hold losartan given potential mega if kidney involvement worsens  - normotensive pressures     Reflux  A/p- stable, resume home meds    sleep apnea  A/p- cpap; however he did not tolerate the mask fit in the past; recommend outpt follow up           Diet: Moderate Consistent Carb (60 g CHO per Meal) Diet    DVT Prophylaxis: Enoxaparin (Lovenox) SQ  Jaime Catheter: Not present  Lines: None     Cardiac Monitoring: ACTIVE order. Indication: sepsis  Code Status: Full Code      Clinically Significant Risk Factors Present on Admission                  # Hypertension: Noted on problem list    # Anemia: based on hgb <11       # Severe Obesity: Estimated body mass index is 56.03 kg/m  (pended) as calculated from the following:    Height as of this encounter: 1.93 m (6' 4\").    Weight as of this encounter: (P) 208.8 kg (460 lb 4.8 oz).              Disposition Plan     Medically Ready for Discharge: Anticipated in 2-4 Days         Lio Mcdonald MD  Hospitalist Service  [facility]  Securely message with [employer] (more info)  Text page via [employer] Paging/Directory   ______________________________________________________________________    Interval " History   NAEO  However, pt is now hypoxic requiring 2L of 02 via NC and he also feels more short of breath  We discussed dexamethasone and possibly remdesivir (checking LFTs first for latter)  He asked to update his father; called twice yesterday but did not   - called his father and updated at-length this afternoon on care plans and answered all questions and he expressed gratitude to our care team     Physical Exam   Vital Signs: Temp: 98.2  F (36.8  C) Temp src: Oral BP: 132/77 Pulse: 76   Resp: 18 SpO2: 95 % O2 Device: Nasal cannula Oxygen Delivery: 2 LPM  Weight: 455 lbs 0 oz    General: alert, oriented, and in no acute distress  Pulmonary: coarse throughout, distant breath sounds, coughing with deep inspiration, otherwise no wheezes or rales  CVS: regular rate and rhythm, no murmurs, rubs, or gallops; no blatant jugular venous distention; no extremity edema and extremities are warm to the touch  GI: soft, nontender, BS+, no rebound or guarding, no conspicuous organomegaly   Neuro: nonfocal, moves all extremities of own volition  Psych: appropriate       Medical Decision Making       52 MINUTES SPENT BY ME on the date of service doing chart review, history, exam, documentation & further activities per the note.      Data   ------------------------- PAST 24 HR DATA REVIEWED -----------------------------------------------    I have personally reviewed the following data over the past 24 hrs:    15.1 (H)  \   10.4 (L)   / 192     141 106 12.5 /  104 (H)   3.7 25 0.67 \     ALT: N/A AST: N/A AP: N/A TBILI: N/A   ALB: N/A TOT PROTEIN: N/A LIPASE: 21     Trop: <6 BNP: N/A     Procal: N/A CRP: 191.00 (H) Lactic Acid: 1.6       INR:  N/A PTT:  N/A   D-dimer:  0.28 Fibrinogen:  N/A       Imaging results reviewed over the past 24 hrs:   Recent Results (from the past 24 hour(s))   XR Chest 2 Views    Narrative    EXAM: XR CHEST 2 VIEWS  LOCATION: [facility]  DATE: 9/27/2024    INDICATION:  chest pain  COMPARISON: 1/22/2024      Impression    IMPRESSION: Negative chest.   CT Abdomen Pelvis w Contrast    Narrative    EXAM: CT ABDOMEN PELVIS W CONTRAST  LOCATION: [facility]  DATE: 9/27/2024    INDICATION: Sepsis, evaluate for obstructing stone, abscess.  COMPARISON: 3/24/2018.  TECHNIQUE: CT scan of the abdomen and pelvis was performed following injection of IV contrast. Multiplanar reformats were obtained. Dose reduction techniques were used.  CONTRAST: 100 mL Isovue-370.    FINDINGS:   LOWER CHEST: Normal.    HEPATOBILIARY: Normal.    PANCREAS: Normal.    SPLEEN: Normal.    ADRENAL GLANDS: Normal.    KIDNEYS/BLADDER: In the superior pole of the left kidney there is an 11 mm x 11 mm x 13 mm low-density lesion with Hounsfield units at 6 with no associated gas. This would be most typical for a benign cyst and has slightly increased in size since prior   CT 3/24/2028, previous measurements at 7.6 x 6.3 x 5.4 mm. The bladder has no significant urine, thus detail is limited. The remainder of the urinary tract is normal.     BOWEL: Normal to include the appendix.    LYMPH NODES: Normal.    VASCULATURE: Normal.    PELVIC ORGANS: Normal.    MUSCULOSKELETAL: Mild thoracolumbar curvature with associated mild hypertrophic changes.      Impression    IMPRESSION:   1.  Changes most typical for a benign cyst in the left kidney.    2.  Mild thoracolumbar spinal curvature with associated mild hypertrophic changes.

## 2024-09-28 NOTE — PLAN OF CARE
Problem: Pain Acute  Goal: Optimal Pain Control and Function  Outcome: Progressing     Problem: Comorbidity Management  Goal: Blood Glucose Levels Within Targeted Range  Outcome: Progressing     Pt A&Ox4. Tired and sleepy this shift. C/o not sleeping yesterday night. Denies pain, CP, numbness or tingling, HA, or N/V, endorsed SOB, and tiredness. On 3L NC now to keep sats above 90% and SOB resolved. Up in chair with A1 with walker and gait belt.   C/o HA, PRN Tylenol given  Calls appropriately. Alarms on for pt safety.

## 2024-09-29 LAB
ERYTHROCYTE [DISTWIDTH] IN BLOOD BY AUTOMATED COUNT: 19.1 % (ref 10–15)
GLUCOSE BLDC GLUCOMTR-MCNC: 114 MG/DL (ref 70–99)
GLUCOSE BLDC GLUCOMTR-MCNC: 120 MG/DL (ref 70–99)
GLUCOSE BLDC GLUCOMTR-MCNC: 129 MG/DL (ref 70–99)
GLUCOSE BLDC GLUCOMTR-MCNC: 136 MG/DL (ref 70–99)
GLUCOSE BLDC GLUCOMTR-MCNC: 143 MG/DL (ref 70–99)
HCT VFR BLD AUTO: 37.5 % (ref 40–53)
HGB BLD-MCNC: 11.1 G/DL (ref 13.3–17.7)
MAGNESIUM SERPL-MCNC: 2.2 MG/DL (ref 1.7–2.3)
MCH RBC QN AUTO: 21.8 PG (ref 26.5–33)
MCHC RBC AUTO-ENTMCNC: 29.6 G/DL (ref 31.5–36.5)
MCV RBC AUTO: 74 FL (ref 78–100)
PLATELET # BLD AUTO: 218 10E3/UL (ref 150–450)
POTASSIUM SERPL-SCNC: 4.2 MMOL/L (ref 3.4–5.3)
RBC # BLD AUTO: 5.09 10E6/UL (ref 4.4–5.9)
WBC # BLD AUTO: 9.7 10E3/UL (ref 4–11)

## 2024-09-29 PROCEDURE — 36415 COLL VENOUS BLD VENIPUNCTURE: CPT | Performed by: STUDENT IN AN ORGANIZED HEALTH CARE EDUCATION/TRAINING PROGRAM

## 2024-09-29 PROCEDURE — 250N000013 HC RX MED GY IP 250 OP 250 PS 637: Performed by: INTERNAL MEDICINE

## 2024-09-29 PROCEDURE — 258N000003 HC RX IP 258 OP 636: Performed by: STUDENT IN AN ORGANIZED HEALTH CARE EDUCATION/TRAINING PROGRAM

## 2024-09-29 PROCEDURE — 250N000013 HC RX MED GY IP 250 OP 250 PS 637: Performed by: STUDENT IN AN ORGANIZED HEALTH CARE EDUCATION/TRAINING PROGRAM

## 2024-09-29 PROCEDURE — 120N000001 HC R&B MED SURG/OB

## 2024-09-29 PROCEDURE — 250N000011 HC RX IP 250 OP 636: Performed by: STUDENT IN AN ORGANIZED HEALTH CARE EDUCATION/TRAINING PROGRAM

## 2024-09-29 PROCEDURE — 250N000012 HC RX MED GY IP 250 OP 636 PS 637: Performed by: STUDENT IN AN ORGANIZED HEALTH CARE EDUCATION/TRAINING PROGRAM

## 2024-09-29 PROCEDURE — 85027 COMPLETE CBC AUTOMATED: CPT | Performed by: STUDENT IN AN ORGANIZED HEALTH CARE EDUCATION/TRAINING PROGRAM

## 2024-09-29 PROCEDURE — 84132 ASSAY OF SERUM POTASSIUM: CPT | Performed by: STUDENT IN AN ORGANIZED HEALTH CARE EDUCATION/TRAINING PROGRAM

## 2024-09-29 PROCEDURE — 99232 SBSQ HOSP IP/OBS MODERATE 35: CPT | Performed by: STUDENT IN AN ORGANIZED HEALTH CARE EDUCATION/TRAINING PROGRAM

## 2024-09-29 PROCEDURE — 83735 ASSAY OF MAGNESIUM: CPT | Performed by: STUDENT IN AN ORGANIZED HEALTH CARE EDUCATION/TRAINING PROGRAM

## 2024-09-29 RX ORDER — GUAIFENESIN AND DEXTROMETHORPHAN HYDROBROMIDE 600; 30 MG/1; MG/1
1 TABLET, EXTENDED RELEASE ORAL 2 TIMES DAILY PRN
Status: DISCONTINUED | OUTPATIENT
Start: 2024-09-29 | End: 2024-09-30 | Stop reason: HOSPADM

## 2024-09-29 RX ORDER — GUAIFENESIN 200 MG/10ML
200 LIQUID ORAL EVERY 4 HOURS PRN
Status: DISCONTINUED | OUTPATIENT
Start: 2024-09-29 | End: 2024-09-30 | Stop reason: HOSPADM

## 2024-09-29 RX ORDER — BENZONATATE 100 MG/1
100 CAPSULE ORAL 3 TIMES DAILY
Status: DISCONTINUED | OUTPATIENT
Start: 2024-09-29 | End: 2024-09-30 | Stop reason: HOSPADM

## 2024-09-29 RX ADMIN — CLOZAPINE 325 MG: 100 TABLET ORAL at 21:24

## 2024-09-29 RX ADMIN — HYDROXYZINE HYDROCHLORIDE 50 MG: 25 TABLET ORAL at 20:03

## 2024-09-29 RX ADMIN — LAMOTRIGINE 200 MG: 100 TABLET ORAL at 21:26

## 2024-09-29 RX ADMIN — CARIPRAZINE 6 MG: 1.5 CAPSULE, GELATIN COATED ORAL at 08:55

## 2024-09-29 RX ADMIN — GUAIFENESIN AND DEXTROMETHORPHAN HYDROBROMIDE 1 TABLET: 600; 30 TABLET, EXTENDED RELEASE ORAL at 18:25

## 2024-09-29 RX ADMIN — ACETAMINOPHEN 1000 MG: 500 TABLET ORAL at 21:32

## 2024-09-29 RX ADMIN — REMDESIVIR 100 MG: 100 INJECTION, POWDER, LYOPHILIZED, FOR SOLUTION INTRAVENOUS at 14:42

## 2024-09-29 RX ADMIN — HYDROXYZINE HYDROCHLORIDE 50 MG: 25 TABLET ORAL at 13:07

## 2024-09-29 RX ADMIN — FAMOTIDINE 20 MG: 20 TABLET, FILM COATED ORAL at 08:54

## 2024-09-29 RX ADMIN — BUSPIRONE HYDROCHLORIDE 15 MG: 5 TABLET ORAL at 13:07

## 2024-09-29 RX ADMIN — GUAIFENESIN 200 MG: 100 SOLUTION ORAL at 04:56

## 2024-09-29 RX ADMIN — BENZONATATE 100 MG: 100 CAPSULE ORAL at 08:54

## 2024-09-29 RX ADMIN — PANTOPRAZOLE SODIUM 40 MG: 40 TABLET, DELAYED RELEASE ORAL at 08:54

## 2024-09-29 RX ADMIN — BENZONATATE 100 MG: 100 CAPSULE ORAL at 21:25

## 2024-09-29 RX ADMIN — SENNOSIDES AND DOCUSATE SODIUM 2 TABLET: 50; 8.6 TABLET ORAL at 18:25

## 2024-09-29 RX ADMIN — SERTRALINE 300 MG: 100 TABLET, FILM COATED ORAL at 08:54

## 2024-09-29 RX ADMIN — TRAZODONE HYDROCHLORIDE 100 MG: 50 TABLET ORAL at 21:26

## 2024-09-29 RX ADMIN — ATORVASTATIN CALCIUM 20 MG: 10 TABLET, FILM COATED ORAL at 21:25

## 2024-09-29 RX ADMIN — ENOXAPARIN SODIUM 40 MG: 100 INJECTION SUBCUTANEOUS at 08:55

## 2024-09-29 RX ADMIN — TRIHEXYPHENIDYL HYDROCHLORIDE 5 MG: 2 TABLET ORAL at 20:02

## 2024-09-29 RX ADMIN — BUSPIRONE HYDROCHLORIDE 15 MG: 5 TABLET ORAL at 20:03

## 2024-09-29 RX ADMIN — DEXAMETHASONE 6 MG: 2 TABLET ORAL at 20:04

## 2024-09-29 RX ADMIN — METOPROLOL SUCCINATE 200 MG: 100 TABLET, EXTENDED RELEASE ORAL at 08:54

## 2024-09-29 RX ADMIN — BUSPIRONE HYDROCHLORIDE 15 MG: 5 TABLET ORAL at 08:54

## 2024-09-29 RX ADMIN — SODIUM CHLORIDE 50 ML: 9 INJECTION, SOLUTION INTRAVENOUS at 16:30

## 2024-09-29 RX ADMIN — FAMOTIDINE 20 MG: 20 TABLET, FILM COATED ORAL at 20:04

## 2024-09-29 RX ADMIN — ENOXAPARIN SODIUM 40 MG: 100 INJECTION SUBCUTANEOUS at 21:32

## 2024-09-29 RX ADMIN — BENZONATATE 100 MG: 100 CAPSULE ORAL at 14:42

## 2024-09-29 RX ADMIN — PANTOPRAZOLE SODIUM 40 MG: 40 TABLET, DELAYED RELEASE ORAL at 20:02

## 2024-09-29 RX ADMIN — CEFTRIAXONE 1 G: 1 INJECTION, POWDER, FOR SOLUTION INTRAMUSCULAR; INTRAVENOUS at 18:25

## 2024-09-29 RX ADMIN — BENZOCAINE AND MENTHOL 1 LOZENGE: 15; 3.6 LOZENGE ORAL at 21:27

## 2024-09-29 RX ADMIN — AMLODIPINE BESYLATE 5 MG: 5 TABLET ORAL at 08:54

## 2024-09-29 RX ADMIN — DEXAMETHASONE 6 MG: 2 TABLET ORAL at 08:54

## 2024-09-29 RX ADMIN — GUAIFENESIN 200 MG: 100 SOLUTION ORAL at 14:42

## 2024-09-29 RX ADMIN — TIZANIDINE 2 MG: 2 TABLET ORAL at 21:27

## 2024-09-29 ASSESSMENT — ACTIVITIES OF DAILY LIVING (ADL)
ADLS_ACUITY_SCORE: 26
ADLS_ACUITY_SCORE: 40
ADLS_ACUITY_SCORE: 26
ADLS_ACUITY_SCORE: 40
ADLS_ACUITY_SCORE: 40
ADLS_ACUITY_SCORE: 26
ADLS_ACUITY_SCORE: 40
ADLS_ACUITY_SCORE: 26
ADLS_ACUITY_SCORE: 40
ADLS_ACUITY_SCORE: 26
ADLS_ACUITY_SCORE: 40
ADLS_ACUITY_SCORE: 26
ADLS_ACUITY_SCORE: 26
ADLS_ACUITY_SCORE: 40
ADLS_ACUITY_SCORE: 26
DEPENDENT_IADLS:: CLEANING;MONEY MANAGEMENT;TRANSPORTATION

## 2024-09-29 NOTE — SIGNIFICANT EVENT
Significant Event Note    Time of event: 4:35 AM September 29, 2024    Description of event:  Nursing staff requesting medication for cough for patient.     Plan:  Robitussin ordered.     Discussed with: bedside nurse    Ed Harvey MD

## 2024-09-29 NOTE — CONSULTS
Care Management Initial Consult    General Information  Assessment completed with: Patient,    Type of CM/SW Visit: Initial Assessment    Primary Care Provider verified and updated as needed: Yes   Readmission within the last 30 days:        Reason for Consult: discharge planning  Advance Care Planning:            Communication Assessment  Patient's communication style: spoken language (English or Bilingual)    Hearing Difficulty or Deaf: no   Wear Glasses or Blind: yes    Cognitive  Cognitive/Neuro/Behavioral: WDL                      Living Environment:   People in home: friend(s)     Current living Arrangements: apartment      Able to return to prior arrangements: yes       Family/Social Support:  Care provided by: parent(s)  Provides care for: no one  Marital Status: Single  Support system: Parent(s)          Description of Support System: Supportive, Involved         Current Resources:   Patient receiving home care services: No        Community Resources: County Worker  Equipment currently used at home: none  Supplies currently used at home: None    Employment/Financial:  Employment Status: employed part-time        Financial Concerns: none   Referral to Financial Worker: No       Does the patient's insurance plan have a 3 day qualifying hospital stay waiver?  No    Lifestyle & Psychosocial Needs:  Social Determinants of Health     Food Insecurity: No Food Insecurity (3/2/2023)    Received from [facility], [email or web address]    Food Insecurity     Worried About Running Out of Food in the Last Year: 1   Depression: At risk (3/2/2023)    Received from [facility], [email or web address]    PHQ-2     PHQ-2 TOTAL SCORE: 4   Housing Stability: Low Risk  (3/2/2023)    Received from [facility], [email or web address]    Housing Stability     Unable  to Pay for Housing in the Last Year: 1   Tobacco Use: Low Risk  (9/27/2024)    Patient History     Smoking Tobacco Use: Never     Smokeless Tobacco Use: Never     Passive Exposure: Not on file   Recent Concern: Tobacco Use - Medium Risk (9/10/2024)    [facility]    Patient History     Smoking Tobacco Use: Never     Smokeless Tobacco Use: Never     Passive Exposure: Yes   Financial Resource Strain: Medium Risk (3/2/2023)    Received from [employer] [facility] [employer] [facility], [facility]    Financial Resource Strain     Difficulty of Paying Living Expenses: 2     Difficulty of Paying Living Expenses: 1   Alcohol Use: Not on file   Transportation Needs: No Transportation Needs (3/2/2023)    Received from [employer] [facility] [employer] [facility], [facility]    Transportation Needs     Lack of Transportation (Medical): 1   Physical Activity: Not on file   Interpersonal Safety: Not on file   Stress: Not on file   Social Connections: Socially Integrated (2/14/2024)    [facility]    Social Connections     Frequency of Communication with Friends and Family: 0   Health Literacy: Not on file       Functional Status:  Prior to admission patient needed assistance:   Dependent ADLs:: Independent  Dependent IADLs:: Cleaning, Money Management, Transportation       Mental Health Status:  Mental Health Status: No Current Concerns       Chemical Dependency Status:  Chemical Dependency Status: No Current Concerns             Values/Beliefs:  Spiritual, Cultural Beliefs, Taoism Practices, Values that affect care: no               Discussed  Partnership in Safe Discharge Planning  document with patient/family: Yes:     Additional Information:  SWCM called and spoke with Pt due to isolation precautions. Pt lives at an apartment with a roommate. Apartment is  managed by [facility] [phone].  Pt is on [employer] and CM is Kia SOTELO at [phone].  Pt states that he goes out to get meals and has [employer] meals that are delivered monthly.  Pt does not drive and walks to where he wants to go.  Pt states that he gets food stamps and goes grocery shopping.  Pt has a  that it helpful.  Pt's Dad is also a big support.  Pt states that his dad or Vandanaallyn can take Pt home when ready to discharge.  KYLAH talked with James and can take Pt home if needed.     Next Steps: Contact James and Parents when Pt is medically ready for disharge.     MALIK Guzman

## 2024-09-29 NOTE — PROGRESS NOTES
[facility]    Medicine Progress Note - Hospitalist Service    Date of Admission:  9/27/2024    Assessment & Plan       Sunil Rondon is a 39 year old male admitted on 9/27/2024. He has a past medical history of diabetes mellitus type 2, generalized anxiety, bipolar disorder, borderline mental retardation and lives in the same independent group home, borderline personality disorder, depression, reflux, he is affective disorder, sleep apnea, who presents with 2 days of chest pain and urinary frequency urgency, and emesis day prior to arrival, found to be septic, started on ceftriaxone and admitted.  Also found to be positive with COVID though he had a few weeks ago.  Leukocytosis 18.1, lactic acidosis 2.2 now trending to 1.6 after volume resuscitation, troponin is not elevated high-sensitivity. And he is currently not hypoxic.  Will need to monitor for potential progression or hypoxia and also follow blood cultures.    9/28: pt is now hypoxic requiring 2L of 02 via NC and feels more short of breath; discussed dexamethasone and possibly remdesivir; ordered steroids and checking LFTs prior to starting the latter. Following urine cultures for UTI. Later in day, remdesivir started.     9/29/2024 - pt had panic attack overnight which improved with hydroxyzine. Cultures show Klebsiella pneumoniae R to ampicillin; when nearing discharge would switch to cefdinir to complete at least 7-10 days. Scheduling tessalon; prn mucinex or robitussin.    Barriers to discharge: hypoxia  ----    Sepsis, dysuria/urgency/frequency consistent with UTI;  covid-19 positive, chest pain, leukocytosis; now respiratory failure with hypoxia  A- as above in summary, starting covid therapies. Updated his father at-length.  P:   - decadron 6mg daily   - check LFTs -> normal; started remdesivir 9/28  -Continue ceftriaxone, initially ordered 2g daily for sepsis and pyelonephritis dosing but no kidney involvement or CVA  "tenderness and vitals stabilized, so went back to 1g daily dosing   -Cultures show Klebsiella pneumoniae R to ampicillin; when nearing discharge would switch to cefdinir to complete at least 7-10 days  -Consider infectious disease input if complicating course during hospitalization  -Supportive cares   -Follow volume status carefully  - check CRP to trend -> downward  - follow blood cultures      diabetes mellitus type 2  A/p- LDSSI and hypoglycemia, will need follow up as poorly controlled  -Hold canagliflozin and liraglutide and metformin   -started steroids, may need to go up on ssi and/or start nph during next few days    generalized anxiety  bipolar disorder  borderline mental retardation   lives in the same independent group home  borderline personality disorder  Depression  schizoaffective disorder  A/P- appears stable, continue home meds including buspirone and cariprazine and trihexyphenidyl; after medication reconciliation he is on a different regimen of clozapine at [facility]    Htn  A/p- resumed home meds with hold parameters  - however hold losartan given potential mega if kidney involvement worsens  - normotensive pressures     Reflux  A/p- stable, resume home meds    sleep apnea  A/p- cpap; however he did not tolerate the mask fit in the past; recommend outpt follow up           Diet: Moderate Consistent Carb (60 g CHO per Meal) Diet    DVT Prophylaxis: Enoxaparin (Lovenox) SQ  Jaime Catheter: Not present  Lines: None     Cardiac Monitoring: ACTIVE order. Indication: Tachyarrhythmias, acute (48 hours)  Code Status: Full Code      Clinically Significant Risk Factors                  # Hypertension: Noted on problem list           # Severe Obesity: Estimated body mass index is 56.03 kg/m  (pended) as calculated from the following:    Height as of this encounter: 1.93 m (6' 4\").    Weight as of this encounter: (P) 208.8 kg (460 lb 4.8 oz)., PRESENT ON ADMISSION            Disposition Plan     Medically Ready " for Discharge: Anticipated in 2-4 Days         Lio Mcdonald MD  Hospitalist Service  [facility]  Securely message with [employer] (more info)  Text page via [employer] Paging/Directory   ______________________________________________________________________    Interval History   NAEO  Still on 2L but no new symptoms or concerns  Updated his father yesterday afternoon  Pt has no new Qs or concerns  Discussed hope to wean 02  Checked in w/ RN and updated sw     Physical Exam   Vital Signs: Temp: 98.2  F (36.8  C) Temp src: Oral BP: (!) 172/91 Pulse: 79   Resp: 19 SpO2: 93 % O2 Device: Nasal cannula Oxygen Delivery: 2 LPM  Weight: 455 lbs 0 oz    General: alert, oriented, and in no acute distress, laying on his stomach  Pulmonary: more clear than previously, a bit lower fields posteriorly, did not cough during visit today; no wheezes or rales  CVS: regular rate and rhythm, no murmurs, rubs, or gallops; no blatant jugular venous distention; no extremity edema and extremities are warm to the touch  GI: soft, nontender, BS+, no rebound or guarding, no conspicuous organomegaly   Neuro: nonfocal, moves all extremities of own volition  Psych: appropriate        Medical Decision Making       49 MINUTES SPENT BY ME on the date of service doing chart review, history, exam, documentation & further activities per the note.      Data   ------------------------- PAST 24 HR DATA REVIEWED -----------------------------------------------    I have personally reviewed the following data over the past 24 hrs:    9.7  \   11.1 (L)   / 218     N/A N/A N/A /  114 (H)   4.2 N/A N/A \     ALT: N/A AST: N/A AP: N/A TBILI: N/A   ALB: N/A TOT PROTEIN: N/A LIPASE: N/A       Imaging results reviewed over the past 24 hrs:   No results found for this or any previous visit (from the past 24 hour(s)).

## 2024-09-29 NOTE — PLAN OF CARE
Goal Outcome Evaluation:  Note from [id number]. VSS on 2L O2. Pt reports SOB on exertion. A&Ox4. Pt rated pain 3/10 in chest area. Pt c/o congestion and has productive cough, MD notified, robitussin ordered. No new skin issues noted. Baseline tingling in left foot per pt. Independent for transferring. Left PIV SL. Voiding adequately and passing gas. Education of medication administration and use of call-light to reduce risk for falls and injury. No further issues noted.     Plan of Care Reviewed With: patient  Problem: Adult Inpatient Plan of Care  Goal: Plan of Care Review  Description: The Plan of Care Review/Shift note should be completed every shift.  The Outcome Evaluation is a brief statement about your assessment that the patient is improving, declining, or no change.  This information will be displayed automatically on your shift  note.  Outcome: Progressing  Flowsheets (Taken 9/29/2024 0329)  Plan of Care Reviewed With: patient     Problem: Adult Inpatient Plan of Care  Goal: Optimal Comfort and Wellbeing  Outcome: Progressing  Intervention: Monitor Pain and Promote Comfort  Recent Flowsheet Documentation  Taken 9/29/2024 0045 by Tomasa Ambrosio, RN  Pain Management Interventions: medication (see MAR)     Problem: Adult Inpatient Plan of Care  Goal: Readiness for Transition of Care  Outcome: Progressing

## 2024-09-29 NOTE — PLAN OF CARE
Problem: Adult Inpatient Plan of Care  Goal: Optimal Comfort and Wellbeing  9/29/2024 1146 by Caroline Mcdonald RN  Outcome: Progressing  9/29/2024 1130 by Caroline Mcdonald RN  Outcome: Progressing  Intervention: Monitor Pain and Promote Comfort  Recent Flowsheet Documentation  Taken 9/29/2024 0845 by Caroline Mcdonald RN  Pain Management Interventions:   medication (see MAR)   emotional support     Problem: Pain Acute  Goal: Optimal Pain Control and Function  Intervention: Prevent or Manage Pain  Recent Flowsheet Documentation  Taken 9/29/2024 0845 by Caroline Mcdonald RN  Sensory Stimulation Regulation:   music on   television on  Bowel Elimination Promotion:   adequate fluid intake promoted   ambulation promoted  Medication Review/Management:   medications reviewed   high-risk medications identified     Problem: Comorbidity Management  Goal: Blood Glucose Levels Within Targeted Range  9/29/2024 1146 by Caroline Mcdonald RN  Outcome: Progressing  9/29/2024 1130 by Caroline Mcdonald RN  Outcome: Progressing  Intervention: Monitor and Manage Glycemia  Recent Flowsheet Documentation  Taken 9/29/2024 0845 by Caroline Mcdonald RN  Glycemic Management: blood glucose monitored  Medication Review/Management:   medications reviewed   high-risk medications identified     Pt A&Ox4. VSS on 2L NC. On Covid19 isolation, did not have appetite for breakfast and more sleepy in the morning, but more awake in the afternoon and had lunch. Wean off to 1L NC, sats at 90-93%. C/o anxiety attack, PRN 50mg Hydroxyzine given. Also PRN robitussin for cough. Need SBA/independently up in chair/go to restroom. Voice make needs. Call light within reach.  Alarms on for pt safety.

## 2024-09-29 NOTE — PLAN OF CARE
Goal Outcome Evaluation:      Plan of Care Reviewed With: patient    Overall Patient Progress: improvingOverall Patient Progress: improving    Outcome Evaluation: Pt will return to [facility]

## 2024-09-30 VITALS
WEIGHT: 315 LBS | DIASTOLIC BLOOD PRESSURE: 81 MMHG | OXYGEN SATURATION: 95 % | BODY MASS INDEX: 38.36 KG/M2 | RESPIRATION RATE: 19 BRPM | SYSTOLIC BLOOD PRESSURE: 136 MMHG | HEIGHT: 76 IN | HEART RATE: 57 BPM | TEMPERATURE: 97.5 F

## 2024-09-30 LAB
ATRIAL RATE - MUSE: 92 BPM
CREAT SERPL-MCNC: 0.67 MG/DL (ref 0.67–1.17)
DIASTOLIC BLOOD PRESSURE - MUSE: NORMAL MMHG
EGFRCR SERPLBLD CKD-EPI 2021: >90 ML/MIN/1.73M2
GLUCOSE BLDC GLUCOMTR-MCNC: 127 MG/DL (ref 70–99)
GLUCOSE BLDC GLUCOMTR-MCNC: 131 MG/DL (ref 70–99)
HOLD SPECIMEN: NORMAL
HOLD SPECIMEN: NORMAL
INTERPRETATION ECG - MUSE: NORMAL
MAGNESIUM SERPL-MCNC: 2 MG/DL (ref 1.7–2.3)
P AXIS - MUSE: 63 DEGREES
PLATELET # BLD AUTO: 264 10E3/UL (ref 150–450)
POTASSIUM SERPL-SCNC: 3.9 MMOL/L (ref 3.4–5.3)
PR INTERVAL - MUSE: 186 MS
QRS DURATION - MUSE: 108 MS
QT - MUSE: 392 MS
QTC - MUSE: 484 MS
R AXIS - MUSE: 64 DEGREES
SYSTOLIC BLOOD PRESSURE - MUSE: NORMAL MMHG
T AXIS - MUSE: 34 DEGREES
VENTRICULAR RATE- MUSE: 92 BPM

## 2024-09-30 PROCEDURE — 83735 ASSAY OF MAGNESIUM: CPT | Performed by: STUDENT IN AN ORGANIZED HEALTH CARE EDUCATION/TRAINING PROGRAM

## 2024-09-30 PROCEDURE — 99239 HOSP IP/OBS DSCHRG MGMT >30: CPT | Performed by: STUDENT IN AN ORGANIZED HEALTH CARE EDUCATION/TRAINING PROGRAM

## 2024-09-30 PROCEDURE — 84132 ASSAY OF SERUM POTASSIUM: CPT | Performed by: STUDENT IN AN ORGANIZED HEALTH CARE EDUCATION/TRAINING PROGRAM

## 2024-09-30 PROCEDURE — 82565 ASSAY OF CREATININE: CPT | Performed by: STUDENT IN AN ORGANIZED HEALTH CARE EDUCATION/TRAINING PROGRAM

## 2024-09-30 PROCEDURE — 250N000012 HC RX MED GY IP 250 OP 636 PS 637: Performed by: STUDENT IN AN ORGANIZED HEALTH CARE EDUCATION/TRAINING PROGRAM

## 2024-09-30 PROCEDURE — 250N000013 HC RX MED GY IP 250 OP 250 PS 637: Performed by: STUDENT IN AN ORGANIZED HEALTH CARE EDUCATION/TRAINING PROGRAM

## 2024-09-30 PROCEDURE — 85049 AUTOMATED PLATELET COUNT: CPT | Performed by: STUDENT IN AN ORGANIZED HEALTH CARE EDUCATION/TRAINING PROGRAM

## 2024-09-30 PROCEDURE — 250N000011 HC RX IP 250 OP 636: Performed by: STUDENT IN AN ORGANIZED HEALTH CARE EDUCATION/TRAINING PROGRAM

## 2024-09-30 PROCEDURE — 36415 COLL VENOUS BLD VENIPUNCTURE: CPT | Performed by: STUDENT IN AN ORGANIZED HEALTH CARE EDUCATION/TRAINING PROGRAM

## 2024-09-30 RX ORDER — CEFDINIR 300 MG/1
300 CAPSULE ORAL 2 TIMES DAILY
Qty: 8 CAPSULE | Refills: 0 | Status: SHIPPED | OUTPATIENT
Start: 2024-09-30 | End: 2024-10-04

## 2024-09-30 RX ORDER — CEFDINIR 300 MG/1
300 CAPSULE ORAL 2 TIMES DAILY
Qty: 6 CAPSULE | Refills: 0 | Status: SHIPPED | OUTPATIENT
Start: 2024-09-30 | End: 2024-09-30

## 2024-09-30 RX ORDER — BENZONATATE 100 MG/1
100 CAPSULE ORAL 3 TIMES DAILY
Qty: 15 CAPSULE | Refills: 0 | Status: SHIPPED | OUTPATIENT
Start: 2024-09-30 | End: 2024-10-05

## 2024-09-30 RX ADMIN — ENOXAPARIN SODIUM 40 MG: 100 INJECTION SUBCUTANEOUS at 09:08

## 2024-09-30 RX ADMIN — DEXAMETHASONE 6 MG: 2 TABLET ORAL at 09:07

## 2024-09-30 RX ADMIN — METOPROLOL SUCCINATE 200 MG: 100 TABLET, EXTENDED RELEASE ORAL at 09:07

## 2024-09-30 RX ADMIN — AMLODIPINE BESYLATE 5 MG: 5 TABLET ORAL at 09:07

## 2024-09-30 RX ADMIN — SERTRALINE 300 MG: 100 TABLET, FILM COATED ORAL at 09:06

## 2024-09-30 RX ADMIN — PANTOPRAZOLE SODIUM 40 MG: 40 TABLET, DELAYED RELEASE ORAL at 09:07

## 2024-09-30 RX ADMIN — BUSPIRONE HYDROCHLORIDE 15 MG: 5 TABLET ORAL at 09:07

## 2024-09-30 RX ADMIN — FAMOTIDINE 20 MG: 20 TABLET, FILM COATED ORAL at 09:07

## 2024-09-30 RX ADMIN — BENZONATATE 100 MG: 100 CAPSULE ORAL at 09:06

## 2024-09-30 RX ADMIN — CARIPRAZINE 6 MG: 1.5 CAPSULE, GELATIN COATED ORAL at 09:06

## 2024-09-30 ASSESSMENT — ACTIVITIES OF DAILY LIVING (ADL)
ADLS_ACUITY_SCORE: 26

## 2024-09-30 NOTE — PROGRESS NOTES
Care Management Discharge Note    Discharge Date: 09/30/2024       Discharge Disposition: Home    Discharge Services: County Worker    Discharge DME: None    Discharge Transportation: family or friend will provide, other (see comments) (James)    Private pay costs discussed: Not applicable    Does the patient's insurance plan have a 3 day qualifying hospital stay waiver?  No    PAS Confirmation Code:    Patient/family educated on Medicare website which has current facility and service quality ratings: yes    Education Provided on the Discharge Plan: Yes  Persons Notified of Discharge Plans: yes  Patient/Family in Agreement with the Plan: yes    Handoff Referral Completed: no    Additional Information:  8:53 AM  Pt's father, Andrew will transport pt upon discharge. Andrew has question regarding pt getting his chest X-ray due to covid. CM transferred Andrew to the [address] [place] [address] [facility]. RN to please confirm time frame of  with Andrew when all medical questions are answered. Thank you.     MERI Ortiz

## 2024-09-30 NOTE — PLAN OF CARE
"  Problem: Adult Inpatient Plan of Care  Goal: Absence of Hospital-Acquired Illness or Injury  Intervention: Prevent Skin Injury  Recent Flowsheet Documentation  Taken 9/30/2024 0900 by Bebe Bell RN  Body Position: position changed independently     Problem: Adult Inpatient Plan of Care  Goal: Absence of Hospital-Acquired Illness or Injury  Intervention: Prevent Infection  Recent Flowsheet Documentation  Taken 9/30/2024 0900 by Bebe Bell RN  Infection Prevention:   hand hygiene promoted   rest/sleep promoted   single patient room provided   Goal Outcome Evaluation:         PRIMARY DIAGNOSIS: \"GENERIC\" NURSING  OUTPATIENT/OBSERVATION GOALS TO BE MET BEFORE DISCHARGE:  ADLs back to baseline: Yes    Activity and level of assistance: Ambulating independently.    Pain status: Pain free.    Return to near baseline physical activity: Yes     Discharge Planner Nurse   Safe discharge environment identified: Yes  Barriers to discharge: No       Entered by: Bebe Bell RN 09/30/2024 9:22 AM     Please review provider order for any additional goals.   Nurse to notify provider when observation goals have been met and patient is ready for discharge.        Patient alert and oriented times 4. VSS-RA. Special precaution maintained. Denies pain, chest pains, SOB, numbness or tingling. Diabetic education given for carbohydrate information given. Discharge instructions given and patient states understanding. Father to transport patient home. All belongings returned to patient.          "

## 2024-09-30 NOTE — DISCHARGE SUMMARY
"[facility]  Hospitalist Discharge Summary      Date of Admission:  9/27/2024  Date of Discharge:  9/30/2024 10:14 AM  Discharging Provider: Lio Mcdonald MD  Discharge Service: Hospitalist Service    Discharge Diagnoses   Sepsis, 2/2 UTI  Covid19 infection  Chest pain, resolved  Acute respiratory failure with hypoxia, resolved     Clinically Significant Risk Factors     # Severe Obesity: Estimated body mass index is 54.72 kg/m  as calculated from the following:    Height as of this encounter: 1.93 m (6' 4\").    Weight as of this encounter: 203.9 kg (449 lb 8.3 oz).       Follow-ups Needed After Discharge   Follow-up Appointments     Follow-up and recommended labs and tests       Follow up with primary care provider, Bertrand Valle, within 7 days for   hospital follow- up.             Unresulted Labs Ordered in the Past 30 Days of this Admission       Date and Time Order Name Status Description    9/27/2024  7:02 PM Blood Culture Peripheral Blood Preliminary         These results will be followed up by [facility]     Discharge Disposition   Discharged to home  Condition at discharge: Stable    Hospital Course   Sunil Rondon is a 39 year old male admitted on 9/27/2024. He has a past medical history of diabetes mellitus type 2, generalized anxiety, bipolar disorder, borderline mental retardation and lives in the same independent group home, borderline personality disorder, depression, reflux, he is affective disorder, sleep apnea, who presented with 2 days of chest pain and urinary frequency urgency, and emesis day prior to arrival, found to be septic, started on ceftriaxone and admitted.  Also found to be positive with COVID. Leukocytosis 18.1, lactic acidosis 2.2 trended to 1.6 after volume resuscitation, troponin not elevated high-sensitivity.    Next day became hypoxic requiring 2L of 02 via NC and felt more short of breath; discussed dexamethasone and remdesivir; started after checking LFTs " which were wnl. Pt had panic attack overnight which improved with hydroxyzine.     Cultures show Klebsiella pneumoniae R to ampicillin. Scheduling tessalon helped with coughs. On 9/30 he was weaned to room air overnight and was no longer hypoxic.    As such, he will be discharged back home with cefdinir to complete treatment for UTI and also tessalon for symptoms support and instructed to see PCP within a week for follow up.     Consultations This Hospital Stay   CARE MANAGEMENT / SOCIAL WORK IP CONSULT    Code Status   Prior    Time Spent on this Encounter   I, Lio Mcdonald MD, personally saw the patient today and spent greater than 30 minutes discharging this patient.       Lio Mcdonald MD  [address] [address]  Phone: [phone]  Fax: [phone]  ______________________________________________________________________    Physical Exam   Vital Signs: Temp: 97.5  F (36.4  C) Temp src: Oral BP: 136/81 Pulse: 57   Resp: 19 SpO2: 95 % O2 Device: None (Room air) Oxygen Delivery: 1 LPM  Weight: 449 lbs 8.29 oz     General: alert, oriented, and in no acute distress  Pulmonary: clear to auscultation bilaterally, normal respiratory effort, on room air, no rales or wheezes or evidence of accessory muscle use  CVS: regular rate and rhythm, no murmurs, rubs, or gallops; no blatant jugular venous distention; no extremity edema and extremities are warm to the touch  GI: soft, nontender, BS+, no rebound or guarding, no conspicuous organomegaly   Neuro: nonfocal, moves all extremities of own volition  Psych: appropriate          Primary Care Physician   Bertrand Valle    Discharge Orders      Reason for your hospital stay    Covid19 and Urinary Tract Infection (UTI). Complete 4 more days of twice-daily antibiotics (cefdinir) for the UTI and take the Tessalon Pearles to reduce cough. See your primary care provider for a post-hospital follow up  general visit within a week.     Follow-up and recommended labs and tests     Follow up with primary care provider, Bertrand Valle, within 7 days for hospital follow- up.     Activity    Your activity upon discharge: activity as tolerated     Diet    Follow this diet upon discharge: Current Diet:Orders Placed This Encounter      Moderate Consistent Carb (60 g CHO per Meal) Diet       Significant Results and Procedures   Results for orders placed or performed during the hospital encounter of 09/27/24   XR Chest 2 Views    Narrative    EXAM: XR CHEST 2 VIEWS  LOCATION: [facility]  DATE: 9/27/2024    INDICATION: chest pain  COMPARISON: 1/22/2024      Impression    IMPRESSION: Negative chest.   CT Abdomen Pelvis w Contrast    Narrative    EXAM: CT ABDOMEN PELVIS W CONTRAST  LOCATION: [facility]  DATE: 9/27/2024    INDICATION: Sepsis, evaluate for obstructing stone, abscess.  COMPARISON: 3/24/2018.  TECHNIQUE: CT scan of the abdomen and pelvis was performed following injection of IV contrast. Multiplanar reformats were obtained. Dose reduction techniques were used.  CONTRAST: 100 mL Isovue-370.    FINDINGS:   LOWER CHEST: Normal.    HEPATOBILIARY: Normal.    PANCREAS: Normal.    SPLEEN: Normal.    ADRENAL GLANDS: Normal.    KIDNEYS/BLADDER: In the superior pole of the left kidney there is an 11 mm x 11 mm x 13 mm low-density lesion with Hounsfield units at 6 with no associated gas. This would be most typical for a benign cyst and has slightly increased in size since prior   CT 3/24/2028, previous measurements at 7.6 x 6.3 x 5.4 mm. The bladder has no significant urine, thus detail is limited. The remainder of the urinary tract is normal.     BOWEL: Normal to include the appendix.    LYMPH NODES: Normal.    VASCULATURE: Normal.    PELVIC ORGANS: Normal.    MUSCULOSKELETAL: Mild thoracolumbar curvature with associated mild hypertrophic changes.      Impression     IMPRESSION:   1.  Changes most typical for a benign cyst in the left kidney.    2.  Mild thoracolumbar spinal curvature with associated mild hypertrophic changes.       Discharge Medications   Discharge Medication List as of 9/30/2024  9:29 AM        START taking these medications    Details   benzonatate (TESSALON) 100 MG capsule Take 1 capsule (100 mg) by mouth 3 times daily for 5 days., Disp-15 capsule, R-0, E-Prescribe           CONTINUE these medications which have CHANGED    Details   cefdinir (OMNICEF) 300 MG capsule Take 1 capsule (300 mg) by mouth 2 times daily for 4 days., Disp-8 capsule, R-0, E-Prescribe           CONTINUE these medications which have NOT CHANGED    Details   acetaminophen (TYLENOL) 500 MG tablet Take 2 tablets (1,000 mg) by mouth every 6 hours as needed for pain or fever., Disp-50 tablet, R-0, Local Print      albuterol (PROAIR HFA/PROVENTIL HFA/VENTOLIN HFA) 108 (90 Base) MCG/ACT inhaler Inhale 2 puffs into the lungs every 6 hours as needed , Historical      amLODIPine (NORVASC) 5 MG tablet [AMLODIPINE (NORVASC) 5 MG TABLET] Take 1 tablet (5 mg total) by mouth daily., Disp-30 tablet, R-0, Print      atorvastatin (LIPITOR) 20 MG tablet Take 20 mg by mouth At Bedtime, Historical      busPIRone (BUSPAR) 15 MG tablet Take 15 mg by mouth 3 times daily., Historical      canagliflozin (INVOKANA) 300 MG tablet Take 300 mg by mouth every morning (before breakfast), Historical      cariprazine (VRAYLAR) 1.5 MG capsule Take 6 mg by mouth every morning., Historical      cloZAPine (CLOZARIL) 50 MG tablet Take 325 mg by mouth at bedtime., Historical      famotidine (PEPCID) 20 MG tablet [FAMOTIDINE (PEPCID) 20 MG TABLET] Take 20 mg by mouth 2 (two) times a day., Historical      lamoTRIgine (LAMICTAL) 200 MG tablet Take 200 mg by mouth at bedtime., Historical      liraglutide (VICTOZA) 18 MG/3ML solution Inject 1.8 mg Subcutaneous daily, Historical      losartan (COZAAR) 100 MG tablet [LOSARTAN  (COZAAR) 100 MG TABLET] Take 100 mg by mouth daily., Historical      metFORMIN (GLUCOPHAGE) 1000 MG tablet Take 1,000 mg by mouth 2 times daily (with meals) , Historical      metoprolol succinate ER (TOPROL-XL) 200 MG 24 hr tablet Take 200 mg by mouth daily, Historical      omeprazole (PRILOSEC) 40 MG capsule [OMEPRAZOLE (PRILOSEC) 40 MG CAPSULE] Take 40 mg by mouth 2 (two) times a day., Historical      sertraline (ZOLOFT) 100 MG tablet [SERTRALINE (ZOLOFT) 100 MG TABLET] Take 300 mg by mouth daily., Historical      tiZANidine (ZANAFLEX) 2 MG tablet Take 2 mg by mouth At Bedtime, Historical      traZODone (DESYREL) 50 MG tablet Take 100 mg by mouth at bedtime., Historical      trihexyphenidyl (ARTANE) 5 MG tablet Take 5 mg by mouth at bedtime., Historical           Allergies   Allergies   Allergen Reactions    No Clinical Screening - See Comments GI Disturbance and Rash     Cherries and some other fruits

## 2024-09-30 NOTE — PLAN OF CARE
Problem: Pain Acute  Goal: Optimal Pain Control and Function  Outcome: Progressing     AO x4. VSS on RA. Covid precaution. IV SL. Headache was managed with PRN tylenol. Tele: NSR. Independent in the room. Makes needs known. Call light within reach.    Pt taken to T207 by RN with all belongings.

## 2024-09-30 NOTE — PLAN OF CARE
"Problem: Adult Inpatient Plan of Care  Goal: Absence of Hospital-Acquired Illness or Injury  Intervention: Identify and Manage Fall Risk  Problem: Comorbidity Management  Goal: Maintenance of Behavioral Health Symptom Control  Intervention: Maintain Behavioral Health Symptom Control   Problem: Adult Inpatient Plan of Care  Goal: Optimal Comfort and Wellbeing  Intervention: Monitor Pain and Promote Comfort    Goal Outcome Evaluation:  Patient is alert and oriented X 4. Tele monitor sinus rhythm. Tele tech called and reported a HR of 46. Patient denied pain, SOB, chest pain. MD paged and updated. Per MD, Continue to monitor if patient is asymptomatic. IV saline locked. Patient is independent in room. Call light within reach. /61 (BP Location: Right arm, Patient Position: Left side, Cuff Size: Adult Large)   Pulse 57   Temp 97.8  F (36.6  C) (Oral)   Resp 16   Ht 1.93 m (6' 4\")   Wt (!) 203.9 kg (449 lb 8.3 oz)   SpO2 93%   BMI 54.72 kg/m                            "

## 2024-10-01 ENCOUNTER — PATIENT OUTREACH (OUTPATIENT)
Dept: CARE COORDINATION | Facility: CLINIC | Age: 39
End: 2024-10-01
Payer: MEDICAID

## 2024-10-01 NOTE — PROGRESS NOTES
Clinic Care Coordination Contact  Transitions of Care Outreach    Chief Complaint   Patient presents with    Clinic Care Coordination - Post Hospital       Most Recent Admission Date: 9/27/2024   Most Recent Admission Diagnosis: Pyelonephritis - N12  Sepsis, due to unspecified organism, unspecified whether acute organ dysfunction present (H) - A41.9  COVID-19 - U07.1     Most Recent Discharge Date: 9/30/2024   Most Recent Discharge Diagnosis: Sepsis, due to unspecified organism, unspecified whether acute organ dysfunction present (H) - A41.9  Pyelonephritis - N12  COVID-19 - U07.1  Urinary tract infection without hematuria, site unspecified - N39.0     Transitions of Care Assessment    Discharge Assessment  How are you doing now that you are home?: feeling better  How are your symptoms? (Red Flag symptoms escalate to triage hotline per guidelines): Improved  Do you know how to contact your clinic care team if you have future questions or changes to your health status? : Yes  Does the patient have their discharge instructions? : Yes  Does the patient have questions regarding their discharge instructions? : No  Were you started on any new medications or were there changes to any of your previous medications? : Yes  Does the patient have all of their medications?: Yes  Do you have questions regarding any of your medications? : No  Do you have all of your needed medical supplies or equipment (DME)?  (i.e. oxygen tank, CPAP, cane, etc.): Yes    Post-op ([facility] Only)  If the patient had a surgery or procedure, do they have any questions for a nurse?: No         Follow up Plan     No future appointments.  Outpatient Plan as outlined on AVS reviewed with patient.    Follow-up and recommended labs and tests  Follow up with primary care provider, Bertrand Valle, within 7 days for hospital follow- up.    For any urgent concerns, please contact our 24 hour nurse triage line: [phone]     [place], CHW  [phone]  Connected  [facility]

## 2024-10-02 LAB — BACTERIA BLD CULT: NO GROWTH

## 2024-12-05 ENCOUNTER — HOSPITAL ENCOUNTER (INPATIENT)
Facility: CLINIC | Age: 39
End: 2024-12-05
Payer: MEDICAID

## 2024-12-05 ENCOUNTER — APPOINTMENT (OUTPATIENT)
Dept: CT IMAGING | Facility: CLINIC | Age: 39
End: 2024-12-05
Payer: MEDICAID

## 2024-12-05 VITALS
HEART RATE: 88 BPM | SYSTOLIC BLOOD PRESSURE: 128 MMHG | RESPIRATION RATE: 18 BRPM | BODY MASS INDEX: 38.36 KG/M2 | TEMPERATURE: 98.1 F | WEIGHT: 315 LBS | HEIGHT: 76 IN | OXYGEN SATURATION: 94 % | DIASTOLIC BLOOD PRESSURE: 58 MMHG

## 2024-12-05 DIAGNOSIS — N39.0 COMPLICATED UTI (URINARY TRACT INFECTION): ICD-10-CM

## 2024-12-05 DIAGNOSIS — I10 PRIMARY HYPERTENSION: ICD-10-CM

## 2024-12-05 DIAGNOSIS — R65.20 SEVERE SEPSIS (H): ICD-10-CM

## 2024-12-05 DIAGNOSIS — A41.9 SEVERE SEPSIS (H): ICD-10-CM

## 2024-12-05 DIAGNOSIS — N12 PYELONEPHRITIS: Primary | ICD-10-CM

## 2024-12-05 DIAGNOSIS — M79.10 MUSCLE PAIN: ICD-10-CM

## 2024-12-05 LAB
ALBUMIN SERPL BCG-MCNC: 4.3 G/DL (ref 3.5–5.2)
ALBUMIN UR-MCNC: 50 MG/DL
ALP SERPL-CCNC: 108 U/L (ref 40–150)
ALT SERPL W P-5'-P-CCNC: 37 U/L (ref 0–70)
ANION GAP SERPL CALCULATED.3IONS-SCNC: 11 MMOL/L (ref 7–15)
ANION GAP SERPL CALCULATED.3IONS-SCNC: 15 MMOL/L (ref 7–15)
APPEARANCE UR: CLEAR
AST SERPL W P-5'-P-CCNC: 37 U/L (ref 0–45)
BASOPHILS # BLD AUTO: 0.1 10E3/UL (ref 0–0.2)
BASOPHILS NFR BLD AUTO: 1 %
BILIRUB SERPL-MCNC: 0.2 MG/DL
BILIRUB UR QL STRIP: NEGATIVE
BUN SERPL-MCNC: 16.7 MG/DL (ref 6–20)
BUN SERPL-MCNC: 17 MG/DL (ref 6–20)
CALCIUM SERPL-MCNC: 9.3 MG/DL (ref 8.8–10.4)
CALCIUM SERPL-MCNC: 9.5 MG/DL (ref 8.8–10.4)
CHLORIDE SERPL-SCNC: 104 MMOL/L (ref 98–107)
CHLORIDE SERPL-SCNC: 105 MMOL/L (ref 98–107)
COLOR UR AUTO: YELLOW
CREAT SERPL-MCNC: 0.88 MG/DL (ref 0.67–1.17)
CREAT SERPL-MCNC: 0.99 MG/DL (ref 0.67–1.17)
EGFRCR SERPLBLD CKD-EPI 2021: >90 ML/MIN/1.73M2
EGFRCR SERPLBLD CKD-EPI 2021: >90 ML/MIN/1.73M2
EOSINOPHIL # BLD AUTO: 0.1 10E3/UL (ref 0–0.7)
EOSINOPHIL NFR BLD AUTO: 1 %
ERYTHROCYTE [DISTWIDTH] IN BLOOD BY AUTOMATED COUNT: 19.8 % (ref 10–15)
GLUCOSE BLDC GLUCOMTR-MCNC: 166 MG/DL (ref 70–99)
GLUCOSE BLDC GLUCOMTR-MCNC: 97 MG/DL (ref 70–99)
GLUCOSE SERPL-MCNC: 116 MG/DL (ref 70–99)
GLUCOSE SERPL-MCNC: 98 MG/DL (ref 70–99)
GLUCOSE UR STRIP-MCNC: 30 MG/DL
HCO3 SERPL-SCNC: 22 MMOL/L (ref 22–29)
HCO3 SERPL-SCNC: 23 MMOL/L (ref 22–29)
HCT VFR BLD AUTO: 41.9 % (ref 40–53)
HGB BLD-MCNC: 12.5 G/DL (ref 13.3–17.7)
HGB UR QL STRIP: NEGATIVE
HYALINE CASTS: 4 /LPF
IMM GRANULOCYTES # BLD: 0.1 10E3/UL
IMM GRANULOCYTES NFR BLD: 1 %
KETONES UR STRIP-MCNC: NEGATIVE MG/DL
LACTATE SERPL-SCNC: 1.5 MMOL/L (ref 0.7–2)
LACTATE SERPL-SCNC: 2.4 MMOL/L (ref 0.7–2)
LEUKOCYTE ESTERASE UR QL STRIP: ABNORMAL
LIPASE SERPL-CCNC: 49 U/L (ref 13–60)
LYMPHOCYTES # BLD AUTO: 1.8 10E3/UL (ref 0.8–5.3)
LYMPHOCYTES NFR BLD AUTO: 15 %
MCH RBC QN AUTO: 21.7 PG (ref 26.5–33)
MCHC RBC AUTO-ENTMCNC: 29.8 G/DL (ref 31.5–36.5)
MCV RBC AUTO: 73 FL (ref 78–100)
MONOCYTES # BLD AUTO: 1.4 10E3/UL (ref 0–1.3)
MONOCYTES NFR BLD AUTO: 11 %
MUCOUS THREADS #/AREA URNS LPF: PRESENT /LPF
NEUTROPHILS # BLD AUTO: 8.8 10E3/UL (ref 1.6–8.3)
NEUTROPHILS NFR BLD AUTO: 73 %
NITRATE UR QL: NEGATIVE
NRBC # BLD AUTO: 0 10E3/UL
NRBC BLD AUTO-RTO: 0 /100
PH UR STRIP: 5.5 [PH] (ref 5–7)
PLATELET # BLD AUTO: 304 10E3/UL (ref 150–450)
POTASSIUM SERPL-SCNC: 4.1 MMOL/L (ref 3.4–5.3)
POTASSIUM SERPL-SCNC: 4.4 MMOL/L (ref 3.4–5.3)
PROT SERPL-MCNC: 7.7 G/DL (ref 6.4–8.3)
RBC # BLD AUTO: 5.76 10E6/UL (ref 4.4–5.9)
RBC URINE: 1 /HPF
SODIUM SERPL-SCNC: 139 MMOL/L (ref 135–145)
SODIUM SERPL-SCNC: 141 MMOL/L (ref 135–145)
SP GR UR STRIP: 1.03 (ref 1–1.03)
SQUAMOUS EPITHELIAL: <1 /HPF
UROBILINOGEN UR STRIP-MCNC: <2 MG/DL
WBC # BLD AUTO: 12.2 10E3/UL (ref 4–11)
WBC URINE: 5 /HPF

## 2024-12-05 PROCEDURE — 120N000001 HC R&B MED SURG/OB

## 2024-12-05 PROCEDURE — 74177 CT ABD & PELVIS W/CONTRAST: CPT

## 2024-12-05 PROCEDURE — 86140 C-REACTIVE PROTEIN: CPT | Performed by: INTERNAL MEDICINE

## 2024-12-05 PROCEDURE — 250N000011 HC RX IP 250 OP 636: Performed by: EMERGENCY MEDICINE

## 2024-12-05 PROCEDURE — 83690 ASSAY OF LIPASE: CPT

## 2024-12-05 PROCEDURE — 82947 ASSAY GLUCOSE BLOOD QUANT: CPT

## 2024-12-05 PROCEDURE — 250N000013 HC RX MED GY IP 250 OP 250 PS 637: Performed by: EMERGENCY MEDICINE

## 2024-12-05 PROCEDURE — 82435 ASSAY OF BLOOD CHLORIDE: CPT

## 2024-12-05 PROCEDURE — 83605 ASSAY OF LACTIC ACID: CPT

## 2024-12-05 PROCEDURE — 82962 GLUCOSE BLOOD TEST: CPT

## 2024-12-05 PROCEDURE — 36415 COLL VENOUS BLD VENIPUNCTURE: CPT

## 2024-12-05 PROCEDURE — 85014 HEMATOCRIT: CPT

## 2024-12-05 PROCEDURE — 99222 1ST HOSP IP/OBS MODERATE 55: CPT | Performed by: EMERGENCY MEDICINE

## 2024-12-05 PROCEDURE — 85025 COMPLETE CBC W/AUTO DIFF WBC: CPT

## 2024-12-05 PROCEDURE — 36415 COLL VENOUS BLD VENIPUNCTURE: CPT | Performed by: EMERGENCY MEDICINE

## 2024-12-05 PROCEDURE — 258N000003 HC RX IP 258 OP 636

## 2024-12-05 PROCEDURE — 258N000003 HC RX IP 258 OP 636: Performed by: EMERGENCY MEDICINE

## 2024-12-05 PROCEDURE — 82374 ASSAY BLOOD CARBON DIOXIDE: CPT | Performed by: EMERGENCY MEDICINE

## 2024-12-05 PROCEDURE — 84460 ALANINE AMINO (ALT) (SGPT): CPT

## 2024-12-05 PROCEDURE — 250N000011 HC RX IP 250 OP 636

## 2024-12-05 PROCEDURE — 87040 BLOOD CULTURE FOR BACTERIA: CPT

## 2024-12-05 PROCEDURE — 250N000013 HC RX MED GY IP 250 OP 250 PS 637

## 2024-12-05 PROCEDURE — 250N000013 HC RX MED GY IP 250 OP 250 PS 637: Performed by: STUDENT IN AN ORGANIZED HEALTH CARE EDUCATION/TRAINING PROGRAM

## 2024-12-05 PROCEDURE — 81001 URINALYSIS AUTO W/SCOPE: CPT

## 2024-12-05 PROCEDURE — 87086 URINE CULTURE/COLONY COUNT: CPT

## 2024-12-05 PROCEDURE — 82310 ASSAY OF CALCIUM: CPT | Performed by: EMERGENCY MEDICINE

## 2024-12-05 RX ORDER — TRIHEXYPHENIDYL HYDROCHLORIDE 2 MG/1
5 TABLET ORAL AT BEDTIME
Status: DISCONTINUED | OUTPATIENT
Start: 2024-12-05 | End: 2024-12-05

## 2024-12-05 RX ORDER — LAMOTRIGINE 100 MG/1
200 TABLET ORAL AT BEDTIME
Status: DISCONTINUED | OUTPATIENT
Start: 2024-12-05 | End: 2024-12-07 | Stop reason: HOSPADM

## 2024-12-05 RX ORDER — ACETAMINOPHEN 650 MG/1
650 SUPPOSITORY RECTAL EVERY 4 HOURS PRN
Status: DISCONTINUED | OUTPATIENT
Start: 2024-12-05 | End: 2024-12-07 | Stop reason: HOSPADM

## 2024-12-05 RX ORDER — SULFAMETHOXAZOLE AND TRIMETHOPRIM 800; 160 MG/1; MG/1
1 TABLET ORAL 2 TIMES DAILY
Status: ON HOLD | COMMUNITY
Start: 2024-11-29 | End: 2024-12-07

## 2024-12-05 RX ORDER — FAMOTIDINE 20 MG/1
20 TABLET, FILM COATED ORAL 2 TIMES DAILY
Status: DISCONTINUED | OUTPATIENT
Start: 2024-12-05 | End: 2024-12-07 | Stop reason: HOSPADM

## 2024-12-05 RX ORDER — KETOROLAC TROMETHAMINE 15 MG/ML
15 INJECTION, SOLUTION INTRAMUSCULAR; INTRAVENOUS ONCE
Status: COMPLETED | OUTPATIENT
Start: 2024-12-05 | End: 2024-12-05

## 2024-12-05 RX ORDER — TRIHEXYPHENIDYL HYDROCHLORIDE 2 MG/1
1 TABLET ORAL AT BEDTIME
Status: DISCONTINUED | OUTPATIENT
Start: 2024-12-05 | End: 2024-12-07 | Stop reason: HOSPADM

## 2024-12-05 RX ORDER — SERTRALINE HYDROCHLORIDE 100 MG/1
300 TABLET, FILM COATED ORAL DAILY
Status: DISCONTINUED | OUTPATIENT
Start: 2024-12-06 | End: 2024-12-07 | Stop reason: HOSPADM

## 2024-12-05 RX ORDER — CLOZAPINE 25 MG/1
12.5 TABLET ORAL 2 TIMES DAILY
Status: DISCONTINUED | OUTPATIENT
Start: 2024-12-05 | End: 2024-12-05

## 2024-12-05 RX ORDER — NICOTINE POLACRILEX 4 MG
15-30 LOZENGE BUCCAL
Status: DISCONTINUED | OUTPATIENT
Start: 2024-12-05 | End: 2024-12-07 | Stop reason: HOSPADM

## 2024-12-05 RX ORDER — ATORVASTATIN CALCIUM 10 MG/1
20 TABLET, FILM COATED ORAL AT BEDTIME
Status: DISCONTINUED | OUTPATIENT
Start: 2024-12-05 | End: 2024-12-07 | Stop reason: HOSPADM

## 2024-12-05 RX ORDER — CEFTRIAXONE 1 G/1
1 INJECTION, POWDER, FOR SOLUTION INTRAMUSCULAR; INTRAVENOUS ONCE
Status: COMPLETED | OUTPATIENT
Start: 2024-12-05 | End: 2024-12-05

## 2024-12-05 RX ORDER — AMLODIPINE BESYLATE 5 MG/1
5 TABLET ORAL DAILY
Status: DISCONTINUED | OUTPATIENT
Start: 2024-12-06 | End: 2024-12-07 | Stop reason: HOSPADM

## 2024-12-05 RX ORDER — LOSARTAN POTASSIUM 50 MG/1
100 TABLET ORAL DAILY
Status: DISCONTINUED | OUTPATIENT
Start: 2024-12-06 | End: 2024-12-07 | Stop reason: HOSPADM

## 2024-12-05 RX ORDER — BUSPIRONE HYDROCHLORIDE 10 MG/1
30 TABLET ORAL AT BEDTIME
Status: DISCONTINUED | OUTPATIENT
Start: 2024-12-05 | End: 2024-12-07 | Stop reason: HOSPADM

## 2024-12-05 RX ORDER — CLOZAPINE 25 MG/1
12.5 TABLET ORAL DAILY
Status: DISCONTINUED | OUTPATIENT
Start: 2024-12-05 | End: 2024-12-05

## 2024-12-05 RX ORDER — CEFTRIAXONE 2 G/1
2 INJECTION, POWDER, FOR SOLUTION INTRAMUSCULAR; INTRAVENOUS EVERY 24 HOURS
Status: DISCONTINUED | OUTPATIENT
Start: 2024-12-06 | End: 2024-12-07 | Stop reason: HOSPADM

## 2024-12-05 RX ORDER — TRAZODONE HYDROCHLORIDE 100 MG/1
100 TABLET ORAL AT BEDTIME
Status: DISCONTINUED | OUTPATIENT
Start: 2024-12-05 | End: 2024-12-07 | Stop reason: HOSPADM

## 2024-12-05 RX ORDER — PANTOPRAZOLE SODIUM 40 MG/1
40 TABLET, DELAYED RELEASE ORAL 2 TIMES DAILY
Status: DISCONTINUED | OUTPATIENT
Start: 2024-12-05 | End: 2024-12-07 | Stop reason: HOSPADM

## 2024-12-05 RX ORDER — IOPAMIDOL 755 MG/ML
100 INJECTION, SOLUTION INTRAVASCULAR ONCE
Status: COMPLETED | OUTPATIENT
Start: 2024-12-05 | End: 2024-12-05

## 2024-12-05 RX ORDER — METOPROLOL SUCCINATE 100 MG/1
200 TABLET, EXTENDED RELEASE ORAL DAILY
Status: DISCONTINUED | OUTPATIENT
Start: 2024-12-06 | End: 2024-12-06

## 2024-12-05 RX ORDER — SODIUM CHLORIDE 9 MG/ML
INJECTION, SOLUTION INTRAVENOUS CONTINUOUS
Status: DISCONTINUED | OUTPATIENT
Start: 2024-12-05 | End: 2024-12-07

## 2024-12-05 RX ORDER — ACETAMINOPHEN 325 MG/1
650 TABLET ORAL EVERY 4 HOURS PRN
Status: DISCONTINUED | OUTPATIENT
Start: 2024-12-05 | End: 2024-12-07 | Stop reason: HOSPADM

## 2024-12-05 RX ORDER — TRIHEXYPHENIDYL HYDROCHLORIDE 2 MG/1
4 TABLET ORAL AT BEDTIME
Status: DISCONTINUED | OUTPATIENT
Start: 2024-12-05 | End: 2024-12-07 | Stop reason: HOSPADM

## 2024-12-05 RX ORDER — BUSPIRONE HYDROCHLORIDE 15 MG/1
30 TABLET ORAL AT BEDTIME
COMMUNITY

## 2024-12-05 RX ORDER — ACETAMINOPHEN 325 MG/1
975 TABLET ORAL ONCE
Status: COMPLETED | OUTPATIENT
Start: 2024-12-05 | End: 2024-12-05

## 2024-12-05 RX ORDER — DEXTROSE MONOHYDRATE 25 G/50ML
25-50 INJECTION, SOLUTION INTRAVENOUS
Status: DISCONTINUED | OUTPATIENT
Start: 2024-12-05 | End: 2024-12-07 | Stop reason: HOSPADM

## 2024-12-05 RX ADMIN — ATORVASTATIN CALCIUM 20 MG: 10 TABLET, FILM COATED ORAL at 21:46

## 2024-12-05 RX ADMIN — BUSPIRONE HYDROCHLORIDE 30 MG: 10 TABLET ORAL at 21:46

## 2024-12-05 RX ADMIN — CLOZAPINE 325 MG: 100 TABLET ORAL at 21:46

## 2024-12-05 RX ADMIN — TRAZODONE HYDROCHLORIDE 100 MG: 100 TABLET ORAL at 21:46

## 2024-12-05 RX ADMIN — TRIHEXYPHENIDYL HYDROCHLORIDE 4 MG: 2 TABLET ORAL at 21:57

## 2024-12-05 RX ADMIN — CEFTRIAXONE 1 G: 1 INJECTION, POWDER, FOR SOLUTION INTRAMUSCULAR; INTRAVENOUS at 17:10

## 2024-12-05 RX ADMIN — ACETAMINOPHEN 975 MG: 325 TABLET ORAL at 16:06

## 2024-12-05 RX ADMIN — FAMOTIDINE 20 MG: 20 TABLET, FILM COATED ORAL at 20:12

## 2024-12-05 RX ADMIN — CEFTRIAXONE 1 G: 1 INJECTION, POWDER, FOR SOLUTION INTRAMUSCULAR; INTRAVENOUS at 20:08

## 2024-12-05 RX ADMIN — SODIUM CHLORIDE 1000 ML: 9 INJECTION, SOLUTION INTRAVENOUS at 17:11

## 2024-12-05 RX ADMIN — SODIUM CHLORIDE: 9 INJECTION, SOLUTION INTRAVENOUS at 20:03

## 2024-12-05 RX ADMIN — TIZANIDINE 4 MG: 4 TABLET ORAL at 21:46

## 2024-12-05 RX ADMIN — IOPAMIDOL 100 ML: 755 INJECTION, SOLUTION INTRAVENOUS at 16:56

## 2024-12-05 RX ADMIN — TRIHEXYPHENIDYL HYDROCHLORIDE 1 MG: 2 TABLET ORAL at 21:57

## 2024-12-05 RX ADMIN — ACETAMINOPHEN 650 MG: 325 TABLET ORAL at 21:46

## 2024-12-05 RX ADMIN — LAMOTRIGINE 200 MG: 100 TABLET ORAL at 21:46

## 2024-12-05 RX ADMIN — KETOROLAC TROMETHAMINE 15 MG: 15 INJECTION, SOLUTION INTRAMUSCULAR; INTRAVENOUS at 16:07

## 2024-12-05 ASSESSMENT — COLUMBIA-SUICIDE SEVERITY RATING SCALE - C-SSRS
6. HAVE YOU EVER DONE ANYTHING, STARTED TO DO ANYTHING, OR PREPARED TO DO ANYTHING TO END YOUR LIFE?: NO
1. IN THE PAST MONTH, HAVE YOU WISHED YOU WERE DEAD OR WISHED YOU COULD GO TO SLEEP AND NOT WAKE UP?: NO
1. IN THE PAST MONTH, HAVE YOU WISHED YOU WERE DEAD OR WISHED YOU COULD GO TO SLEEP AND NOT WAKE UP?: YES
2. HAVE YOU ACTUALLY HAD ANY THOUGHTS OF KILLING YOURSELF IN THE PAST MONTH?: NO

## 2024-12-05 ASSESSMENT — ACTIVITIES OF DAILY LIVING (ADL)
ADLS_ACUITY_SCORE: 55

## 2024-12-05 NOTE — ED TRIAGE NOTES
PT is coming in tonight with RT sided flank pain that started about 5 days ago. Seen PCP and was given an RX of bactrim but did not start his ABX til this morning.     HX: Many UTI over the past few months.      Triage Assessment (Adult)       Row Name 12/05/24 [id number]          Triage Assessment    Airway WDL WDL        Respiratory WDL    Respiratory WDL WDL        Skin Circulation/Temperature WDL    Skin Circulation/Temperature WDL WDL        Peripheral/Neurovascular WDL    Peripheral Neurovascular WDL WDL        Cognitive/Neuro/Behavioral WDL    Cognitive/Neuro/Behavioral WDL WDL

## 2024-12-05 NOTE — ED PROVIDER NOTES
EMERGENCY DEPARTMENT ENCOUNTER      NAME: Sunil Rondon  AGE: 39 year old male  YOB: 1985  MRN: [id number]  EVALUATION DATE & TIME: No admission date for patient encounter.    PCP: Bertrand Valle    ED PROVIDER: Trent Buchanan MD      Chief Complaint   Patient presents with    Flank Pain         FINAL IMPRESSION:  1. Severe sepsis (H)    2. Complicated UTI (urinary tract infection)          ED COURSE & MEDICAL DECISION MAKING:    Pertinent Labs & Imaging studies reviewed. (See chart for details)  39 year old male presents to the Emergency Department for evaluation of flank pain.  Differential diagnosis considered Appendicitis, hernia, urinary tract infection, pyelonephritis, constipation, diverticulitis, testicular torsion, epididymitis, orchitis, Frank's gangrene, Aortic dissection, ACS, myocardial infarction, ascending cholangitis, cholecystitis, biliary colic, choledocholithiasis, pancreatitis, pneumonia, perforated viscus, peptic ulcer disease, gastritis pulmonary embolism.     Triage Note: PT is coming in tonight with RT sided flank pain that started about 5 days ago. Seen PCP and was given an RX of bactrim but did not start his ABX til this morning.     HX: Many UTI over the past few months.       ED Course as of 12/05/24 1848   Thu Dec 05, 2024   1537 On chart review patient's had recurrent UTIs most recent urine test on 11/26 and had an abnormal urine culture with greater than 100,000 Klebsiella pneumonia.  This was pansensitive.   1540 39-year-old male with morbid obesity, schizoaffective disorder, mental retardation, adjustment disorder, depression, hypertension, obstructive sleep apnea presenting with right flank pain. Patient was seen on 11/29 for urinary symptoms, right lower quadrant and right flank pain.  He is seen by his primary care provider.  Presumed UTI at that time and was prescribed Bactrim.  He took 1 dose today and has not taken medication before today.  He is tachycardic  otherwise he medically stable and is diaphoretic.  Does have right lower quadrant pain and right CVA tenderness.  Concern for intra-abdominal pathology including appendicitis, pyelonephritis or kidney stone.  Will obtain labs, symptomatically treat and reassess.  Also will obtain a CT scan with contrast to rule out intra-abdominal pathology.   1611 Lactic was 2.4 given his tachycardia, leukocytosis concern for sepsis.  He qualifies for sepsis with organ dysfunction given his elevated lactate to 2.4  Likely from a urinary source.  Reviewed past urine cultures which have been greater than 100,000 Klebsiella that have been pansensitive.  Previously treated with ceftriaxone with improvement.  Ordered blood cultures.  Will give ceftriaxone.   1701 UA with Microscopic reflex to Culture(!)  UA  did not show sign of infection however he has been on antibiotics.   1739 Reevaluate patient at bedside.  He is receiving IV fluids and antibiotics.  I do anticipate admission given sepsis.   1847 CT Abdomen Pelvis w Contrast  IMPRESSION:   1.  No acute findings in the abdomen and pelvis     1847 Overall presentation is concerning for sepsis with organ dysfunction likely from a urinary source.  CT abdomen did not show signs of kidney stone, appendicitis, bowel obstruction.  I reevaluated his skin and he did not have signs of external infection.  I was able to use  exam he did not have evidence of Frank's gangrene.   He denied testicular pain or swelling and I have low concern for testicular torsion epididymitis or orchitis.       Not Applicable    I discussed the patient with the hospitalist, Dr. Hendrickson, who accepts the patient for admission.     At the conclusion of the encounter I discussed the results of all of the tests and the disposition. The questions were answered. The patient or family acknowledged understanding and was agreeable with the care plan.     MEDICATIONS GIVEN IN THE EMERGENCY:  Medications   acetaminophen  (TYLENOL) tablet 975 mg (975 mg Oral $Given 12/5/24 1606)   ketorolac (TORADOL) injection 15 mg (15 mg Intravenous $Given 12/5/24 1607)   cefTRIAXone (ROCEPHIN) 1 g vial to attach to  mL bag for ADULTS or NS 50 mL bag for PEDS (0 g Intravenous Stopped 12/5/24 5458)   sodium chloride 0.9% BOLUS 1,000 mL (1,000 mLs Intravenous $New Bag 12/5/24 1711)   iopamidol (ISOVUE-370) solution 100 mL (100 mLs Intravenous $Given 12/5/24 1656)       NEW PRESCRIPTIONS STARTED AT TODAY'S ER VISIT  New Prescriptions    No medications on file     Modified Medications    No medications on file       =================================================================    HPI    Patient information was obtained from: patient    Use of : N/A         Sunil Rondon is a 39 year old male with a pertinent history of UTI who presents to this ED for evaluation of flank pain.    Patient reports on about 4 days ago (12/1/2024), he saw his PCP for right sided flank pain and dysuria. He was then prescribed Bactrim on 12/2/2024 but did not start taking it until this morning (1 dose). He notes since being on the antibiotics, symptoms have not improved and pain has now radiated to the front of his RLQ and into his groin. Describes the pain as a constant 6/10, aching pain, but a 10/10 with movement/walking. He also associates dysuria after voiding. Due to symptoms not improving, he decided to come into the ED for evaluation. He did take tylenol last night.     He otherwise denies associating nausea, vomiting, diarrhea, and decrease in appetite or liquid intake. There were no other concerns/complaints at this time.    Per chart review:  11/26/2024: Patient was seen at [facility] PCP for UTI. Patient reported dysuria, low back pain, and some penile pain. Patient was last treated for UTI on 11/5/2024. Urine culture showed infection and he was advised to stop Invokana, starting taking Bactrim, and follow up in 2-4 weeks to ensure  "his infection was cleared      PHYSICAL EXAM    /66   Pulse 112   Temp 98.1  F (36.7  C) (Oral)   Resp 20   Ht 1.93 m (6' 4\")   Wt (!) 207.3 kg (457 lb)   SpO2 94%   BMI 55.63 kg/m    Constitutional: Well developed, well nourished. Diaphoretic  Head: Normocephalic, atraumatic, mucous membranes moist, nose normal.   Neck: Supple, gross ROM intact.   Eyes: Pupils mid-range, sclera white.  Respiratory: Clear to auscultation bilaterally, no respiratory distress, no wheezing, speaks full sentences easily.  Cardiovascular: Normal heart rate, regular rhythm, no murmurs. No lower extremity edema.   GI: Soft, mild RLQ and right CVA tenderness. Obese abdomen.  Musculoskeletal: Moving all 4 extremities intentionally and without pain. No obvious deformity.  Skin: Warm, dry, no rash, erythema, crepitus on the abdomen   Neurologic: Alert & oriented x 3, speech clear, moving all extremities spontaneously   Psychiatric: Affect normal, cooperative.       LAB:  All pertinent labs reviewed and interpreted.  Results for orders placed or performed during the hospital encounter of 12/05/24   CT Abdomen Pelvis w Contrast    Impression    IMPRESSION:   1.  No acute findings in the abdomen and pelvis.   Comprehensive metabolic panel   Result Value Ref Range    Sodium 141 135 - 145 mmol/L    Potassium 4.4 3.4 - 5.3 mmol/L    Carbon Dioxide (CO2) 22 22 - 29 mmol/L    Anion Gap 15 7 - 15 mmol/L    Urea Nitrogen 16.7 6.0 - 20.0 mg/dL    Creatinine 0.99 0.67 - 1.17 mg/dL    GFR Estimate >90 >60 mL/min/1.73m2    Calcium 9.5 8.8 - 10.4 mg/dL    Chloride 104 98 - 107 mmol/L    Glucose 116 (H) 70 - 99 mg/dL    Alkaline Phosphatase 108 40 - 150 U/L    AST 37 0 - 45 U/L    ALT 37 0 - 70 U/L    Protein Total 7.7 6.4 - 8.3 g/dL    Albumin 4.3 3.5 - 5.2 g/dL    Bilirubin Total 0.2 <=1.2 mg/dL   Result Value Ref Range    Lipase 49 13 - 60 U/L   Lactic Acid Whole Blood with 1X Repeat in 2 HR when >2   Result Value Ref Range    Lactic Acid, " Initial 2.4 (H) 0.7 - 2.0 mmol/L   UA with Microscopic reflex to Culture    Specimen: Urine, Midstream   Result Value Ref Range    Color Urine Yellow Colorless, Straw, Light Yellow, Yellow    Appearance Urine Clear Clear    Glucose Urine 30 (A) Negative mg/dL    Bilirubin Urine Negative Negative    Ketones Urine Negative Negative mg/dL    Specific Gravity Urine 1.030 1.001 - 1.030    Blood Urine Negative Negative    pH Urine 5.5 5.0 - 7.0    Protein Albumin Urine 50 (A) Negative mg/dL    Urobilinogen Urine <2.0 <2.0 mg/dL    Nitrite Urine Negative Negative    Leukocyte Esterase Urine 75 Sonny/uL (A) Negative    Mucus Urine Present (A) None Seen /LPF    RBC Urine 1 <=2 /HPF    WBC Urine 5 <=5 /HPF    Squamous Epithelials Urine <1 <=1 /HPF    Hyaline Casts Urine 4 (H) <=2 /LPF   CBC with platelets and differential   Result Value Ref Range    WBC Count 12.2 (H) 4.0 - 11.0 10e3/uL    RBC Count 5.76 4.40 - 5.90 10e6/uL    Hemoglobin 12.5 (L) 13.3 - 17.7 g/dL    Hematocrit 41.9 40.0 - 53.0 %    MCV 73 (L) 78 - 100 fL    MCH 21.7 (L) 26.5 - 33.0 pg    MCHC 29.8 (L) 31.5 - 36.5 g/dL    RDW 19.8 (H) 10.0 - 15.0 %    Platelet Count 304 150 - 450 10e3/uL    % Neutrophils 73 %    % Lymphocytes 15 %    % Monocytes 11 %    % Eosinophils 1 %    % Basophils 1 %    % Immature Granulocytes 1 %    NRBCs per 100 WBC 0 <1 /100    Absolute Neutrophils 8.8 (H) 1.6 - 8.3 10e3/uL    Absolute Lymphocytes 1.8 0.8 - 5.3 10e3/uL    Absolute Monocytes 1.4 (H) 0.0 - 1.3 10e3/uL    Absolute Eosinophils 0.1 0.0 - 0.7 10e3/uL    Absolute Basophils 0.1 0.0 - 0.2 10e3/uL    Absolute Immature Granulocytes 0.1 <=0.4 10e3/uL    Absolute NRBCs 0.0 10e3/uL       RADIOLOGY:  Reviewed all pertinent imaging. Please see official radiology report.  CT Abdomen Pelvis w Contrast   Final Result   IMPRESSION:    1.  No acute findings in the abdomen and pelvis.          EKG:    None    PROCEDURES:   Critical Care  Performed by: Trent Buchanan MD  Authorized by: Trent  MD Dewayne    Total critical care time: 35 minutes  Critical care time was exclusive of separately billable procedures and treating other patients.  Critical care was necessary to treat or prevent imminent or life-threatening deterioration of the following conditions: Severe sepsis, antibiotics, fluid boluses, elevated lactate  Critical care was time spent personally by me on the following activities: development of treatment plan with patient or surrogate, discussions with consultants, examination of patient, evaluation of patient's response to treatment, obtaining history from patient or surrogate, ordering and performing treatments and interventions, ordering and review of laboratory studies, ordering and review of radiographic studies and re-evaluation of patient's condition, this excludes any separately billable procedures.        Trent Buchanan MD  [facility] EMERGENCY ROOM  [facility] [facility] [address] [phone]   =================================================================    BILLING:  Data  Category 1  Non-ED record review, if applicable. External record reviewed: Reviewed recent office visit [facility] PCP 11/26/2024     Clinical information was obtained from an independent historian. History was obtained from: Patient     The following testing was considered but ultimately not selected after discussion with patient/family: N/A     Category 2  My independent interpretation of EKG, rhythm strip, radiology study: CT abdomen and pelvis did not reveal large AAA     Category 3  Discussion of management with other physician/healthcare provider/other source: Spoke to hospitalist regarding patient's ED course and agrees with admission to the hospital        Risk  Prescription medication was considered, but ultimately not given after discussion with patient/family: N/A     Chronic conditions affecting care: Diabetes, mental retardation, severe obesity       Care significantly affected by Social Determinants of Health: N/A     Consideration of Admission/Observation: Admitted to hospital       I, Maria G Levy, am serving as a scribe to document services personally performed by Trent Buchanan MD based on my observation and the provider's statements to me. I, Trent Buchanan MD, attest that Maria G Levy is acting in a scribe capacity, has observed my performance of the services and has documented them in accordance with my direction.     Trent Buchanan MD  12/05/24 5132

## 2024-12-06 LAB
BACTERIA UR CULT: NO GROWTH
CRP SERPL-MCNC: 13.1 MG/L
ERYTHROCYTE [DISTWIDTH] IN BLOOD BY AUTOMATED COUNT: 19.3 % (ref 10–15)
GLUCOSE BLDC GLUCOMTR-MCNC: 115 MG/DL (ref 70–99)
GLUCOSE BLDC GLUCOMTR-MCNC: 122 MG/DL (ref 70–99)
GLUCOSE BLDC GLUCOMTR-MCNC: 162 MG/DL (ref 70–99)
GLUCOSE BLDC GLUCOMTR-MCNC: 99 MG/DL (ref 70–99)
HCT VFR BLD AUTO: 39.2 % (ref 40–53)
HGB BLD-MCNC: 11.7 G/DL (ref 13.3–17.7)
MCH RBC QN AUTO: 21.4 PG (ref 26.5–33)
MCHC RBC AUTO-ENTMCNC: 29.8 G/DL (ref 31.5–36.5)
MCV RBC AUTO: 72 FL (ref 78–100)
PLATELET # BLD AUTO: 233 10E3/UL (ref 150–450)
RBC # BLD AUTO: 5.47 10E6/UL (ref 4.4–5.9)
WBC # BLD AUTO: 9.9 10E3/UL (ref 4–11)

## 2024-12-06 PROCEDURE — 82962 GLUCOSE BLOOD TEST: CPT

## 2024-12-06 PROCEDURE — 99232 SBSQ HOSP IP/OBS MODERATE 35: CPT | Performed by: INTERNAL MEDICINE

## 2024-12-06 PROCEDURE — 250N000013 HC RX MED GY IP 250 OP 250 PS 637: Performed by: INTERNAL MEDICINE

## 2024-12-06 PROCEDURE — 250N000013 HC RX MED GY IP 250 OP 250 PS 637: Performed by: STUDENT IN AN ORGANIZED HEALTH CARE EDUCATION/TRAINING PROGRAM

## 2024-12-06 PROCEDURE — 36415 COLL VENOUS BLD VENIPUNCTURE: CPT | Performed by: INTERNAL MEDICINE

## 2024-12-06 PROCEDURE — 85014 HEMATOCRIT: CPT | Performed by: INTERNAL MEDICINE

## 2024-12-06 PROCEDURE — 250N000013 HC RX MED GY IP 250 OP 250 PS 637: Performed by: EMERGENCY MEDICINE

## 2024-12-06 PROCEDURE — 258N000003 HC RX IP 258 OP 636: Performed by: EMERGENCY MEDICINE

## 2024-12-06 PROCEDURE — 120N000001 HC R&B MED SURG/OB

## 2024-12-06 PROCEDURE — 250N000011 HC RX IP 250 OP 636: Performed by: EMERGENCY MEDICINE

## 2024-12-06 PROCEDURE — 250N000013 HC RX MED GY IP 250 OP 250 PS 637

## 2024-12-06 RX ORDER — POLYETHYLENE GLYCOL 3350 17 G/17G
17 POWDER, FOR SOLUTION ORAL 2 TIMES DAILY
Status: DISCONTINUED | OUTPATIENT
Start: 2024-12-06 | End: 2024-12-07 | Stop reason: HOSPADM

## 2024-12-06 RX ORDER — AMOXICILLIN 250 MG
2 CAPSULE ORAL 2 TIMES DAILY
Status: DISCONTINUED | OUTPATIENT
Start: 2024-12-06 | End: 2024-12-07 | Stop reason: HOSPADM

## 2024-12-06 RX ORDER — HYDROXYZINE HYDROCHLORIDE 25 MG/1
25 TABLET, FILM COATED ORAL 3 TIMES DAILY PRN
Status: DISCONTINUED | OUTPATIENT
Start: 2024-12-06 | End: 2024-12-07 | Stop reason: HOSPADM

## 2024-12-06 RX ORDER — METOPROLOL SUCCINATE 100 MG/1
100 TABLET, EXTENDED RELEASE ORAL DAILY
Status: DISCONTINUED | OUTPATIENT
Start: 2024-12-06 | End: 2024-12-07 | Stop reason: HOSPADM

## 2024-12-06 RX ADMIN — HYDROXYZINE HYDROCHLORIDE 25 MG: 25 TABLET ORAL at 20:04

## 2024-12-06 RX ADMIN — CARIPRAZINE 6 MG: 1.5 CAPSULE, GELATIN COATED ORAL at 08:57

## 2024-12-06 RX ADMIN — PANTOPRAZOLE SODIUM 40 MG: 40 TABLET, DELAYED RELEASE ORAL at 08:23

## 2024-12-06 RX ADMIN — FAMOTIDINE 20 MG: 20 TABLET, FILM COATED ORAL at 08:23

## 2024-12-06 RX ADMIN — TRIHEXYPHENIDYL HYDROCHLORIDE 1 MG: 2 TABLET ORAL at 22:06

## 2024-12-06 RX ADMIN — PANTOPRAZOLE SODIUM 40 MG: 40 TABLET, DELAYED RELEASE ORAL at 19:57

## 2024-12-06 RX ADMIN — HYDROXYZINE HYDROCHLORIDE 25 MG: 25 TABLET ORAL at 08:57

## 2024-12-06 RX ADMIN — CLOZAPINE 325 MG: 100 TABLET ORAL at 22:04

## 2024-12-06 RX ADMIN — TIZANIDINE 4 MG: 4 TABLET ORAL at 21:30

## 2024-12-06 RX ADMIN — BUSPIRONE HYDROCHLORIDE 30 MG: 10 TABLET ORAL at 21:30

## 2024-12-06 RX ADMIN — LAMOTRIGINE 200 MG: 100 TABLET ORAL at 22:05

## 2024-12-06 RX ADMIN — BUSPIRONE HYDROCHLORIDE 15 MG: 10 TABLET ORAL at 08:23

## 2024-12-06 RX ADMIN — ACETAMINOPHEN 650 MG: 325 TABLET ORAL at 13:38

## 2024-12-06 RX ADMIN — ATORVASTATIN CALCIUM 20 MG: 10 TABLET, FILM COATED ORAL at 21:30

## 2024-12-06 RX ADMIN — SERTRALINE 300 MG: 100 TABLET, FILM COATED ORAL at 08:57

## 2024-12-06 RX ADMIN — HYDROXYZINE HYDROCHLORIDE 25 MG: 25 TABLET ORAL at 13:38

## 2024-12-06 RX ADMIN — SODIUM CHLORIDE: 9 INJECTION, SOLUTION INTRAVENOUS at 09:34

## 2024-12-06 RX ADMIN — SENNOSIDES AND DOCUSATE SODIUM 2 TABLET: 50; 8.6 TABLET ORAL at 19:57

## 2024-12-06 RX ADMIN — FAMOTIDINE 20 MG: 20 TABLET, FILM COATED ORAL at 19:58

## 2024-12-06 RX ADMIN — DICLOFENAC SODIUM 2 G: 10 GEL TOPICAL at 19:57

## 2024-12-06 RX ADMIN — DICLOFENAC SODIUM 2 G: 10 GEL TOPICAL at 14:56

## 2024-12-06 RX ADMIN — TRAZODONE HYDROCHLORIDE 100 MG: 100 TABLET ORAL at 21:30

## 2024-12-06 RX ADMIN — TRIHEXYPHENIDYL HYDROCHLORIDE 4 MG: 2 TABLET ORAL at 22:05

## 2024-12-06 RX ADMIN — POLYETHYLENE GLYCOL 3350 17 G: 17 POWDER, FOR SOLUTION ORAL at 19:57

## 2024-12-06 RX ADMIN — CEFTRIAXONE SODIUM 2 G: 2 INJECTION, POWDER, FOR SOLUTION INTRAMUSCULAR; INTRAVENOUS at 20:01

## 2024-12-06 RX ADMIN — ACETAMINOPHEN 650 MG: 325 TABLET ORAL at 08:22

## 2024-12-06 RX ADMIN — BUSPIRONE HYDROCHLORIDE 15 MG: 10 TABLET ORAL at 13:53

## 2024-12-06 RX ADMIN — PANTOPRAZOLE SODIUM 40 MG: 40 TABLET, DELAYED RELEASE ORAL at 00:14

## 2024-12-06 ASSESSMENT — ACTIVITIES OF DAILY LIVING (ADL)
ADLS_ACUITY_SCORE: 57
ADLS_ACUITY_SCORE: 56
ADLS_ACUITY_SCORE: 57

## 2024-12-06 ASSESSMENT — COLUMBIA-SUICIDE SEVERITY RATING SCALE - C-SSRS
1. IN THE PAST MONTH, HAVE YOU WISHED YOU WERE DEAD OR WISHED YOU COULD GO TO SLEEP AND NOT WAKE UP?: YES
2. HAVE YOU ACTUALLY HAD ANY THOUGHTS OF KILLING YOURSELF IN THE PAST MONTH?: NO
6. HAVE YOU EVER DONE ANYTHING, STARTED TO DO ANYTHING, OR PREPARED TO DO ANYTHING TO END YOUR LIFE?: NO

## 2024-12-06 NOTE — MEDICATION SCRIBE - ADMISSION MEDICATION HISTORY
Medication Scribe Admission Medication History    Admission medication history is complete. The information provided in this note is only as accurate as the sources available at the time of the update.    Information Source(s): Caregiver, Clinic records, Hospital records, and CareEverywhere/SureScripts via in-person and on the phone.     Pertinent Information: Spoke with the patient's home health/behavioral health nurse on the phone to determine medication history. Patient reported taking AM and noon/afternoon doses PTA.     Patient was recently prescribed SMZ-TMP DS for a UTI which was filled a few days ago but did not start this medication until this morning. Recently removed canagliflozin this week.     Confirmed clozapine dose is 325 mg at bedtime.   11/5 ANC 6600 per microliter  12/05 ANC 8800 per microliter    Changes made to PTA medication list:  Added:   Sulfamethoxazole-trimethoprim 800-160 mg  Deleted:   Canagliflozin 300 mg  Changed:   Buspirone 15 mg TID --> 15 mg AM and noon, 30 mg at bedtime   Tizanidine 2 mg at bedtime --> 4 mg at bedtime   Trazodone 2 x 50 mg at bedtime --> 100 mg at bedtime     Allergies reviewed with patient and updates made in EHR: yes    Medication History Completed By: Jericho Hawley 12/5/2024 7:05 PM    PTA Med List   Medication Sig Note Last Dose/Taking    acetaminophen (TYLENOL) 500 MG tablet Take 2 tablets (1,000 mg) by mouth every 6 hours as needed for pain or fever.  Past Week    albuterol (PROAIR HFA/PROVENTIL HFA/VENTOLIN HFA) 108 (90 Base) MCG/ACT inhaler Inhale 2 puffs into the lungs every 6 hours as needed   Past Month    amLODIPine (NORVASC) 5 MG tablet [AMLODIPINE (NORVASC) 5 MG TABLET] Take 1 tablet (5 mg total) by mouth daily.  12/5/2024 Morning    atorvastatin (LIPITOR) 20 MG tablet Take 20 mg by mouth At Bedtime  12/4/2024 Bedtime    busPIRone (BUSPAR) 15 MG tablet Take 30 mg by mouth at bedtime. (15 mg QAM/midday)  12/4/2024 Bedtime    busPIRone (BUSPAR) 15  MG tablet Take 15 mg by mouth 2 times daily. AM and midday/afternoon (30 mg at bedtime)  12/5/2024 Noon    cariprazine (VRAYLAR) 1.5 MG capsule Take 6 mg by mouth every morning.  12/5/2024 Morning    cloZAPine (CLOZARIL) 50 MG tablet Take 325 mg by mouth at bedtime. 12/5/2024: (3 x 100 mg tabs + 25 mg tab) 12/4/2024 Bedtime    famotidine (PEPCID) 20 MG tablet [FAMOTIDINE (PEPCID) 20 MG TABLET] Take 20 mg by mouth 2 (two) times a day.  12/5/2024 Morning    lamoTRIgine (LAMICTAL) 200 MG tablet Take 200 mg by mouth at bedtime.  12/4/2024 Bedtime    liraglutide (VICTOZA) 18 MG/3ML solution Inject 1.8 mg Subcutaneous daily  12/5/2024 Morning    losartan (COZAAR) 100 MG tablet [LOSARTAN (COZAAR) 100 MG TABLET] Take 100 mg by mouth daily.  12/5/2024 Morning    metFORMIN (GLUCOPHAGE) 1000 MG tablet Take 1,000 mg by mouth 2 times daily (with meals)   12/5/2024 Morning    metoprolol succinate ER (TOPROL-XL) 200 MG 24 hr tablet Take 200 mg by mouth daily  12/5/2024 Morning    omeprazole (PRILOSEC) 40 MG capsule [OMEPRAZOLE (PRILOSEC) 40 MG CAPSULE] Take 40 mg by mouth 2 (two) times a day.  12/5/2024 Morning    sertraline (ZOLOFT) 100 MG tablet [SERTRALINE (ZOLOFT) 100 MG TABLET] Take 300 mg by mouth daily.  12/5/2024 Morning    sulfamethoxazole-trimethoprim (BACTRIM DS) 800-160 MG tablet Take 1 tablet by mouth 2 times daily. For 10 days. Started 12/05/24 AM  Taking    tiZANidine (ZANAFLEX) 4 MG tablet Take 4 mg by mouth at bedtime.  12/4/2024 Bedtime    traZODone (DESYREL) 100 MG tablet Take 100 mg by mouth at bedtime.  12/4/2024 Bedtime    trihexyphenidyl (ARTANE) 5 MG tablet Take 5 mg by mouth at bedtime.  12/4/2024 Bedtime

## 2024-12-06 NOTE — H&P
[facility] MEDICINE ADMISSION HISTORY AND PHYSICAL     Assessment & Plan        39 year old male into [facility] on 12/5/2024 after arriving to the hospital  For right sided flank pain.  Pt has a history of recurrent UTI for unclear reasons.  He was given a bactrim script on 12/2 for the same symptom. He has taken  Only 1 dose.   Patient had a negative urine culture on November 5.  He had a Klebsiella urine culture on October 8 pansensitive.    PMHx includes mental retardation, morbid obesity, borderline personality disorder, mood issues with depression, developmental delay, diabetes mellitus type 2, GERD, metabolic syndrome, mild intermittent asthma, schizoaffective, self-injurious behavior, BETHEL    Diagnostics on arrival showed normal electrolytes and creatinine.  He has a white count of 12 and hemoglobin of 12.  The MCV is 73.  Urinalysis is positive for leukocyte esterase though no white cells.  The original lactic acid is 2.4.  A repeat is 1.5.  The hemodynamics do not and display signs of sepsis.  CT abdomen pelvis with contrast was reassuring.    On interview the patient is awake alert oriented.  He has clear developmental delay.  He complains of right flank pain.    Hospital medicine service was called for sepsis.  He possibly had early signs of same though on my assessment and interview I do not feel this is active.    He would be admitted under observation for a  complicated UTI antibiotics, IV fluids and monitoring.      Problem list:     UTI, complicated with early sepsis: Rocephin 2 g IV, urine culture, maintenance IV fluid  Diabetes mellitus type 2 non-insulin-dependent: Continue metformin, fingersticks,  Hypertension, essential: Continue Norvasc, losartan  Mood issues: Continue Zoloft, trazodone, clozapine, Tori keith, BuSpar;   Polypharmacy: Patient is at risk for anticholinergic side effects, discussed with pharmacy who will review the case tomorrow  Morbid obesity  with BETHEL, mild intermittant asthma: nighttime pulse ox   Metabolic syndrome with hepatic steatosis:    DVT prevention:  ambulate  Disposition:  Medically Ready for Discharge: Anticipated Tomorrow         Chief Complaint  Right flank pain     HISTORY     Past Medical History     Past Medical History:  No date: Anxiety  No date: Bipolar 1 disorder (H)  No date: Borderline mental retardation  No date: Depression  No date: Diabetes mellitus (H)  No date: Gender identity disorder  No date: GERD (gastroesophageal reflux disease)  No date: Glucose intolerance (impaired glucose tolerance)  No date: Hypertension  No date: Obesity  No date: Panic attacks  No date: Schizoaffective disorder (H)  No date: Sinus tarsi syndrome  No date: Sleep apnea  No date: Suicidal ideation     Surgical History     Past Surgical History:   Procedure Laterality Date    HC KNEE SCOPE,SINGLE MENISECTOMY Left 6/8/2016    Procedure: LEFT KNEE ARTHROSCOPY, PARTIAL MEDIAL MENISCECTOMY, LOOSE BODY REMOVAL, CHONDROPLASTY;  Surgeon: Sky Goodwin MD;  Location: [facility];  Service: Orthopedics    OPEN REDUCTION INTERNAL FIXATION ANKLE Left 12/31/2018    Procedure: OPEN REDUCTION INTERNAL FIXATION, FRACTURE, LEFT ANKLE;  Surgeon: Jarad Mosqueda MD;  Location: [facility];  Service: Orthopedics    TENDON REPAIR Left     wrist    UPPER GASTROINTESTINAL ENDOSCOPY       Family History    Reviewed, and No family history on file.     Social History      Social History     Tobacco Use    Smoking status: Never    Smokeless tobacco: Never   Substance Use Topics    Alcohol use: No    Drug use: No        Allergies     Allergies   Allergen Reactions    No Clinical Screening - See Comments GI Disturbance and Rash     Cherries and some other fruits     Prior to Admission Medications      Prior to Admission Medications   Prescriptions Last Dose Informant Patient Reported? Taking?   acetaminophen (TYLENOL) 500 MG tablet Past Week  No Yes   Sig: Take 2  tablets (1,000 mg) by mouth every 6 hours as needed for pain or fever.   albuterol (PROAIR HFA/PROVENTIL HFA/VENTOLIN HFA) 108 (90 Base) MCG/ACT inhaler Past Month  Yes Yes   Sig: Inhale 2 puffs into the lungs every 6 hours as needed    amLODIPine (NORVASC) 5 MG tablet 12/5/2024 Morning  No Yes   Sig: [AMLODIPINE (NORVASC) 5 MG TABLET] Take 1 tablet (5 mg total) by mouth daily.   atorvastatin (LIPITOR) 20 MG tablet 12/4/2024 Bedtime  Yes Yes   Sig: Take 20 mg by mouth At Bedtime   busPIRone (BUSPAR) 15 MG tablet 12/5/2024 Noon  Yes Yes   Sig: Take 15 mg by mouth 2 times daily. AM and midday/afternoon (30 mg at bedtime)   busPIRone (BUSPAR) 15 MG tablet 12/4/2024 Bedtime  Yes Yes   Sig: Take 30 mg by mouth at bedtime. (15 mg QAM/midday)   cariprazine (VRAYLAR) 1.5 MG capsule 12/5/2024 Morning  Yes Yes   Sig: Take 6 mg by mouth every morning.   cloZAPine (CLOZARIL) 50 MG tablet 12/4/2024 Bedtime  Yes Yes   Sig: Take 325 mg by mouth at bedtime.   famotidine (PEPCID) 20 MG tablet 12/5/2024 Morning  Yes Yes   Sig: [FAMOTIDINE (PEPCID) 20 MG TABLET] Take 20 mg by mouth 2 (two) times a day.   lamoTRIgine (LAMICTAL) 200 MG tablet 12/4/2024 Bedtime  Yes Yes   Sig: Take 200 mg by mouth at bedtime.   liraglutide (VICTOZA) 18 MG/3ML solution 12/5/2024 Morning  Yes Yes   Sig: Inject 1.8 mg Subcutaneous daily   losartan (COZAAR) 100 MG tablet 12/5/2024 Morning  Yes Yes   Sig: [LOSARTAN (COZAAR) 100 MG TABLET] Take 100 mg by mouth daily.   metFORMIN (GLUCOPHAGE) 1000 MG tablet 12/5/2024 Morning  Yes Yes   Sig: Take 1,000 mg by mouth 2 times daily (with meals)    metoprolol succinate ER (TOPROL-XL) 200 MG 24 hr tablet 12/5/2024 Morning  Yes Yes   Sig: Take 200 mg by mouth daily   omeprazole (PRILOSEC) 40 MG capsule 12/5/2024 Morning  Yes Yes   Sig: [OMEPRAZOLE (PRILOSEC) 40 MG CAPSULE] Take 40 mg by mouth 2 (two) times a day.   sertraline (ZOLOFT) 100 MG tablet 12/5/2024 Morning  Yes Yes   Sig: [SERTRALINE (ZOLOFT) 100 MG TABLET]  Take 300 mg by mouth daily.   sulfamethoxazole-trimethoprim (BACTRIM DS) 800-160 MG tablet   Yes Yes   Sig: Take 1 tablet by mouth 2 times daily. For 10 days. Started 12/05/24 AM   tiZANidine (ZANAFLEX) 4 MG tablet 12/4/2024 Bedtime  Yes Yes   Sig: Take 4 mg by mouth at bedtime.   traZODone (DESYREL) 100 MG tablet 12/4/2024 Bedtime  Yes Yes   Sig: Take 100 mg by mouth at bedtime.   trihexyphenidyl (ARTANE) 5 MG tablet 12/4/2024 Bedtime  Yes Yes   Sig: Take 5 mg by mouth at bedtime.      Facility-Administered Medications: None      Review of Systems     A 12 point comprehensive review of systems was negative except as noted above in HPI.    PHYSICAL EXAMINATION       Vitals      Temp:  [98.1  F (36.7  C)] 98.1  F (36.7  C)  Pulse:  [112] 112  Resp:  [17-20] 17  BP: (122-134)/(66-83) 134/83  SpO2:  [94 %-96 %] 96 %    Examination     GENERAL:  Alert, playing on his phone; no distress; wants a phone     HEAD:  Normocephalic, without obvious abnormality, atraumatic   EYES:  PERRL, conjunctiva/corneas clear, no scleral icterus, EOM's intact   NOSE: Nares normal, septum midline, mucosa normal, no drainage   THROAT: Lips, mucosa, and tongue normal; teeth and gums normal, mouth moist   NECK: Supple, symmetrical, trachea midline   BACK:   Symmetric, no curvature, ROM normal   LUNGS:   Clear to auscultation bilaterally, no rales, rhonchi, or wheezing, symmetric chest rise on inhalation, respirations unlabored   CHEST WALL:  No tenderness or deformity   HEART:  Regular rate and rhythm, S1 and S2 normal, no murmur, rub, or gallop    ABDOMEN:   Soft, non-tender, bowel sounds active all four quadrants, no masses, no organomegaly, no rebound or guarding   EXTREMITIES: Extremities normal, atraumatic, no cyanosis or edema    SKIN: Dry to touch, no exanthems in the visualized areas   NEURO: Alert, oriented x 4, moves all four extremities freely, non-focal   PSYCH: Cooperative, behavior is appropriate      Pertinent Lab          Pertinent Radiology     Radiology Results:   Recent Results (from the past 24 hours)   CT Abdomen Pelvis w Contrast    Narrative    EXAM: CT ABDOMEN PELVIS W CONTRAST  LOCATION: [facility]  DATE: 12/5/2024    INDICATION: Right flank pain, eval for kidney stone, pyelonephritis, abscess, gallbladder pathology, appendicitis  COMPARISON: 9/27/2024  TECHNIQUE: CT scan of the abdomen and pelvis was performed following injection of IV contrast. Multiplanar reformats were obtained. Dose reduction techniques were used.  CONTRAST: Isovue 370 100mL    FINDINGS:   LOWER CHEST: Normal.    HEPATOBILIARY: Hepatic steatosis.    PANCREAS: Normal.    SPLEEN: Normal.    ADRENAL GLANDS: Normal.    KIDNEYS/BLADDER: Stable small cyst at the upper pole of the left kidney requiring no specific follow-up. No calculi, hydronephrosis or perinephric stranding bilaterally..    BOWEL: No small bowel or colonic obstruction or inflammatory changes. Normal appendix. Large amount of formed stool in the colon.    LYMPH NODES: No lymphadenopathy.    VASCULATURE: Unremarkable.    PELVIC ORGANS: No pelvic masses. No free fluid or fluid collections. No free air.    MUSCULOSKELETAL: Unremarkable.      Impression    IMPRESSION:   1.  No acute findings in the abdomen and pelvis.         Advance Care Planning        Lexi Hendrickson MD  [facility]  [facility]   Phone: #[phone]

## 2024-12-06 NOTE — PROGRESS NOTES
[facility] MEDICINE PROGRESS NOTE      Identification/Summary: Sunil Rondon is a 39 year old male with a past medical history of recurrent UTI for unclear reasons, morbid obesity, borderline personality disorder, mood issues with depression, developmental delay, mental retardation, GERD, metabolic syndrome, mild intermittent asthma, schizoaffective, self-injurious behavior, and BETHEL who was admitted on 12/5/2024 for right sided flank pain.    ED work up showed normal creatinine and electrolytes. White cell count was 12 and hemoglobin was 12. MCV was 73. UA positive for leukocyte esterase, no white cells. Lactic acid 2.4---1.5. CT abdomen pelvis with contrast showed no acute findings. CRP 13.10 on 12/5.  Likely discharge tomorrow with ongoing urinary symptoms.    Assessment and Plan:  Partially treated UTI  Was recently diagnosed with UTI with Klebsiella on urine culture on 11/26/2024, according to father he was taking Bactrim for 2 days.  -Continue ceftriaxone urine culture pending  Afebrile, leukocytosis improved from 12-9.9  CRP at 13  Lactic acidosis resolved with fluids  We will discharge him on cefdinir     Diabetes mellitus type 2 non-insulin-dependent  -Continue metformin, fingersticks  Recent A1c 6.3.    Hypertension, essential  - 12/6: Hold amlodipine, losartan, metoprolol as blood pressure is on the lower side 116/73    Mood issues  -Continue Zoloft, trazodone, clozapine, Tori appraising, BuSpar;     Morbid obesity with BETHEL  mild intermittant asthma  -nighttime pulse ox     Pharmacy consult  -Patient is at risk for anticholinergic side effects with fairly extensive medication list    Metabolic syndrome with hepatic steatosis      Clinically Significant Risk Factors Present on Admission                   # Hypertension: Noted on problem list      # Severe Obesity: Estimated body mass index is 55.63 kg/m  as calculated from the following:    Height as of this encounter: 1.93 m  "(6' 4\").    Weight as of this encounter: 207.3 kg (457 lb).       # Financial/Environmental Concerns:           Diet: Moderate Consistent Carb (60 g CHO per Meal) Diet  Jaime Catheter: Not present  Lines: None       DVT Prophylaxis:  Ambulate   Code Status: Prior    Medically Ready for Discharge: Anticipated Tomorrow     Obdulio CASTILLO)  Hospitalist PA Student  [facility]  Phone: #[phone]  Securely message with the Vocera Web Console (learn more here)  Text page via [employer] Paging/Directory     Interval History/Subjective:  Patient complains of right flank pain, does not feel like current pain regimen has been helping. Has endorsed some dysuria.  Denies fever, chills, diarrhea, shortness of breath, and chest pain.     Physical Exam/Objective:  Temp:  [97.7  F (36.5  C)-98.1  F (36.7  C)] 97.7  F (36.5  C)  Pulse:  [] 81  Resp:  [16-20] 18  BP: (114-134)/(58-88) 127/88  SpO2:  [94 %-99 %] 99 %  Body mass index is 55.63 kg/m .      GENERAL:  Alert, patient lying in bed, no distress   HEAD:  Normocephalic, without obvious abnormality, atraumatic   EYES:  PERRL   NECK: Supple, symmetrical, trachea midline   BACK:   Symmetric, no curvature, ROM normal   LUNGS:   Clear to auscultation bilaterally, respirations unlabored   CHEST WALL:  No tenderness or deformity   HEART:  Regular rate and rhythm, S1 and S2 normal, no murmur, rub, or gallop    ABDOMEN:   Soft, obese abdomen, non-tender, bowel sounds active all four quadrants, no masses, no organomegaly, no rebound or guarding   EXTREMITIES: Extremities normal, atraumatic, no cyanosis or edema    NEURO: Alert, oriented x 4   PSYCH: Cooperative, behavior is appropriate          Data reviewed today: I personally reviewed all new medications, labs, imaging/diagnostics reports over the past 24 hours. Pertinent findings include:    Imaging:   Recent Results (from the past 24 hours)   CT Abdomen Pelvis w Contrast    " Narrative    EXAM: CT ABDOMEN PELVIS W CONTRAST  LOCATION: [facility]  DATE: 12/5/2024    INDICATION: Right flank pain, eval for kidney stone, pyelonephritis, abscess, gallbladder pathology, appendicitis  COMPARISON: 9/27/2024  TECHNIQUE: CT scan of the abdomen and pelvis was performed following injection of IV contrast. Multiplanar reformats were obtained. Dose reduction techniques were used.  CONTRAST: Isovue 370 100mL    FINDINGS:   LOWER CHEST: Normal.    HEPATOBILIARY: Hepatic steatosis.    PANCREAS: Normal.    SPLEEN: Normal.    ADRENAL GLANDS: Normal.    KIDNEYS/BLADDER: Stable small cyst at the upper pole of the left kidney requiring no specific follow-up. No calculi, hydronephrosis or perinephric stranding bilaterally..    BOWEL: No small bowel or colonic obstruction or inflammatory changes. Normal appendix. Large amount of formed stool in the colon.    LYMPH NODES: No lymphadenopathy.    VASCULATURE: Unremarkable.    PELVIC ORGANS: No pelvic masses. No free fluid or fluid collections. No free air.    MUSCULOSKELETAL: Unremarkable.      Impression    IMPRESSION:   1.  No acute findings in the abdomen and pelvis.       Labs:  Most Recent 3 CBC's:  Recent Labs   Lab Test 12/05/24  1601 09/30/24  0140 09/29/24  0404 09/28/24  0554   WBC 12.2*  --  9.7 15.1*   HGB 12.5*  --  11.1* 10.4*   MCV 73*  --  74* 74*    264 218 192     Most Recent 3 BMP's:  Recent Labs   Lab Test 12/06/24  0738 12/05/24  2139 12/05/24 2005 12/05/24 2004 12/05/24  1601 09/30/24  0916 09/30/24  0231 09/30/24  0140 09/28/24  0628 09/28/24  0554   NA  --   --   --  139 141  --   --   --   --  141   POTASSIUM  --   --   --  4.1 4.4 3.9  --   --    < > 3.7   CHLORIDE  --   --   --  105 104  --   --   --   --  106   CO2  --   --   --  23 22  --   --   --   --  25   BUN  --   --   --  17.0 16.7  --   --   --   --  12.5   CR  --   --   --  0.88 0.99  --   --  0.67  --  0.67   ANIONGAP  --   --   --  11 15  --    --   --   --  10   SANAZ  --   --   --  9.3 9.5  --   --   --   --  8.9   GLC 99 166* 97 98 116*  --    < >  --    < > 98    < > = values in this interval not displayed.     Most Recent 2 LFT's:  Recent Labs   Lab Test 12/05/24 1601 09/28/24  0554   AST 37 20   ALT 37 26   ALKPHOS 108 89   BILITOTAL 0.2 0.4     Most Recent 3 Creatinines:  Recent Labs   Lab Test 12/05/24 2004 12/05/24 1601 09/30/24  0140   CR 0.88 0.99 0.67     Most Recent 6 Bacteria Isolates From Any Culture (See EPIC Reports for Culture Details):No lab results found.  Most Recent 6 glucoses:  Recent Labs   Lab Test 12/06/24  0738 12/05/24  2139 12/05/24 2005 12/05/24 2004 12/05/24  1601 09/30/24  0630   GLC 99 166* 97 98 116* 131*     Most Recent Urinalysis:  Recent Labs   Lab Test 12/05/24  1547 08/20/23  0103 03/24/18  1525   COLOR Yellow   < > Straw   APPEARANCE Clear   < > Clear   URINEGLC 30*   < > Negative   URINEBILI Negative   < > Negative   URINEKETONE Negative   < > Negative   SG 1.030   < > 1.010   UBLD Negative   < > Negative   URINEPH 5.5   < > 7.0   PROTEIN 50*   < > Negative   UROBILINOGEN  --   --  <2.0 E.U./dL   NITRITE Negative   < > Negative   LEUKEST 75 Sonny/uL*   < > Negative   RBCU 1   < >  --    WBCU 5   < >  --     < > = values in this interval not displayed.       Medications:   Personally Reviewed.  Medications   Current Facility-Administered Medications   Medication Dose Route Frequency Provider Last Rate Last Admin    sodium chloride 0.9 % infusion   Intravenous Continuous Lexi Hendrickson MD 75 mL/hr at 12/06/24 0934 New Bag at 12/06/24 0934     Current Facility-Administered Medications   Medication Dose Route Frequency Provider Last Rate Last Admin    [Held by provider] amLODIPine (NORVASC) tablet 5 mg  5 mg Oral Daily Lexi Hendrickson MD        atorvastatin (LIPITOR) tablet 20 mg  20 mg Oral At Bedtime Lexi Hendrickson MD   20 mg at 12/05/24 2146    busPIRone (BUSPAR) tablet 15 mg  15 mg Oral BID Emeka  Lexi GROSSMAN MD   15 mg at 12/06/24 0823    busPIRone (BUSPAR) tablet 30 mg  30 mg Oral At Bedtime Lexi Hendrickson MD   30 mg at 12/05/24 2146    cariprazine (VRAYLAR) capsule 6 mg  6 mg Oral QAM Lexi Hendrickson MD   6 mg at 12/06/24 0857    cefTRIAXone (ROCEPHIN) 2 g vial to attach to  ml bag for ADULTS or NS 50 ml bag for PEDS  2 g Intravenous Q24H Lexi Hendrickson MD        cloZAPine (CLOZARIL) tablet 325 mg  325 mg Oral At Bedtime Lexi Hendrickson MD   325 mg at 12/05/24 2146    famotidine (PEPCID) tablet 20 mg  20 mg Oral BID Lexi Hendrickson MD   20 mg at 12/06/24 0823    insulin aspart (NovoLOG) injection (RAPID ACTING)  1-7 Units Subcutaneous TID AC Lexi Hendrickson MD        insulin aspart (NovoLOG) injection (RAPID ACTING)  1-5 Units Subcutaneous At Bedtime Lexi Hendrickson MD        lamoTRIgine (LaMICtal) tablet 200 mg  200 mg Oral At Bedtime Lexi Hendrickson MD   200 mg at 12/05/24 2146    [Held by provider] losartan (COZAAR) tablet 100 mg  100 mg Oral Daily Lexi Hendrickson MD        [START ON 12/7/2024] metFORMIN (GLUCOPHAGE) tablet 1,000 mg  1,000 mg Oral BID w/meals Lexi Hendrickson MD        metoprolol succinate ER (TOPROL XL) 24 hr tablet 100 mg  100 mg Oral Daily Kacey Morris MD        pantoprazole (PROTONIX) EC tablet 40 mg  40 mg Oral BID Lexi Hendrickson MD   40 mg at 12/06/24 0823    sertraline (ZOLOFT) tablet 300 mg  300 mg Oral Daily Lexi Hendrickson MD   300 mg at 12/06/24 0857    tiZANidine (ZANAFLEX) tablet 4 mg  4 mg Oral At Bedtime Lexi Hendrickson MD   4 mg at 12/05/24 2146    traZODone (DESYREL) tablet 100 mg  100 mg Oral At Bedtime Lexi Hendrickson MD   100 mg at 12/05/24 2146    trihexyphenidyl (ARTANE) tablet 4 mg  4 mg Oral At Bedtime Trent Buchanan MD   4 mg at 12/05/24 2157    And    trihexyphenidyl (ARTANE) half-tab 1 mg  1 mg Oral At Bedtime Trent Buchanan MD   1 mg at 12/05/24 2157     45 spent in evaluating this patient including  performing history physical, formulating care plan, discussing with the staff.    I agree with the documentation and findings as written by ADRIEL Noble and our discussed and formulated assessment and plan. I was present with ADRIEL Noble who participated in the service and documentation of the note. I have also personally verified the history and personally performed the physical exam and medical decision-making. I agree with the assessment and plan of care as documented in the note.    Kacey Morris MD  Internal Medicine  Hospitalist  [facility]  Phone: #[phone]

## 2024-12-06 NOTE — PLAN OF CARE
Goal Outcome Evaluation:    Note from [id number]. VSS on 2L O2 via oxymask. Denies shortness of breath. A&Ox4. Pt rated pain 6/10 in back right side, declined tylenol. Baseline numbness & tingling in all extremities. SBA for transferring. L PIV running NS at 75mL/hr. Voiding adequately. Education of medication administration and use of call-light to reduce risk for falls and injury. No further issues noted.     Problem: Adult Inpatient Plan of Care  Goal: Optimal Comfort and Wellbeing  Outcome: Progressing     Problem: Adult Inpatient Plan of Care  Goal: Readiness for Transition of Care  Outcome: Progressing

## 2024-12-06 NOTE — PHARMACY-CONSULT NOTE
Pharmacy Clozapine Continuation Note    Patient's Name: Sunil Rondon  Patient's : 1985    Current cloZAPine regimen: 325mg po q HS  Has there been a known interruption in therapy for greater than/equal to 48 hours which would warrant retitration No    Recent ANC Value(s) for last 30 days:  2024: Absolute Neutrophils 8.8 10e3/uL (H; Ref range: 1.6 - 8.3 10e3/uL)      A REMS Dispense Authorization was obtained from the clozapine REMS prgram? Yes  [id number]    If no, why was the REMS Dispense Authorization not successful: n/a  A Dispense Rationale was needed to obtain the REMS Dispense Authorization? No   Is the ANC (if available) within recommended limits? Yes  Does the patient have any signs or symptoms of infection, including fever? Yes  r/o urosepsis    Plan:  Continue clozapine therapy at 350 mg PO hs.  A WBC with differential will be ordered at least weekly, NEXT DUE 2024   ANC values will be entered into the REMS program.    Angelo James RPH

## 2024-12-06 NOTE — PROGRESS NOTES
"[facility] MEDICINE PROGRESS NOTE      Identification/Summary: Sunil Rondon is a 39 year old male with a past medical history of recurrent UTI for unclear reasons, morbid obesity, borderline personality disorder, mood issues with depression, developmental delay, mental retardation, GERD, metabolic syndrome, mild intermittent asthma, schizoaffective, self-injurious behavior, and BETHEL who was admitted on 12/5/2024 for right sided flank pain.    ED work up showed normal creatinine and electrolytes. White cell count was 12 and hemoglobin was 12. MCV was 73. UA positive for leukocyte esterase, no white cells. Lactic acid 2.4---1.5. CT abdomen pelvis with contrast showed no acute findings. CRP 13.10 on 12/5.    Assessment and Plan:    UTI  -Rocephin 2 g IV, urine culture, maintenance IV fluid  -With dysuria, obtain UA 12/6    Diabetes mellitus type 2 non-insulin-dependent  -Continue metformin, fingersticks,    Hypertension, essential  - 12/6: Hold amlodipine, losartan d/t /73    Mood issues  -Continue Zoloft, trazodone, clozapine, Tori appraising, BuSpar;     Morbid obesity with BETHEL  mild intermittant asthma  -nighttime pulse ox     Pharmacy consult  -Patient is at risk for anticholinergic side effects with fairly extensive medication list    Metabolic syndrome with hepatic steatosis      Clinically Significant Risk Factors Present on Admission   { TIP  This section helps capture the illness of the patient on admission.     - Review diagnoses highlighted in blue; right click, edit & delete if not appropriate   - If blank, no additional diagnoses identified   :[id number]}                # Hypertension: Noted on problem list           # Severe Obesity: Estimated body mass index is 55.63 kg/m  as calculated from the following:    Height as of this encounter: 1.93 m (6' 4\").    Weight as of this encounter: 207.3 kg (457 lb).       # Financial/Environmental Concerns:           Diet: Moderate " Consistent Carb (60 g CHO per Meal) Diet  Jaime Catheter: Not present  Lines: None       DVT Prophylaxis:  Ambulate   Code Status: Prior    Anticipated possible discharge in 1 days to 2 once pain management and infection control milestones are met.  Disposition Plan      Expected Discharge Date: 12/07/2024                The patient's care was discussed with the Patient.    Obdulio CASTILLO)  Hospitalist [facility]  Phone: #[phone]  Securely message with the Vocera Web Console (learn more here)  Text page via [employer] Paging/Directory     Interval History/Subjective:  Patient complains of right flank pain, does not feel like current pain regimen has been helping. Has endorsed some dysuria.  Denies fever, chills, diarrhea, shortness of breath, and chest pain.     Physical Exam/Objective:  Temp:  [97.7  F (36.5  C)-98.1  F (36.7  C)] 97.7  F (36.5  C)  Pulse:  [] 81  Resp:  [16-20] 18  BP: (114-134)/(58-88) 127/88  SpO2:  [94 %-99 %] 99 %  Body mass index is 55.63 kg/m .  ***    GENERAL:  Alert, patient lying in bed, no distress   HEAD:  Normocephalic, without obvious abnormality, atraumatic   EYES:  PERRL   NECK: Supple, symmetrical, trachea midline   BACK:   Symmetric, no curvature, ROM normal   LUNGS:   Clear to auscultation bilaterally, respirations unlabored   CHEST WALL:  No tenderness or deformity   HEART:  Regular rate and rhythm, S1 and S2 normal, no murmur, rub, or gallop    ABDOMEN:   Soft, obese abdomen, non-tender, bowel sounds active all four quadrants, no masses, no organomegaly, no rebound or guarding   EXTREMITIES: Extremities normal, atraumatic, no cyanosis or edema    NEURO: Alert, oriented x 4   PSYCH: Cooperative, behavior is appropriate          Data reviewed today: I personally reviewed all new medications, labs, imaging/diagnostics reports over the past 24 hours. Pertinent findings include:    Imaging:   Recent Results (from  the past 24 hours)   CT Abdomen Pelvis w Contrast    Narrative    EXAM: CT ABDOMEN PELVIS W CONTRAST  LOCATION: [facility]  DATE: 12/5/2024    INDICATION: Right flank pain, eval for kidney stone, pyelonephritis, abscess, gallbladder pathology, appendicitis  COMPARISON: 9/27/2024  TECHNIQUE: CT scan of the abdomen and pelvis was performed following injection of IV contrast. Multiplanar reformats were obtained. Dose reduction techniques were used.  CONTRAST: Isovue 370 100mL    FINDINGS:   LOWER CHEST: Normal.    HEPATOBILIARY: Hepatic steatosis.    PANCREAS: Normal.    SPLEEN: Normal.    ADRENAL GLANDS: Normal.    KIDNEYS/BLADDER: Stable small cyst at the upper pole of the left kidney requiring no specific follow-up. No calculi, hydronephrosis or perinephric stranding bilaterally..    BOWEL: No small bowel or colonic obstruction or inflammatory changes. Normal appendix. Large amount of formed stool in the colon.    LYMPH NODES: No lymphadenopathy.    VASCULATURE: Unremarkable.    PELVIC ORGANS: No pelvic masses. No free fluid or fluid collections. No free air.    MUSCULOSKELETAL: Unremarkable.      Impression    IMPRESSION:   1.  No acute findings in the abdomen and pelvis.       Labs:  Most Recent 3 CBC's:  Recent Labs   Lab Test 12/05/24  1601 09/30/24  0140 09/29/24  0404 09/28/24  0554   WBC 12.2*  --  9.7 15.1*   HGB 12.5*  --  11.1* 10.4*   MCV 73*  --  74* 74*    264 218 192     Most Recent 3 BMP's:  Recent Labs   Lab Test 12/06/24  0738 12/05/24  2139 12/05/24 2005 12/05/24 2004 12/05/24  1601 09/30/24  0916 09/30/24  0231 09/30/24  0140 09/28/24  0628 09/28/24  0554   NA  --   --   --  139 141  --   --   --   --  141   POTASSIUM  --   --   --  4.1 4.4 3.9  --   --    < > 3.7   CHLORIDE  --   --   --  105 104  --   --   --   --  106   CO2  --   --   --  23 22  --   --   --   --  25   BUN  --   --   --  17.0 16.7  --   --   --   --  12.5   CR  --   --   --  0.88 0.99  --   --   0.67  --  0.67   ANIONGAP  --   --   --  11 15  --   --   --   --  10   SANAZ  --   --   --  9.3 9.5  --   --   --   --  8.9   GLC 99 166* 97 98 116*  --    < >  --    < > 98    < > = values in this interval not displayed.     Most Recent 2 LFT's:  Recent Labs   Lab Test 12/05/24 1601 09/28/24  0554   AST 37 20   ALT 37 26   ALKPHOS 108 89   BILITOTAL 0.2 0.4     Most Recent 3 Creatinines:  Recent Labs   Lab Test 12/05/24 2004 12/05/24 1601 09/30/24  0140   CR 0.88 0.99 0.67     Most Recent 6 Bacteria Isolates From Any Culture (See EPIC Reports for Culture Details):No lab results found.  Most Recent 6 glucoses:  Recent Labs   Lab Test 12/06/24  0738 12/05/24  2139 12/05/24 2005 12/05/24 2004 12/05/24  1601 09/30/24  0630   GLC 99 166* 97 98 116* 131*     Most Recent Urinalysis:  Recent Labs   Lab Test 12/05/24  1547 08/20/23  0103 03/24/18  1525   COLOR Yellow   < > Straw   APPEARANCE Clear   < > Clear   URINEGLC 30*   < > Negative   URINEBILI Negative   < > Negative   URINEKETONE Negative   < > Negative   SG 1.030   < > 1.010   UBLD Negative   < > Negative   URINEPH 5.5   < > 7.0   PROTEIN 50*   < > Negative   UROBILINOGEN  --   --  <2.0 E.U./dL   NITRITE Negative   < > Negative   LEUKEST 75 Sonny/uL*   < > Negative   RBCU 1   < >  --    WBCU 5   < >  --     < > = values in this interval not displayed.       Medications:   Personally Reviewed.  Medications   Current Facility-Administered Medications   Medication Dose Route Frequency Provider Last Rate Last Admin    sodium chloride 0.9 % infusion   Intravenous Continuous Lexi Hendrickson MD 75 mL/hr at 12/06/24 0934 New Bag at 12/06/24 0934     Current Facility-Administered Medications   Medication Dose Route Frequency Provider Last Rate Last Admin    [Held by provider] amLODIPine (NORVASC) tablet 5 mg  5 mg Oral Daily Lexi Hendrickson MD        atorvastatin (LIPITOR) tablet 20 mg  20 mg Oral At Bedtime Lexi Hendrickson MD   20 mg at 12/05/24 9146     busPIRone (BUSPAR) tablet 15 mg  15 mg Oral BID Lexi Hendrickson MD   15 mg at 12/06/24 0823    busPIRone (BUSPAR) tablet 30 mg  30 mg Oral At Bedtime Lexi Hendrickson MD   30 mg at 12/05/24 2146    cariprazine (VRAYLAR) capsule 6 mg  6 mg Oral QAM Lexi Hendrickson MD   6 mg at 12/06/24 0857    cefTRIAXone (ROCEPHIN) 2 g vial to attach to  ml bag for ADULTS or NS 50 ml bag for PEDS  2 g Intravenous Q24H Lexi Hendrickson MD        cloZAPine (CLOZARIL) tablet 325 mg  325 mg Oral At Bedtime Lexi Hendrickson MD   325 mg at 12/05/24 2146    famotidine (PEPCID) tablet 20 mg  20 mg Oral BID Lexi Hendrickson MD   20 mg at 12/06/24 0823    insulin aspart (NovoLOG) injection (RAPID ACTING)  1-7 Units Subcutaneous TID AC Lexi Hendrickson MD        insulin aspart (NovoLOG) injection (RAPID ACTING)  1-5 Units Subcutaneous At Bedtime Lexi Hendrickson MD        lamoTRIgine (LaMICtal) tablet 200 mg  200 mg Oral At Bedtime Lexi Hendrickson MD   200 mg at 12/05/24 2146    [Held by provider] losartan (COZAAR) tablet 100 mg  100 mg Oral Daily Lexi Hendrickson MD        [START ON 12/7/2024] metFORMIN (GLUCOPHAGE) tablet 1,000 mg  1,000 mg Oral BID w/meals Lexi Hendricskon MD        metoprolol succinate ER (TOPROL XL) 24 hr tablet 100 mg  100 mg Oral Daily Kacey Morris MD        pantoprazole (PROTONIX) EC tablet 40 mg  40 mg Oral BID Lexi Hendrickson MD   40 mg at 12/06/24 0823    sertraline (ZOLOFT) tablet 300 mg  300 mg Oral Daily Lexi Hendrickson MD   300 mg at 12/06/24 0857    tiZANidine (ZANAFLEX) tablet 4 mg  4 mg Oral At Bedtime Lexi Hendrickson MD   4 mg at 12/05/24 2146    traZODone (DESYREL) tablet 100 mg  100 mg Oral At Bedtime Lexi Hendrickson MD   100 mg at 12/05/24 2146    trihexyphenidyl (ARTANE) tablet 4 mg  4 mg Oral At Bedtime Trent Buchanan MD   4 mg at 12/05/24 2157    And    trihexyphenidyl (ARTANE) half-tab 1 mg  1 mg Oral At Bedtime Trent Buchanan MD   1 mg at 12/05/24 2157

## 2024-12-06 NOTE — ED NOTES
Bed: [place] [address]  Expected date:   Expected time:   Means of arrival:   Comments:  Room 23 will transfer to 18

## 2024-12-06 NOTE — ED NOTES
Bed: [facility]06  Expected date: 12/5/24  Expected time:   Means of arrival:   Comments:  Hold for 18 or 20

## 2024-12-06 NOTE — ED NOTES
This writer updated provider that /88 dropping to 116/73. New orders obtained in regards to blood pressure medications.

## 2024-12-06 NOTE — PHARMACY-CONSULT NOTE
Pharmacy consulted on which medications of the patients may cause anticholinergic effects. After reviewing patients PTA Med List, three medications were identified.   Clozapine  High relative anticholinergic potency   Increased risk of effects when given concomitantly with other anticholinergic medications  Trihexyphenidyl  Anticholinergic   Tizanidine   Has anticholinergic effects    PTA Med List   Medication Sig Note Last Dose/Taking    acetaminophen (TYLENOL) 500 MG tablet Take 2 tablets (1,000 mg) by mouth every 6 hours as needed for pain or fever.  Past Week    albuterol (PROAIR HFA/PROVENTIL HFA/VENTOLIN HFA) 108 (90 Base) MCG/ACT inhaler Inhale 2 puffs into the lungs every 6 hours as needed   Past Month    amLODIPine (NORVASC) 5 MG tablet [AMLODIPINE (NORVASC) 5 MG TABLET] Take 1 tablet (5 mg total) by mouth daily.  12/5/2024 Morning    atorvastatin (LIPITOR) 20 MG tablet Take 20 mg by mouth At Bedtime  12/4/2024 Bedtime    busPIRone (BUSPAR) 15 MG tablet Take 30 mg by mouth at bedtime. (15 mg QAM/midday)  12/4/2024 Bedtime    busPIRone (BUSPAR) 15 MG tablet Take 15 mg by mouth 2 times daily. AM and midday/afternoon (30 mg at bedtime)  12/5/2024 Noon    cariprazine (VRAYLAR) 1.5 MG capsule Take 6 mg by mouth every morning.  12/5/2024 Morning    cloZAPine (CLOZARIL) 50 MG tablet Take 325 mg by mouth at bedtime. 12/5/2024: (3 x 100 mg tabs + 25 mg tab) 12/4/2024 Bedtime    famotidine (PEPCID) 20 MG tablet [FAMOTIDINE (PEPCID) 20 MG TABLET] Take 20 mg by mouth 2 (two) times a day.  12/5/2024 Morning    lamoTRIgine (LAMICTAL) 200 MG tablet Take 200 mg by mouth at bedtime.  12/4/2024 Bedtime    liraglutide (VICTOZA) 18 MG/3ML solution Inject 1.8 mg Subcutaneous daily  12/5/2024 Morning    losartan (COZAAR) 100 MG tablet [LOSARTAN (COZAAR) 100 MG TABLET] Take 100 mg by mouth daily.  12/5/2024 Morning    metFORMIN (GLUCOPHAGE) 1000 MG tablet Take 1,000 mg by mouth 2 times daily (with meals)   12/5/2024 Morning     metoprolol succinate ER (TOPROL-XL) 200 MG 24 hr tablet Take 200 mg by mouth daily  12/5/2024 Morning    omeprazole (PRILOSEC) 40 MG capsule [OMEPRAZOLE (PRILOSEC) 40 MG CAPSULE] Take 40 mg by mouth 2 (two) times a day.  12/5/2024 Morning    sertraline (ZOLOFT) 100 MG tablet [SERTRALINE (ZOLOFT) 100 MG TABLET] Take 300 mg by mouth daily.  12/5/2024 Morning    sulfamethoxazole-trimethoprim (BACTRIM DS) 800-160 MG tablet Take 1 tablet by mouth 2 times daily. For 10 days. Started 12/05/24 AM  Taking    tiZANidine (ZANAFLEX) 4 MG tablet Take 4 mg by mouth at bedtime.  12/4/2024 Bedtime    traZODone (DESYREL) 100 MG tablet Take 100 mg by mouth at bedtime.  12/4/2024 Bedtime    trihexyphenidyl (ARTANE) 5 MG tablet Take 5 mg by mouth at bedtime.  12/4/2024 Bedtime

## 2024-12-07 ENCOUNTER — APPOINTMENT (OUTPATIENT)
Dept: ULTRASOUND IMAGING | Facility: CLINIC | Age: 39
End: 2024-12-07
Attending: INTERNAL MEDICINE
Payer: MEDICAID

## 2024-12-07 ENCOUNTER — APPOINTMENT (OUTPATIENT)
Dept: RADIOLOGY | Facility: CLINIC | Age: 39
End: 2024-12-07
Attending: INTERNAL MEDICINE
Payer: MEDICAID

## 2024-12-07 ENCOUNTER — APPOINTMENT (OUTPATIENT)
Dept: CT IMAGING | Facility: CLINIC | Age: 39
End: 2024-12-07
Attending: INTERNAL MEDICINE
Payer: MEDICAID

## 2024-12-07 VITALS
HEIGHT: 76 IN | WEIGHT: 315 LBS | BODY MASS INDEX: 38.36 KG/M2 | OXYGEN SATURATION: 97 % | DIASTOLIC BLOOD PRESSURE: 67 MMHG | TEMPERATURE: 98.9 F | SYSTOLIC BLOOD PRESSURE: 126 MMHG | HEART RATE: 79 BPM | RESPIRATION RATE: 18 BRPM

## 2024-12-07 LAB
GLUCOSE BLDC GLUCOMTR-MCNC: 112 MG/DL (ref 70–99)
GLUCOSE BLDC GLUCOMTR-MCNC: 122 MG/DL (ref 70–99)
GLUCOSE BLDC GLUCOMTR-MCNC: 178 MG/DL (ref 70–99)
GLUCOSE BLDC GLUCOMTR-MCNC: 94 MG/DL (ref 70–99)

## 2024-12-07 PROCEDURE — 999N000104 CT LUMBAR SPINE RECONSTRUCTED

## 2024-12-07 PROCEDURE — 73502 X-RAY EXAM HIP UNI 2-3 VIEWS: CPT

## 2024-12-07 PROCEDURE — 76775 US EXAM ABDO BACK WALL LIM: CPT

## 2024-12-07 PROCEDURE — 250N000013 HC RX MED GY IP 250 OP 250 PS 637: Performed by: STUDENT IN AN ORGANIZED HEALTH CARE EDUCATION/TRAINING PROGRAM

## 2024-12-07 PROCEDURE — 250N000013 HC RX MED GY IP 250 OP 250 PS 637: Performed by: INTERNAL MEDICINE

## 2024-12-07 PROCEDURE — 258N000003 HC RX IP 258 OP 636: Performed by: EMERGENCY MEDICINE

## 2024-12-07 PROCEDURE — 250N000013 HC RX MED GY IP 250 OP 250 PS 637: Performed by: EMERGENCY MEDICINE

## 2024-12-07 PROCEDURE — 99239 HOSP IP/OBS DSCHRG MGMT >30: CPT | Performed by: INTERNAL MEDICINE

## 2024-12-07 RX ORDER — IBUPROFEN 400 MG/1
400 TABLET, FILM COATED ORAL EVERY 6 HOURS PRN
Qty: 15 TABLET | Refills: 0 | Status: SHIPPED | OUTPATIENT
Start: 2024-12-07 | End: 2024-12-07

## 2024-12-07 RX ORDER — CEFDINIR 300 MG/1
300 CAPSULE ORAL 2 TIMES DAILY
Qty: 16 CAPSULE | Refills: 0 | Status: SHIPPED | OUTPATIENT
Start: 2024-12-07

## 2024-12-07 RX ORDER — POLYETHYLENE GLYCOL 3350 17 G/17G
17 POWDER, FOR SOLUTION ORAL DAILY
Qty: 510 G | Refills: 0 | Status: SHIPPED | OUTPATIENT
Start: 2024-12-07

## 2024-12-07 RX ORDER — IBUPROFEN 400 MG/1
400 TABLET, FILM COATED ORAL EVERY 6 HOURS PRN
Qty: 15 TABLET | Refills: 0 | Status: SHIPPED | OUTPATIENT
Start: 2024-12-07

## 2024-12-07 RX ORDER — METHOCARBAMOL 500 MG/1
500 TABLET, FILM COATED ORAL 4 TIMES DAILY
Qty: 28 TABLET | Refills: 0 | Status: SHIPPED | OUTPATIENT
Start: 2024-12-07 | End: 2024-12-07

## 2024-12-07 RX ORDER — METOPROLOL SUCCINATE 50 MG/1
50 TABLET, EXTENDED RELEASE ORAL DAILY
Qty: 30 TABLET | Refills: 0 | Status: SHIPPED | OUTPATIENT
Start: 2024-12-07

## 2024-12-07 RX ORDER — METHOCARBAMOL 500 MG/1
500 TABLET, FILM COATED ORAL 4 TIMES DAILY
Qty: 28 TABLET | Refills: 0 | Status: SHIPPED | OUTPATIENT
Start: 2024-12-07

## 2024-12-07 RX ORDER — METOPROLOL SUCCINATE 50 MG/1
50 TABLET, EXTENDED RELEASE ORAL DAILY
Qty: 30 TABLET | Refills: 0 | Status: SHIPPED | OUTPATIENT
Start: 2024-12-07 | End: 2024-12-07

## 2024-12-07 RX ORDER — CEFDINIR 300 MG/1
300 CAPSULE ORAL 2 TIMES DAILY
Qty: 16 CAPSULE | Refills: 0 | Status: SHIPPED | OUTPATIENT
Start: 2024-12-07 | End: 2024-12-07

## 2024-12-07 RX ORDER — IBUPROFEN 400 MG/1
400 TABLET, FILM COATED ORAL EVERY 6 HOURS PRN
Status: DISCONTINUED | OUTPATIENT
Start: 2024-12-07 | End: 2024-12-07 | Stop reason: HOSPADM

## 2024-12-07 RX ORDER — METHOCARBAMOL 500 MG/1
500 TABLET, FILM COATED ORAL 4 TIMES DAILY
Status: DISCONTINUED | OUTPATIENT
Start: 2024-12-07 | End: 2024-12-07 | Stop reason: HOSPADM

## 2024-12-07 RX ORDER — CEFDINIR 300 MG/1
300 CAPSULE ORAL 2 TIMES DAILY
Qty: 12 CAPSULE | Refills: 0 | Status: CANCELLED | OUTPATIENT
Start: 2024-12-07 | End: 2024-12-13

## 2024-12-07 RX ORDER — POLYETHYLENE GLYCOL 3350 17 G/17G
17 POWDER, FOR SOLUTION ORAL DAILY
Qty: 510 G | Refills: 0 | Status: SHIPPED | OUTPATIENT
Start: 2024-12-07 | End: 2024-12-07

## 2024-12-07 RX ORDER — IBUPROFEN 600 MG/1
600 TABLET, FILM COATED ORAL ONCE
Status: COMPLETED | OUTPATIENT
Start: 2024-12-07 | End: 2024-12-07

## 2024-12-07 RX ADMIN — METFORMIN HYDROCHLORIDE 1000 MG: 500 TABLET, FILM COATED ORAL at 08:58

## 2024-12-07 RX ADMIN — PANTOPRAZOLE SODIUM 40 MG: 40 TABLET, DELAYED RELEASE ORAL at 08:58

## 2024-12-07 RX ADMIN — SENNOSIDES AND DOCUSATE SODIUM 2 TABLET: 50; 8.6 TABLET ORAL at 08:57

## 2024-12-07 RX ADMIN — POLYETHYLENE GLYCOL 3350 17 G: 17 POWDER, FOR SOLUTION ORAL at 08:59

## 2024-12-07 RX ADMIN — DICLOFENAC SODIUM 2 G: 10 GEL TOPICAL at 16:06

## 2024-12-07 RX ADMIN — SODIUM CHLORIDE: 9 INJECTION, SOLUTION INTRAVENOUS at 00:48

## 2024-12-07 RX ADMIN — HYDROXYZINE HYDROCHLORIDE 25 MG: 25 TABLET ORAL at 12:37

## 2024-12-07 RX ADMIN — METHOCARBAMOL 500 MG: 500 TABLET ORAL at 12:02

## 2024-12-07 RX ADMIN — ACETAMINOPHEN 650 MG: 325 TABLET ORAL at 00:18

## 2024-12-07 RX ADMIN — IBUPROFEN 600 MG: 600 TABLET ORAL at 00:49

## 2024-12-07 RX ADMIN — SERTRALINE 300 MG: 100 TABLET, FILM COATED ORAL at 09:11

## 2024-12-07 RX ADMIN — METHOCARBAMOL 500 MG: 500 TABLET ORAL at 16:06

## 2024-12-07 RX ADMIN — IBUPROFEN 400 MG: 400 TABLET, FILM COATED ORAL at 09:43

## 2024-12-07 RX ADMIN — DICLOFENAC SODIUM 2 G: 10 GEL TOPICAL at 11:28

## 2024-12-07 RX ADMIN — METOPROLOL SUCCINATE 100 MG: 100 TABLET, EXTENDED RELEASE ORAL at 08:57

## 2024-12-07 RX ADMIN — BUSPIRONE HYDROCHLORIDE 15 MG: 10 TABLET ORAL at 11:28

## 2024-12-07 RX ADMIN — FAMOTIDINE 20 MG: 20 TABLET, FILM COATED ORAL at 08:58

## 2024-12-07 RX ADMIN — BUSPIRONE HYDROCHLORIDE 15 MG: 10 TABLET ORAL at 08:57

## 2024-12-07 RX ADMIN — DICLOFENAC SODIUM 2 G: 10 GEL TOPICAL at 09:07

## 2024-12-07 RX ADMIN — CARIPRAZINE 6 MG: 1.5 CAPSULE, GELATIN COATED ORAL at 09:07

## 2024-12-07 ASSESSMENT — ACTIVITIES OF DAILY LIVING (ADL)
ADLS_ACUITY_SCORE: 58
ADLS_ACUITY_SCORE: 57
ADLS_ACUITY_SCORE: 58
ADLS_ACUITY_SCORE: 59
ADLS_ACUITY_SCORE: 64
ADLS_ACUITY_SCORE: 57
ADLS_ACUITY_SCORE: 57
ADLS_ACUITY_SCORE: 59
ADLS_ACUITY_SCORE: 60
ADLS_ACUITY_SCORE: 64
ADLS_ACUITY_SCORE: 57
ADLS_ACUITY_SCORE: 60
ADLS_ACUITY_SCORE: 64
ADLS_ACUITY_SCORE: 58
ADLS_ACUITY_SCORE: 57
ADLS_ACUITY_SCORE: 57
ADLS_ACUITY_SCORE: 60

## 2024-12-07 NOTE — PLAN OF CARE
Problem: Pain Acute  Goal: Optimal Pain Control and Function  Outcome: Progressing  Intervention: Develop Pain Management Plan  Recent Flowsheet Documentation  Taken 12/7/2024 0855 by Yusra Clements RN  Pain Management Interventions: medication (see MAR)  Intervention: Prevent or Manage Pain  Recent Flowsheet Documentation  Taken 12/7/2024 0855 by Yusra Clements RN  Medication Review/Management: medications reviewed     Problem: Adult Inpatient Plan of Care  Goal: Optimal Comfort and Wellbeing  Outcome: Progressing  Intervention: Monitor Pain and Promote Comfort  Recent Flowsheet Documentation  Taken 12/7/2024 0855 by Yusra Clements RN  Pain Management Interventions: medication (see MAR)     Problem: UTI (Urinary Tract Infection)  Goal: Improved Infection Symptoms  Outcome: Progressing   Goal Outcome Evaluation:    Patient is alert and oriented, vitally stable, afrebrile. Denies fever or chills. Endorses flank pain, managed by PRN Ibuprofen, topical Voltaren gel. Has urinary frequency, bladder scanned once this and had a PVR of 36 ml, ambulated in the hallway. Ultra sound of kidney done, pending, Xray of right hip done, negative for fracture, discharge pending.

## 2024-12-07 NOTE — PLAN OF CARE
Problem: Adult Inpatient Plan of Care  Goal: Optimal Comfort and Wellbeing  Outcome: Progressing  Intervention: Monitor Pain and Promote Comfort  Recent Flowsheet Documentation  Taken 12/6/2024 2004 by Mary Coyne RN  Pain Management Interventions: medication (see MAR)   Goal Outcome Evaluation:       Pt is alert and oriented x4. C/o right flank pain radiating to lower abdomen. Had Tylenol but with no effect per pt. Diclofenac gel administered and given PRN Atarax for anxiety and pain. VS and blood sugar stable. No insulin coverage given. IVF NS @ 75ml/hr infusing. Voiding good amount.     HLM Admission: 5- Standing (1 or more minutes)  HLM Daily6- Walk 10 steps or more

## 2024-12-07 NOTE — DISCHARGE SUMMARY
[facility] MEDICINE  DISCHARGE SUMMARY     Primary Care Physician: Bertrand Valle  Admission Date: 12/5/2024   Discharge Provider: Kacey Morris MD Discharge Date: 12/7/2024   Diet: Orders Placed This Encounter      Moderate Consistent Carb (60 g CHO per Meal) Diet      Diet      Code Status: Full Code   Activity: activity as tolerated   [facility]      Condition at Discharge: Stable      REASON FOR PRESENTATION(See Admission Note for Details)   Right-sided flank pain    PRINCIPAL & ACTIVE DISCHARGE DIAGNOSES     Active Problems:  Recently diagnosed partially treated pyelonephritis due to Klebsiella   Right lumbar back pain likely musculoskeletal etiology  Diabetes mellitus type 2 controlled  Hypertension  Morbid obesity  BETHEL  Asthma  Mood disorder  Metabolic syndrome with hepatic steatosis      SIGNIFICANT FINDINGS (Imaging, labs):     Most Recent 3 CBC's:  Recent Labs   Lab Test 12/06/24  1033 12/05/24  1601 09/30/24  0140 09/29/24  0404   WBC 9.9 12.2*  --  9.7   HGB 11.7* 12.5*  --  11.1*   MCV 72* 73*  --  74*    304 264 218      Most Recent 3 BMP's:  Recent Labs   Lab Test 12/07/24  1719 12/07/24  1453 12/07/24  0619 12/05/24  2005 12/05/24  2004 12/05/24  1601 09/30/24  0916 09/30/24  0231 09/30/24  0140 09/28/24  0628 09/28/24  0554   NA  --   --   --   --  139 141  --   --   --   --  141   POTASSIUM  --   --   --   --  4.1 4.4 3.9  --   --    < > 3.7   CHLORIDE  --   --   --   --  105 104  --   --   --   --  106   CO2  --   --   --   --  23 22  --   --   --   --  25   BUN  --   --   --   --  17.0 16.7  --   --   --   --  12.5   CR  --   --   --   --  0.88 0.99  --   --  0.67  --  0.67   ANIONGAP  --   --   --   --  11 15  --   --   --   --  10   SANAZ  --   --   --   --  9.3 9.5  --   --   --   --  8.9   * 122* 112*   < > 98 116*  --    < >  --    < > 98    < > = values in this interval not displayed.     Urine culture at outside clinic from 11/26  Klebsiella pneumonia pansensitive, sensitive to ceftriaxone    Results for orders placed or performed during the hospital encounter of 12/05/24   CT Abdomen Pelvis w Contrast    Narrative    EXAM: CT ABDOMEN PELVIS W CONTRAST  LOCATION: [facility]  DATE: 12/5/2024    INDICATION: Right flank pain, eval for kidney stone, pyelonephritis, abscess, gallbladder pathology, appendicitis  COMPARISON: 9/27/2024  TECHNIQUE: CT scan of the abdomen and pelvis was performed following injection of IV contrast. Multiplanar reformats were obtained. Dose reduction techniques were used.  CONTRAST: Isovue 370 100mL    FINDINGS:   LOWER CHEST: Normal.    HEPATOBILIARY: Hepatic steatosis.    PANCREAS: Normal.    SPLEEN: Normal.    ADRENAL GLANDS: Normal.    KIDNEYS/BLADDER: Stable small cyst at the upper pole of the left kidney requiring no specific follow-up. No calculi, hydronephrosis or perinephric stranding bilaterally..    BOWEL: No small bowel or colonic obstruction or inflammatory changes. Normal appendix. Large amount of formed stool in the colon.    LYMPH NODES: No lymphadenopathy.    VASCULATURE: Unremarkable.    PELVIC ORGANS: No pelvic masses. No free fluid or fluid collections. No free air.    MUSCULOSKELETAL: Unremarkable.      Impression    IMPRESSION:   1.  No acute findings in the abdomen and pelvis.   US Kidney Right    Narrative    EXAM: US KIDNEY RIGHT  LOCATION: [facility]  DATE: 12/7/2024    INDICATION: persisitent pain, recent UTI  COMPARISON: CT 12/5/2024  TECHNIQUE: Routine Right Renal and Bladder Ultrasound.    FINDINGS:    RIGHT KIDNEY: 17.3 cm. Normal without hydronephrosis or masses.     BLADDER: Normal.      Impression    IMPRESSION:  Normal right kidney ultrasound.     XR Pelvis w Hip Right 1 View    Narrative    EXAM: XR PELVIS AND HIP RIGHT 1 VIEW  LOCATION: [facility]  DATE: 12/7/2024    INDICATION: groin pain  COMPARISON:  None.      Impression    IMPRESSION: Both hips are negative for fracture or dislocation. The pelvis is negative for fracture.   CT Lumbar Spine Reconstructed    Narrative    EXAM: CT LUMBAR SPINE RECONSTRUCTED  LOCATION: [facility]  DATE: 12/7/2024    INDICATION: Right lumbar pain  COMPARISON: None.  TECHNIQUE: Routine CT Lumbar Spine without IV contrast. Multiplanar reformats. Dose reduction techniques were used.     FINDINGS:  VERTEBRA: Grade 1 anterolisthesis L3 on L4 measuring 3 mm. No fracture or posttraumatic subluxation.     CANAL/FORAMINA: No canal or neural foraminal stenosis.    PARASPINAL: No extraspinal abnormality.      Impression    IMPRESSION:  1.  No fracture or posttraumatic subluxation.  2.  No high-grade spinal canal or neural foraminal stenosis.        PENDING LABS         PROCEDURES ( this hospitalization only)          RECOMMENDATION FOR F/U VISIT       DISPOSITION     Home    SUMMARY OF HOSPITAL COURSE:    39 year old male with a past medical history of recurrent UTI for unclear reasons, morbid obesity, borderline personality disorder, mood issues with depression, developmental delay, mental retardation, GERD, metabolic syndrome, mild intermittent asthma, schizoaffective, self-injurious behavior, and BETHEL who was admitted on 12/5/2024 for right sided flank pain, lumbar back pain.  He was recently diagnosed with UTI and was on Bactrim for couple days.  Urinalysis was consistent with UTI and admission, urine culture returned negative likely secondary to partial treatment.  Cultures from outside clinic reviewed which grew Klebsiella  that was pansensitive.  He was treated with ceftriaxone, discharging on cefdinir.  Continued to have right-sided lower lumbar area pain with tenderness, suspicious for musculoskeletal etiology.  CT of the lumbar spine no significant spinal stenosis noted.  Was discharged on ibuprofen Robaxin and diclofenac gel.  His blood pressure was on the  lower side 1 10-1 20 systolic without blood pressure medications, antihypertensives amlodipine, losartan discontinued, metoprolol dose was reduced.  Monitor blood pressure as outpatient and adjust medications as needed.    Discharge Medications with Med changes:        Review of your medicines        START taking        Dose / Directions   cefdinir 300 MG capsule  Commonly known as: OMNICEF  Used for: Pyelonephritis      Dose: 300 mg  Take 1 capsule (300 mg) by mouth 2 times daily for 8 days.  Quantity: 16 capsule  Refills: 0     diclofenac 1 % topical gel  Commonly known as: VOLTAREN  Used for: Muscle pain      Dose: 2 g  Apply 2 g topically 4 times daily.  Quantity: 100 g  Refills: 0     ibuprofen 400 MG tablet  Commonly known as: ADVIL/MOTRIN  Used for: Muscle pain      Dose: 400 mg  Take 1 tablet (400 mg) by mouth every 6 hours as needed for inflammatory pain.  Quantity: 15 tablet  Refills: 0     methocarbamol 500 MG tablet  Commonly known as: ROBAXIN  Used for: Muscle pain      Dose: 500 mg  Take 1 tablet (500 mg) by mouth 4 times daily for 7 days.  Quantity: 28 tablet  Refills: 0     polyethylene glycol 17 GM/Dose powder  Commonly known as: MIRALAX  Used for: Muscle pain      Dose: 17 g  Take 17 g by mouth daily.  Quantity: 510 g  Refills: 0            CONTINUE these medicines which may have CHANGED, or have new prescriptions. If we are uncertain of the size of tablets/capsules you have at home, strength may be listed as something that might have changed.        Dose / Directions   metoprolol succinate ER 50 MG 24 hr tablet  Commonly known as: TOPROL XL  This may have changed:   medication strength  how much to take  Used for: Primary hypertension      Dose: 50 mg  Take 1 tablet (50 mg) by mouth daily.  Quantity: 30 tablet  Refills: 0            CONTINUE these medicines which have NOT CHANGED        Dose / Directions   acetaminophen 500 MG tablet  Commonly known as: TYLENOL      Dose: 1,000 mg  Take 2 tablets  (1,000 mg) by mouth every 6 hours as needed for pain or fever.  Quantity: 50 tablet  Refills: 0     albuterol 108 (90 Base) MCG/ACT inhaler  Commonly known as: PROAIR HFA/PROVENTIL HFA/VENTOLIN HFA      Dose: 2 puff  Inhale 2 puffs into the lungs every 6 hours as needed  Refills: 0     atorvastatin 20 MG tablet  Commonly known as: LIPITOR      Dose: 20 mg  Take 20 mg by mouth At Bedtime  Refills: 0     * busPIRone 15 MG tablet  Commonly known as: BUSPAR      Dose: 15 mg  Take 15 mg by mouth 2 times daily. AM and midday/afternoon (30 mg at bedtime)  Refills: 0     * busPIRone 15 MG tablet  Commonly known as: BUSPAR      Dose: 30 mg  Take 30 mg by mouth at bedtime. (15 mg QAM/midday)  Refills: 0     cariprazine 1.5 MG capsule  Commonly known as: VRAYLAR      Dose: 6 mg  Take 6 mg by mouth every morning.  Refills: 0     cloZAPine 50 MG tablet  Commonly known as: CLOZARIL      Dose: 325 mg  Take 325 mg by mouth at bedtime.  Refills: 0     famotidine 20 MG tablet  Commonly known as: PEPCID      Dose: 20 mg  [FAMOTIDINE (PEPCID) 20 MG TABLET] Take 20 mg by mouth 2 (two) times a day.  Refills: 0     lamoTRIgine 200 MG tablet  Commonly known as: LaMICtal      Dose: 200 mg  Take 200 mg by mouth at bedtime.  Refills: 0     liraglutide 18 MG/3ML solution  Commonly known as: VICTOZA      Dose: 1.8 mg  Inject 1.8 mg Subcutaneous daily  Refills: 0     metFORMIN 1000 MG tablet  Commonly known as: GLUCOPHAGE      Dose: 1,000 mg  Take 1,000 mg by mouth 2 times daily (with meals)  Refills: 0     omeprazole 40 MG DR capsule  Commonly known as: PriLOSEC      Dose: 40 mg  [OMEPRAZOLE (PRILOSEC) 40 MG CAPSULE] Take 40 mg by mouth 2 (two) times a day.  Refills: 0     sertraline 100 MG tablet  Commonly known as: ZOLOFT      Dose: 300 mg  [SERTRALINE (ZOLOFT) 100 MG TABLET] Take 300 mg by mouth daily.  Refills: 0     tiZANidine 4 MG tablet  Commonly known as: ZANAFLEX      Dose: 4 mg  Take 4 mg by mouth at bedtime.  Refills: 0     traZODone  100 MG tablet  Commonly known as: DESYREL      Dose: 100 mg  Take 100 mg by mouth at bedtime.  Refills: 0     trihexyphenidyl 5 MG tablet  Commonly known as: ARTANE      Dose: 5 mg  Take 5 mg by mouth at bedtime.  Refills: 0           * This list has 2 medication(s) that are the same as other medications prescribed for you. Read the directions carefully, and ask your doctor or other care provider to review them with you.                STOP taking      amLODIPine 5 MG tablet  Commonly known as: NORVASC        losartan 100 MG tablet  Commonly known as: COZAAR        sulfamethoxazole-trimethoprim 800-160 MG tablet  Commonly known as: BACTRIM DS                  Where to get your medicines        These medications were sent to [facility] [address] [facility] [address] [address]  [address] [facility] [address]      Phone: [phone]   cefdinir 300 MG capsule  diclofenac 1 % topical gel  ibuprofen 400 MG tablet  methocarbamol 500 MG tablet  metoprolol succinate ER 50 MG 24 hr tablet  polyethylene glycol 17 GM/Dose powder              Rationale for medication changes:      Cefdinir for pyelonephritis  Diclofenac ibuprofen Robaxin for right-sided lumbar musculoskeletal pain        Consults         Immunizations given this encounter     [unfilled]      Discharge Procedure Orders   Reason for your hospital stay   Order Comments: Bladder infection, musculoskeletal pain     Activity   Order Comments: Your activity upon discharge: activity as tolerated     Order Specific Question Answer Comments   Is discharge order? Yes      Discharge Instructions   Order Comments: Monitor BP at home. Keep a log and follow up     Follow-up and recommended labs and tests    Order Comments: Follow up with primary care provider, Bertrand Valle, within 7 days  Follow up with urology in 2-3 weeks  for recurrent bladder infections     Diet   Order Comments: Follow this diet upon discharge: Current  "Diet:Orders Placed This Encounter      Moderate Consistent Carb (60 g CHO per Meal) Diet     Order Specific Question Answer Comments   Is discharge order? Yes      Examination     Vital Signs in last 24 hours:   Vital signs:  Temp: 98.9  F (37.2  C) Temp src: Oral BP: 126/67 Pulse: 79   Resp: 18 SpO2: 97 % O2 Device: None (Room air) Oxygen Delivery: 2 LPM Height: 193 cm (6' 4\") Weight: (!) 207.3 kg (457 lb 0.2 oz)  Estimated body mass index is 55.63 kg/m  as calculated from the following:    Height as of this encounter: 1.93 m (6' 4\").    Weight as of this encounter: 207.3 kg (457 lb 0.2 oz).       Pertinent positives on exam:  Heart S1-S2 heard regular rate and rhythm  Lungs: Clear to auscultation bilaterally  Abdomen: Soft nontender nondistended obese bowel sounds present no guarding or rigidity  Extremities: No edema    Please see EMR for more detailed significant labs, imaging, consultant notes etc.  Total time spent on discharge: > 35 minutes    Kacey Morris MD   [facility] Hospitalist Service: Ph:[phone]    CC:Bertrand Valle      "

## 2024-12-07 NOTE — PROGRESS NOTES
Patient reports continued flank pain, 7/10, not relieved with tylenol.. CT report reviewed. One time dose of Ibuprofen ordered.

## 2024-12-07 NOTE — PLAN OF CARE
"Pt admitted for UTI with sepsis; A&O x4; VSS on Room Air; C/o right flank pain that radiates to the front of his abdomen; PT states tylenol doesn't help, consulted DR. Harvey for a different pain medication, ordered 1x ibuprofen; Urine culture came back negative for bacteria; Pt is independent in room, knows when to call, and call light within reach.    Chichi Garcia RN on 12/7/2024     HLM Admission: 5- Standing (1 or more minutes)  HLM Daily6- Walk 10 steps or more        Problem: Adult Inpatient Plan of Care  Goal: Plan of Care Review  Description: The Plan of Care Review/Shift note should be completed every shift.  The Outcome Evaluation is a brief statement about your assessment that the patient is improving, declining, or no change.  This information will be displayed automatically on your shift  note.  Outcome: Progressing  Goal: Patient-Specific Goal (Individualized)  Description: You can add care plan individualizations to a care plan. Examples of Individualization might be:  \"Parent requests to be called daily at 9am for status\", \"I have a hard time hearing out of my right ear\", or \"Do not touch me to wake me up as it startles  me\".  Outcome: Progressing  Goal: Absence of Hospital-Acquired Illness or Injury  Outcome: Progressing  Intervention: Identify and Manage Fall Risk  Recent Flowsheet Documentation  Taken 12/7/2024 0011 by Chichi Garcia RN  Safety Promotion/Fall Prevention: safety round/check completed  Intervention: Prevent Skin Injury  Recent Flowsheet Documentation  Taken 12/7/2024 0011 by Chichi Garcia, RN  Skin Protection: adhesive use limited  Intervention: Prevent Infection  Recent Flowsheet Documentation  Taken 12/7/2024 0011 by Chichi Garcia, RN  Infection Prevention:   hand hygiene promoted   rest/sleep promoted   single patient room provided  Goal: Optimal Comfort and Wellbeing  Outcome: Progressing  Intervention: Monitor Pain and Promote Comfort  Recent Flowsheet " Documentation  Taken 12/7/2024 0011 by Chichi Garcia, RN  Pain Management Interventions: medication (see MAR)  Goal: Readiness for Transition of Care  Outcome: Progressing     Problem: Pain Acute  Goal: Optimal Pain Control and Function  Outcome: Progressing  Intervention: Develop Pain Management Plan  Recent Flowsheet Documentation  Taken 12/7/2024 0011 by Chichi Garcia, RN  Pain Management Interventions: medication (see MAR)  Intervention: Prevent or Manage Pain  Recent Flowsheet Documentation  Taken 12/7/2024 0011 by Chichi Garcia, RN  Sensory Stimulation Regulation:   care clustered   lighting decreased   quiet environment promoted   music on  Medication Review/Management: medications reviewed

## 2024-12-07 NOTE — PLAN OF CARE
Problem: Adult Inpatient Plan of Care  Goal: Plan of Care Review  Description: The Plan of Care Review/Shift note should be completed every shift.  The Outcome Evaluation is a brief statement about your assessment that the patient is improving, declining, or no change.  This information will be displayed automatically on your shift  note.  Outcome: Met     Problem: Adult Inpatient Plan of Care  Goal: Optimal Comfort and Wellbeing  Outcome: Met     Problem: Pain Acute  Goal: Optimal Pain Control and Function  Outcome: Met     Problem: UTI (Urinary Tract Infection)  Goal: Improved Infection Symptoms  Outcome: Met    Discharge instructions and printed instructions given to patient. Patient's father updated over the phone and medication instructions given as patient has developmental delay.

## 2024-12-10 ENCOUNTER — PATIENT OUTREACH (OUTPATIENT)
Dept: CARE COORDINATION | Facility: CLINIC | Age: 39
End: 2024-12-10
Payer: MEDICAID

## 2024-12-10 LAB — BACTERIA BLD CULT: NO GROWTH

## 2024-12-10 NOTE — PROGRESS NOTES
[facility] Contact  [facility]/Voicemail     Clinical Data: Post-Discharge Outreach     Outreach attempted x 2.  Left message on patient's voicemail, providing [facility]'s central phone number of [phone] ([phone]) for questions/concerns and/or to schedule an appt with an [facility] provider, if they do not have a PCP.      Plan:  [facility] will do no further outreaches at this time.         LATISHA Nobles  [phone]  [facility]

## 2025-02-22 ENCOUNTER — HOSPITAL ENCOUNTER (EMERGENCY)
Facility: CLINIC | Age: 40
Discharge: HOME | End: 2025-02-22
Attending: EMERGENCY MEDICINE | Admitting: EMERGENCY MEDICINE
Payer: MEDICAID

## 2025-02-22 ENCOUNTER — APPOINTMENT (OUTPATIENT)
Dept: RADIOLOGY | Facility: CLINIC | Age: 40
End: 2025-02-22
Payer: MEDICAID

## 2025-02-22 VITALS
TEMPERATURE: 97.4 F | RESPIRATION RATE: 22 BRPM | BODY MASS INDEX: 38.36 KG/M2 | SYSTOLIC BLOOD PRESSURE: 125 MMHG | DIASTOLIC BLOOD PRESSURE: 89 MMHG | HEART RATE: 92 BPM | WEIGHT: 315 LBS | HEIGHT: 76 IN | OXYGEN SATURATION: 93 %

## 2025-02-22 DIAGNOSIS — J06.9 VIRAL UPPER RESPIRATORY INFECTION: ICD-10-CM

## 2025-02-22 DIAGNOSIS — R45.851 SUICIDAL THOUGHTS: ICD-10-CM

## 2025-02-22 LAB
ANION GAP SERPL CALCULATED.3IONS-SCNC: 9 MMOL/L (ref 7–15)
ATRIAL RATE - MUSE: 98 BPM
BASOPHILS # BLD AUTO: 0 10E3/UL (ref 0–0.2)
BASOPHILS NFR BLD AUTO: 0 %
BUN SERPL-MCNC: 15 MG/DL (ref 6–20)
CALCIUM SERPL-MCNC: 9 MG/DL (ref 8.8–10.4)
CHLORIDE SERPL-SCNC: 106 MMOL/L (ref 98–107)
CREAT SERPL-MCNC: 0.77 MG/DL (ref 0.67–1.17)
D DIMER PPP FEU-MCNC: <0.27 UG/ML FEU (ref 0–0.5)
DIASTOLIC BLOOD PRESSURE - MUSE: NORMAL MMHG
EGFRCR SERPLBLD CKD-EPI 2021: >90 ML/MIN/1.73M2
EOSINOPHIL # BLD AUTO: 0.1 10E3/UL (ref 0–0.7)
EOSINOPHIL NFR BLD AUTO: 1 %
ERYTHROCYTE [DISTWIDTH] IN BLOOD BY AUTOMATED COUNT: 20.9 % (ref 10–15)
FLUAV RNA SPEC QL NAA+PROBE: NEGATIVE
FLUBV RNA RESP QL NAA+PROBE: NEGATIVE
GLUCOSE BLDC GLUCOMTR-MCNC: 102 MG/DL (ref 70–99)
GLUCOSE SERPL-MCNC: 128 MG/DL (ref 70–99)
HCO3 SERPL-SCNC: 26 MMOL/L (ref 22–29)
HCT VFR BLD AUTO: 37.6 % (ref 40–53)
HGB BLD-MCNC: 11.5 G/DL (ref 13.3–17.7)
IMM GRANULOCYTES # BLD: 0.1 10E3/UL
IMM GRANULOCYTES NFR BLD: 1 %
INTERPRETATION ECG - MUSE: NORMAL
LYMPHOCYTES # BLD AUTO: 1.2 10E3/UL (ref 0.8–5.3)
LYMPHOCYTES NFR BLD AUTO: 11 %
MCH RBC QN AUTO: 22.7 PG (ref 26.5–33)
MCHC RBC AUTO-ENTMCNC: 30.6 G/DL (ref 31.5–36.5)
MCV RBC AUTO: 74 FL (ref 78–100)
MONOCYTES # BLD AUTO: 0.7 10E3/UL (ref 0–1.3)
MONOCYTES NFR BLD AUTO: 6 %
NEUTROPHILS # BLD AUTO: 9 10E3/UL (ref 1.6–8.3)
NEUTROPHILS NFR BLD AUTO: 81 %
NRBC # BLD AUTO: 0 10E3/UL
NRBC BLD AUTO-RTO: 0 /100
NT-PROBNP SERPL-MCNC: <36 PG/ML (ref 0–450)
P AXIS - MUSE: 59 DEGREES
PLATELET # BLD AUTO: 210 10E3/UL (ref 150–450)
POTASSIUM SERPL-SCNC: 4.4 MMOL/L (ref 3.4–5.3)
PR INTERVAL - MUSE: 192 MS
QRS DURATION - MUSE: 106 MS
QT - MUSE: 354 MS
QTC - MUSE: 451 MS
R AXIS - MUSE: 51 DEGREES
RBC # BLD AUTO: 5.07 10E6/UL (ref 4.4–5.9)
RSV RNA SPEC NAA+PROBE: NEGATIVE
S PYO DNA THROAT QL NAA+PROBE: NOT DETECTED
SARS-COV-2 RNA RESP QL NAA+PROBE: NEGATIVE
SODIUM SERPL-SCNC: 141 MMOL/L (ref 135–145)
SYSTOLIC BLOOD PRESSURE - MUSE: NORMAL MMHG
T AXIS - MUSE: 37 DEGREES
TROPONIN T SERPL HS-MCNC: 8 NG/L
TROPONIN T SERPL HS-MCNC: <6 NG/L
VENTRICULAR RATE- MUSE: 98 BPM
WBC # BLD AUTO: 11.2 10E3/UL (ref 4–11)

## 2025-02-22 PROCEDURE — 36415 COLL VENOUS BLD VENIPUNCTURE: CPT | Performed by: EMERGENCY MEDICINE

## 2025-02-22 PROCEDURE — 85025 COMPLETE CBC W/AUTO DIFF WBC: CPT

## 2025-02-22 PROCEDURE — 71046 X-RAY EXAM CHEST 2 VIEWS: CPT

## 2025-02-22 PROCEDURE — 82962 GLUCOSE BLOOD TEST: CPT

## 2025-02-22 PROCEDURE — 250N000012 HC RX MED GY IP 250 OP 636 PS 637

## 2025-02-22 PROCEDURE — 250N000011 HC RX IP 250 OP 636

## 2025-02-22 PROCEDURE — 96374 THER/PROPH/DIAG INJ IV PUSH: CPT

## 2025-02-22 PROCEDURE — 85379 FIBRIN DEGRADATION QUANT: CPT

## 2025-02-22 PROCEDURE — 250N000013 HC RX MED GY IP 250 OP 250 PS 637: Performed by: EMERGENCY MEDICINE

## 2025-02-22 PROCEDURE — 258N000003 HC RX IP 258 OP 636

## 2025-02-22 PROCEDURE — 99285 EMERGENCY DEPT VISIT HI MDM: CPT | Mod: 25

## 2025-02-22 PROCEDURE — 83880 ASSAY OF NATRIURETIC PEPTIDE: CPT

## 2025-02-22 PROCEDURE — 36415 COLL VENOUS BLD VENIPUNCTURE: CPT

## 2025-02-22 PROCEDURE — 82565 ASSAY OF CREATININE: CPT

## 2025-02-22 PROCEDURE — 84484 ASSAY OF TROPONIN QUANT: CPT | Performed by: EMERGENCY MEDICINE

## 2025-02-22 PROCEDURE — 87651 STREP A DNA AMP PROBE: CPT

## 2025-02-22 PROCEDURE — 250N000013 HC RX MED GY IP 250 OP 250 PS 637

## 2025-02-22 PROCEDURE — 87637 SARSCOV2&INF A&B&RSV AMP PRB: CPT

## 2025-02-22 PROCEDURE — 93005 ELECTROCARDIOGRAM TRACING: CPT

## 2025-02-22 PROCEDURE — 96361 HYDRATE IV INFUSION ADD-ON: CPT

## 2025-02-22 PROCEDURE — 82310 ASSAY OF CALCIUM: CPT

## 2025-02-22 RX ORDER — BENZONATATE 100 MG/1
100 CAPSULE ORAL 3 TIMES DAILY PRN
Qty: 9 CAPSULE | Refills: 0 | Status: SHIPPED | OUTPATIENT
Start: 2025-02-22

## 2025-02-22 RX ORDER — GUAIFENESIN/DEXTROMETHORPHAN 100-10MG/5
10 SYRUP ORAL EVERY 4 HOURS PRN
Status: DISCONTINUED | OUTPATIENT
Start: 2025-02-22 | End: 2025-02-22 | Stop reason: HOSPADM

## 2025-02-22 RX ORDER — KETOROLAC TROMETHAMINE 15 MG/ML
10 INJECTION, SOLUTION INTRAMUSCULAR; INTRAVENOUS ONCE
Status: COMPLETED | OUTPATIENT
Start: 2025-02-22 | End: 2025-02-22

## 2025-02-22 RX ORDER — BENZONATATE 100 MG/1
100 CAPSULE ORAL 3 TIMES DAILY PRN
Status: DISCONTINUED | OUTPATIENT
Start: 2025-02-22 | End: 2025-02-22 | Stop reason: HOSPADM

## 2025-02-22 RX ORDER — ONDANSETRON 2 MG/ML
4 INJECTION INTRAMUSCULAR; INTRAVENOUS EVERY 30 MIN PRN
Status: DISCONTINUED | OUTPATIENT
Start: 2025-02-22 | End: 2025-02-22 | Stop reason: HOSPADM

## 2025-02-22 RX ADMIN — DEXAMETHASONE 10 MG: 2 TABLET ORAL at 14:25

## 2025-02-22 RX ADMIN — BENZONATATE 100 MG: 100 CAPSULE ORAL at 20:18

## 2025-02-22 RX ADMIN — KETOROLAC TROMETHAMINE 10 MG: 15 INJECTION, SOLUTION INTRAMUSCULAR; INTRAVENOUS at 14:25

## 2025-02-22 RX ADMIN — GUAIFENESIN AND DEXTROMETHORPHAN 10 ML: 100; 10 SYRUP ORAL at 16:16

## 2025-02-22 RX ADMIN — SODIUM CHLORIDE 1000 ML: 0.9 INJECTION, SOLUTION INTRAVENOUS at 14:24

## 2025-02-22 ASSESSMENT — COLUMBIA-SUICIDE SEVERITY RATING SCALE - C-SSRS
2. HAVE YOU ACTUALLY HAD ANY THOUGHTS OF KILLING YOURSELF IN THE PAST MONTH?: YES
5. HAVE YOU STARTED TO WORK OUT OR WORKED OUT THE DETAILS OF HOW TO KILL YOURSELF? DO YOU INTEND TO CARRY OUT THIS PLAN?: NO
4. HAVE YOU HAD THESE THOUGHTS AND HAD SOME INTENTION OF ACTING ON THEM?: NO
6. HAVE YOU EVER DONE ANYTHING, STARTED TO DO ANYTHING, OR PREPARED TO DO ANYTHING TO END YOUR LIFE?: YES
1. IN THE PAST MONTH, HAVE YOU WISHED YOU WERE DEAD OR WISHED YOU COULD GO TO SLEEP AND NOT WAKE UP?: YES
3. HAVE YOU BEEN THINKING ABOUT HOW YOU MIGHT KILL YOURSELF?: YES

## 2025-02-22 ASSESSMENT — ACTIVITIES OF DAILY LIVING (ADL)
ADLS_ACUITY_SCORE: 58

## 2025-02-22 NOTE — ED NOTES
Blood collected from existing IV.10 ml of blood collected and wasted.  Blood collected for test and sent to the lab.  IV flushed with 10 ml of NS.  Kristel Snow RN 2/22/2025 4:49 PM

## 2025-02-22 NOTE — CONSULTS
2/22/2025  Sunil Rondon 1985     Writer consulted with ED provider, Ashley Pulliam.  It was determined that pt would not benefit from assessment at this time due to MD no longer requesting DEC assessment and pt declining assessment. Pt is not endorsing any mental health concerns but scored as moderate risk on C-SSRS and so consult order was placed. However, pt reported passive SI at baseline, has no plan or intent, has significant community services in place, and confirmed with MD that he will be seeing his individual therapist on Wednesday. Due to this, he asked to not participate in DEC assessment because he is primarily concerned about the medical symptoms that he presented to ED for.       OR DEC order has been closed at this time.    FREEMAN Lewis LICSW on 2/22/2025 at 3:31 PM

## 2025-02-22 NOTE — ED PROVIDER NOTES
Emergency Department Encounter   NAME: Sunil Rondon  AGE: 39 year old male   YOB: 1985 ;   MRN: [id number] ;    ED PROVIDER: Ashley Pulliam PA-C    PCP: Bertrand Valle    Evaluation Date & Time:   2/22/2025  1:16 PM    CHIEF COMPLAINT:  URI, Shortness of Breath, and Pharyngitis      FINAL IMPRESSION:    ICD-10-CM    1. Viral upper respiratory infection  J06.9       2. Suicidal thoughts  R45.851             IMPRESSION AND PLAN   MDM: Sunil Rondon is a 39 year old male with a pertinent history of gender identity disorder, psychosis, mental retardation, schizoaffective disorder, MAYRA, morbid obesity, HTN, GERD who presents to the ED by walk-in for evaluation of intermittent flulike symptoms including cough, congestion, sore throat, headache and fatigue for the last several weeks.  Patient notes that his roommate is currently experiencing similar symptoms.  Additionally, patient does endorse a history of suicidal ideation although he denies current SI, HI or plans to harm himself.  He does actively participate in psychotherapy and takes medications.    Vitals tachycardic at 120 otherwise vitally stable. On exam patient is morbidly obese but nontoxic-appearing.  Pulmonary exam reveals increased effort of breathing but otherwise no evidence of wheezing, rhonchi or stridor.  Certainly not in acute respiratory distress.  Cardiac exam reveals tachycardia but no obvious murmurs.  Remainder of exam as detailed below.  Differential certainly include COVID, influenza, RSV, pneumonia, strep throat, bronchitis, pleural effusion, pneumothorax, CHF exacerbation, asthma exacerbation, PE, pericarditis, myositis.    Patient started on IV fluids, Toradol, Decadron and Robitussin for initial symptom management.  CBC reveals mild leukocytosis at 11.2.  D-dimer WNL, low suspicion for PE.  BNP WNL, certainly no evidence of acute fluid overload as a result of CHF exacerbation.  Troponin 7, delta Trope pending.  ECG  without any acute ischemic changes, no diffuse ST elevation to suggest pericarditis.  Viral swab negative for COVID, influenza, RSV.  Strep test also negative.  CXR without any evidence of focal infiltrates to suggest pneumonia, no pleural effusion or pneumothorax.  Patient's symptoms most consistent with URI, likely viral in etiology.  I did offer dad consults to discuss suicidal ideation however, patient does not feel this is necessary today since he already follows up with therapy and takes medications.  I do feel this is agreeable at this time do not feel that patient is a harm to himself or others.    Patient signed out to Dr. Brewster. Disposition pending delta troponin, likely plan for discharge with symptomatic cares for suspected URI.       Medical Decision Making  Obtained supplemental history:Supplemental history obtained?: Family Member/Significant Other  Reviewed external records: External records reviewed?: No  Care impacted by chronic illness:Documented in Chart  Care significantly affected by social determinants of health:Access to Medical Care  Did you consider but not order tests?: Work up considered but not performed and documented in chart, if applicable  Did you interpret images independently?: Independent interpretation of ECG and images noted in documentation, when applicable.  Consultation discussion with other provider:Did you involve another provider (consultant, MH, pharmacy, etc.)?: No  Patient signed out to Dr. Brewster.    Not Applicable       ED COURSE:  12:46 PM I met and introduced myself to the patient. I gathered initial history and performed my physical exam. We discussed plan for initial workup.   3:34 PM I have staffed the patient with Dr. Brewster, ED MD, who has evaluated the patient and agrees with all aspects of today's care.   5:29 PM Patient signed out to Dr. Brewster.            MEDICATIONS GIVEN IN THE EMERGENCY DEPARTMENT:  Medications   sodium chloride 0.9% BOLUS 1,000  mL (0 mLs Intravenous Stopped 2/22/25 9855)   ketorolac (TORADOL) injection 10 mg (10 mg Intravenous $Given 2/22/25 1423)   dexAMETHasone (DECADRON) tablet 10 mg (10 mg Oral $Given 2/22/25 1426)         NEW PRESCRIPTIONS STARTED AT TODAY'S ED VISIT:  Discharge Medication List as of 2/22/2025  8:12 PM        START taking these medications    Details   benzonatate (TESSALON) 100 MG capsule Take 1 capsule (100 mg) by mouth 3 times daily as needed for cough., Disp-9 capsule, R-0, Local Print               BRIEF HPI   Patient information was obtained from: Patient   Use of Intrepreter: N/A     Sunil Rondon is a 39 year old male who presents with cough, congestion, sore throat, HA, and fatigue for past few days.  However, he states that he has been intermittently ill over the last several weeks or so.  Pt states roommate was sick with something recently.  He denies any known cardiac or pulmonary history, does not use any inhalers at home.    Additionally, on triage screening, patient did endorse suicidal ideation in the last month. He states he has a history of them and has attempted suicide in the past. Today, he has no SI/HI or plan to harm himself or others.      REVIEW OF SYSTEMS:  Pertinent positive and negative symptoms per HPI.       MEDICAL HISTORY     Past Medical History:   Diagnosis Date    Anxiety     Bipolar 1 disorder (H)     Borderline mental retardation     Depression     Diabetes mellitus (H)     Gender identity disorder     GERD (gastroesophageal reflux disease)     Glucose intolerance (impaired glucose tolerance)     Hypertension     Obesity     Panic attacks     Schizoaffective disorder (H)     Sinus tarsi syndrome     Sleep apnea     Suicidal ideation        Past Surgical History:   Procedure Laterality Date    HC KNEE SCOPE,SINGLE MENISECTOMY Left 6/8/2016    Procedure: LEFT KNEE ARTHROSCOPY, PARTIAL MEDIAL MENISCECTOMY, LOOSE BODY REMOVAL, CHONDROPLASTY;  Surgeon: Sky Goodwin MD;  Location:  "[facility] OR;  Service: Orthopedics    OPEN REDUCTION INTERNAL FIXATION ANKLE Left 12/31/2018    Procedure: OPEN REDUCTION INTERNAL FIXATION, FRACTURE, LEFT ANKLE;  Surgeon: Jarad Mosqueda MD;  Location: [facility];  Service: Orthopedics    TENDON REPAIR Left     wrist    UPPER GASTROINTESTINAL ENDOSCOPY         No family history on file.    Social History     Tobacco Use    Smoking status: Never    Smokeless tobacco: Never   Substance Use Topics    Alcohol use: No    Drug use: No         PHYSICAL EXAM     First Vitals:  Patient Vitals for the past 24 hrs:   BP Temp Temp src Pulse Resp SpO2 Height Weight   02/22/25 1821 -- 97.4  F (36.3  C) Oral -- 22 -- -- --   02/22/25 1819 125/89 -- -- 92 -- 93 % -- --   02/22/25 1600 119/65 -- -- 86 -- 95 % -- --   02/22/25 1516 128/66 -- -- 85 20 95 % -- --   02/22/25 1432 125/57 -- -- 93 17 -- -- --   02/22/25 1422 128/56 -- -- 100 14 -- -- --   02/22/25 1326 139/84 -- -- 103 26 94 % -- --   02/22/25 1216 131/90 97.7  F (36.5  C) Oral 120 24 94 % 1.93 m (6' 4\") (!) 214.1 kg (472 lb)       PHYSICAL EXAM:  Physical Exam  Vitals and nursing note reviewed.   Constitutional:       General: He is not in acute distress.     Appearance: Normal appearance. He is obese. He is ill-appearing. He is not toxic-appearing.   HENT:      Head: Normocephalic and atraumatic.      Mouth/Throat:      Mouth: Mucous membranes are moist.      Pharynx: No pharyngeal swelling or oropharyngeal exudate.   Eyes:      General: No scleral icterus.     Conjunctiva/sclera: Conjunctivae normal.   Cardiovascular:      Rate and Rhythm: Regular rhythm. Tachycardia present.      Heart sounds: Normal heart sounds.   Pulmonary:      Effort: No respiratory distress.      Breath sounds: Normal breath sounds. No decreased breath sounds, wheezing or rhonchi.      Comments: Increased effort of breathing.  Chest:      Chest wall: No tenderness.   Musculoskeletal:         General: No deformity.      Cervical " back: Neck supple.   Skin:     General: Skin is warm and dry.   Neurological:      General: No focal deficit present.      Mental Status: He is alert and oriented to person, place, and time.   Psychiatric:         Mood and Affect: Mood normal.          RESULTS     LAB:  All pertinent labs reviewed and interpreted  Labs Ordered and Resulted from Time of ED Arrival to Time of ED Departure   GLUCOSE BY METER - Abnormal       Result Value    GLUCOSE BY METER POCT 102 (*)    BASIC METABOLIC PANEL - Abnormal    Sodium 141      Potassium 4.4      Chloride 106      Carbon Dioxide (CO2) 26      Anion Gap 9      Urea Nitrogen 15.0      Creatinine 0.77      GFR Estimate >90      Calcium 9.0      Glucose 128 (*)    CBC WITH PLATELETS AND DIFFERENTIAL - Abnormal    WBC Count 11.2 (*)     RBC Count 5.07      Hemoglobin 11.5 (*)     Hematocrit 37.6 (*)     MCV 74 (*)     MCH 22.7 (*)     MCHC 30.6 (*)     RDW 20.9 (*)     Platelet Count 210      % Neutrophils 81      % Lymphocytes 11      % Monocytes 6      % Eosinophils 1      % Basophils 0      % Immature Granulocytes 1      NRBCs per 100 WBC 0      Absolute Neutrophils 9.0 (*)     Absolute Lymphocytes 1.2      Absolute Monocytes 0.7      Absolute Eosinophils 0.1      Absolute Basophils 0.0      Absolute Immature Granulocytes 0.1      Absolute NRBCs 0.0     INFLUENZA A/B, RSV AND SARS-COV2 PCR - Normal    Influenza A PCR Negative      Influenza B PCR Negative      RSV PCR Negative      SARS CoV2 PCR Negative     NT PROBNP INPATIENT - Normal    N terminal Pro BNP Inpatient <36     D DIMER QUANTITATIVE - Normal    D-Dimer Quantitative <0.27     TROPONIN T, HIGH SENSITIVITY - Normal    Troponin T, High Sensitivity 8     TROPONIN T, HIGH SENSITIVITY - Normal    Troponin T, High Sensitivity <6     GROUP A STREPTOCOCCUS PCR THROAT SWAB - Normal    Group A strep by PCR Not Detected         RADIOLOGY:  XR Chest 2 Views   Final Result   IMPRESSION: Minimal degenerative change thoracic  spine. Chest otherwise negative. No change from prior.           ECG:  Performed at: 1325 on 2/22/25    Impression: Sinus rhythm, incomplete RBBB    Rate: 98 bpm  Rhythm: Sinus  Axis: Normal  IL Interval: 192 ms  QRS Interval: 106 ms  QTc Interval: 451 ms  ST Changes: None  Comparison: When compared with ECG from 9/20/2024, incomplete right bundle branch block is now present.    EKG results reviewed and interpreted by Dr. Constantino ED MD and Ashley Pulliam PA-C.         Ashley Pulliam PA-C  Emergency Medicine   [facility] EMERGENCY ROOM       Ashley Pulliam PA-C  02/23/25 [id number]

## 2025-02-22 NOTE — ED NOTES
Blood collected from existing IV.10 ml of blood collected and wasted.  Blood collected for test and sent to the lab.  IV flushed with 10 ml of NS.  Kristel Snow RN 2/22/2025 5:37 PM

## 2025-02-22 NOTE — ED PROVIDER NOTES
"Emergency Department Midlevel Supervisory Note     I had a face to face encounter with this patient seen by the Advanced Practice Provider (ADILENE). I personally made/approved the management plan and take responsibility for the patient management. I personally saw patient and performed a substantive portion of the visit including all aspects of the medical decision making.     ED Course:  3:34 PM Ashley Pulliam PA-C staffed patient with me. I agree with their assessment and plan of management, and I will see the patient.  6:37 PM  I met with the patient to introduce myself, gather additional history, perform my initial exam, and discuss the plan. Patient called his dad, and I discussed plan for discharge with both patient and his dad.       EKG #1  Sinus rhythm normal anterior progression normal axis incomplete right bundle branch block    Time:[id number]    Ventricular rate 98 bmp  Axis normal  AK interval 192 ms  QRS duration 106 ms  QT//451 ms    Compared to previous EKG on September 28, 2024 sinus rhythm then.  Right bundle branch block incomplete was not seen before.  Prolonged QTc at 484  Brief HPI:     Sunil Rondon is a 39 year old male who presents for evaluation of URI symptoms of cough, congestion, and shortness of breath, intermittent for the past several weeks. Roommates sick with similar symptoms. He has a home healthcare who manages his medications. Has albuterol but does not use it. Denies history of asthma or COPD. No anticoagulation.    I, Kristin Carrillo, am serving as a scribe to document services personally performed by Daphne Brewster, based on my observations and the provider's statements to me.   I, Daphne Brewster attest that Kristin Carrillo was acting in a scribe capacity, has observed my performance of the services and has documented them in accordance with my direction.    Brief Physical Exam: /89   Pulse 92   Temp 97.4  F (36.3  C) (Oral)   Resp 22   Ht 1.93 m (6' 4\")   Wt (!) 214.1 " kg (472 lb)   SpO2 93%   BMI 57.45 kg/m    Constitutional:  Alert, in no acute distress       MDM:  39-year-old male seen with physician assistant presents here with 2 weeks of cough headache body aches.  Screen positive for suicidal ideation.  He said he had thoughts but no plan has follow-up but not actively suicidal contracts for safety.    Multiple Etiologies to consider symptoms including bacterial viral pulmonary cardiac among others.     Chest x-ray independently interpreted by me reveals no consolidation.    Viral panel negative.  Troponins x 2 negative negative D-dimer.    Will discharge patient with Janee Armas.  I had a chance to speak to his father over the phone.  Patient has follow-up with mental health.  Also encouraged to follow-up primary care doctor.    I do not see any need for antibiotics at this moment 2 weeks of symptoms.    Encourage patient to follow-up and return for worsening symptoms dischargeable at 20 stable condition.    1. Viral upper respiratory infection    2. Suicidal thoughts            Labs and Imaging:  Results for orders placed or performed during the hospital encounter of 02/22/25   XR Chest 2 Views    Impression    IMPRESSION: Minimal degenerative change thoracic spine. Chest otherwise negative. No change from prior.    Influenza A/B, RSV and SARS-CoV2 PCR (COVID-19) Nasopharyngeal    Specimen: Nasopharyngeal; Swab   Result Value Ref Range    Influenza A PCR Negative Negative    Influenza B PCR Negative Negative    RSV PCR Negative Negative    SARS CoV2 PCR Negative Negative   Glucose by meter   Result Value Ref Range    GLUCOSE BY METER POCT 102 (H) 70 - 99 mg/dL   Basic metabolic panel   Result Value Ref Range    Sodium 141 135 - 145 mmol/L    Potassium 4.4 3.4 - 5.3 mmol/L    Chloride 106 98 - 107 mmol/L    Carbon Dioxide (CO2) 26 22 - 29 mmol/L    Anion Gap 9 7 - 15 mmol/L    Urea Nitrogen 15.0 6.0 - 20.0 mg/dL    Creatinine 0.77 0.67 - 1.17 mg/dL    GFR Estimate >90  >60 mL/min/1.73m2    Calcium 9.0 8.8 - 10.4 mg/dL    Glucose 128 (H) 70 - 99 mg/dL   Nt probnp inpatient (BNP)   Result Value Ref Range    N terminal Pro BNP Inpatient <36 0 - 450 pg/mL   CBC with platelets and differential   Result Value Ref Range    WBC Count 11.2 (H) 4.0 - 11.0 10e3/uL    RBC Count 5.07 4.40 - 5.90 10e6/uL    Hemoglobin 11.5 (L) 13.3 - 17.7 g/dL    Hematocrit 37.6 (L) 40.0 - 53.0 %    MCV 74 (L) 78 - 100 fL    MCH 22.7 (L) 26.5 - 33.0 pg    MCHC 30.6 (L) 31.5 - 36.5 g/dL    RDW 20.9 (H) 10.0 - 15.0 %    Platelet Count 210 150 - 450 10e3/uL    % Neutrophils 81 %    % Lymphocytes 11 %    % Monocytes 6 %    % Eosinophils 1 %    % Basophils 0 %    % Immature Granulocytes 1 %    NRBCs per 100 WBC 0 <1 /100    Absolute Neutrophils 9.0 (H) 1.6 - 8.3 10e3/uL    Absolute Lymphocytes 1.2 0.8 - 5.3 10e3/uL    Absolute Monocytes 0.7 0.0 - 1.3 10e3/uL    Absolute Eosinophils 0.1 0.0 - 0.7 10e3/uL    Absolute Basophils 0.0 0.0 - 0.2 10e3/uL    Absolute Immature Granulocytes 0.1 <=0.4 10e3/uL    Absolute NRBCs 0.0 10e3/uL   D dimer quantitative   Result Value Ref Range    D-Dimer Quantitative <0.27 0.00 - 0.50 ug/mL FEU   Result Value Ref Range    Troponin T, High Sensitivity 8 <=22 ng/L   Result Value Ref Range    Troponin T, High Sensitivity <6 <=22 ng/L   ECG 12-LEAD WITH MUSE (LHE)   Result Value Ref Range    Systolic Blood Pressure  mmHg    Diastolic Blood Pressure  mmHg    Ventricular Rate 98 BPM    Atrial Rate 98 BPM    MT Interval 192 ms    QRS Duration 106 ms     ms    QTc 451 ms    P Axis 59 degrees    R AXIS 51 degrees    T Axis 37 degrees    Interpretation ECG       Sinus rhythm  Incomplete right bundle branch block  Borderline ECG  When compared with ECG of 28-Sep-2024 05:29,  Incomplete right bundle branch block is now Present  Confirmed by SEE ED PROVIDER NOTE FOR, ECG INTERPRETATION ([id number]),  CATRACHITO LUNSFORD ([id number]) on 2/22/2025 1:29:26 PM     Group A Streptococcus PCR Throat Swab     Specimen: Throat; Swab   Result Value Ref Range    Group A strep by PCR Not Detected Not Detected       I have reviewed the relevant laboratory studies above.    I independently interpreted the following imaging study(s):   Chest x-ray no consolidation    EKG: I reviewed and independently interpreted the patient's EKG, with comments made as listed below. Please see scanned EKG for full report.   See above    Procedures:  I was present for the key portions of procedures documented in ADILENE/midlevel note, see midlevel note for further details.    Daphne Brewster MD  [facility] EMERGENCY ROOM  [address] [place] [id number] [phone]       Daphne Brewster MD  02/22/25 6424

## 2025-02-23 ENCOUNTER — TELEPHONE (OUTPATIENT)
Dept: BEHAVIORAL HEALTH | Facility: CLINIC | Age: 40
End: 2025-02-23
Payer: MEDICAID

## 2025-02-23 NOTE — ED NOTES
Blood collected from existing IV. 20 ml of blood collected and wasted.  Blood collected for test and sent to the lab.  IV flushed with 10 ml of NS.  Kristel Snow RN 2/22/2025 6:15 PM

## 2025-02-23 NOTE — DISCHARGE INSTRUCTIONS
Read and follow the discharge instructions.    Chest x-ray did not show any pneumonia    Your influenza COVID and RSV were negative.    Blood work that checks your heart is within normal limits.    Continue taking your current medications.    You may take Tessalon Perles as needed for cough.    Stay well-hydrated.    Follow-up with both your primary care doctor and your mental health provided.    Return for worsening symptoms or any other concerns.

## 2025-02-24 NOTE — TELEPHONE ENCOUNTER
This writer was able to speak with pt, who had a question about a medication, writer advised to reach out to his provider or discuss with the pharmacy.

## 2025-05-08 DIAGNOSIS — F20.9 SCHIZOPHRENIA (H): Primary | ICD-10-CM

## 2025-05-08 DIAGNOSIS — Z79.899 ENCOUNTER FOR LONG-TERM (CURRENT) USE OF MEDICATIONS: ICD-10-CM

## 2025-05-29 ENCOUNTER — HOSPITAL ENCOUNTER (EMERGENCY)
Facility: CLINIC | Age: 40
End: 2025-05-29
Payer: MEDICAID

## 2025-05-29 ENCOUNTER — APPOINTMENT (OUTPATIENT)
Dept: CT IMAGING | Facility: CLINIC | Age: 40
End: 2025-05-29
Attending: EMERGENCY MEDICINE
Payer: MEDICAID

## 2025-05-29 ENCOUNTER — HOSPITAL ENCOUNTER (OUTPATIENT)
Facility: CLINIC | Age: 40
Setting detail: OBSERVATION
End: 2025-05-29
Attending: EMERGENCY MEDICINE
Payer: MEDICAID

## 2025-05-29 VITALS
HEART RATE: 97 BPM | DIASTOLIC BLOOD PRESSURE: 64 MMHG | TEMPERATURE: 98 F | HEIGHT: 76 IN | BODY MASS INDEX: 38.36 KG/M2 | WEIGHT: 315 LBS | RESPIRATION RATE: 16 BRPM | OXYGEN SATURATION: 95 % | SYSTOLIC BLOOD PRESSURE: 117 MMHG

## 2025-05-29 DIAGNOSIS — Z79.4 TYPE 2 DIABETES MELLITUS WITH OTHER SPECIFIED COMPLICATION, WITH LONG-TERM CURRENT USE OF INSULIN (H): Primary | ICD-10-CM

## 2025-05-29 DIAGNOSIS — E11.69 TYPE 2 DIABETES MELLITUS WITH OTHER SPECIFIED COMPLICATION, WITH LONG-TERM CURRENT USE OF INSULIN (H): Primary | ICD-10-CM

## 2025-05-29 DIAGNOSIS — R11.2 NAUSEA AND VOMITING, UNSPECIFIED VOMITING TYPE: ICD-10-CM

## 2025-05-29 DIAGNOSIS — E86.0 DEHYDRATION: ICD-10-CM

## 2025-05-29 DIAGNOSIS — K56.7 ILEUS (H): ICD-10-CM

## 2025-05-29 LAB
ANION GAP SERPL CALCULATED.3IONS-SCNC: 13 MMOL/L (ref 7–15)
BASOPHILS # BLD AUTO: 0.1 10E3/UL (ref 0–0.2)
BASOPHILS NFR BLD AUTO: 0 %
BUN SERPL-MCNC: 17.2 MG/DL (ref 6–20)
CALCIUM SERPL-MCNC: 9.2 MG/DL (ref 8.8–10.4)
CHLORIDE SERPL-SCNC: 107 MMOL/L (ref 98–107)
CREAT SERPL-MCNC: 0.63 MG/DL (ref 0.67–1.17)
EGFRCR SERPLBLD CKD-EPI 2021: >90 ML/MIN/1.73M2
EOSINOPHIL # BLD AUTO: 0 10E3/UL (ref 0–0.7)
EOSINOPHIL NFR BLD AUTO: 0 %
ERYTHROCYTE [DISTWIDTH] IN BLOOD BY AUTOMATED COUNT: 19.3 % (ref 10–15)
EST. AVERAGE GLUCOSE BLD GHB EST-MCNC: 157 MG/DL
GLUCOSE BLDC GLUCOMTR-MCNC: 143 MG/DL (ref 70–99)
GLUCOSE SERPL-MCNC: 149 MG/DL (ref 70–99)
HBA1C MFR BLD: 7.1 %
HCO3 SERPL-SCNC: 19 MMOL/L (ref 22–29)
HCT VFR BLD AUTO: 41.4 % (ref 40–53)
HGB BLD-MCNC: 12.3 G/DL (ref 13.3–17.7)
IMM GRANULOCYTES # BLD: 0.2 10E3/UL
IMM GRANULOCYTES NFR BLD: 1 %
LACTATE SERPL-SCNC: 2 MMOL/L (ref 0.7–2)
LACTATE SERPL-SCNC: 2.2 MMOL/L (ref 0.7–2)
LYMPHOCYTES # BLD AUTO: 1.4 10E3/UL (ref 0.8–5.3)
LYMPHOCYTES NFR BLD AUTO: 10 %
MCH RBC QN AUTO: 22.2 PG (ref 26.5–33)
MCHC RBC AUTO-ENTMCNC: 29.7 G/DL (ref 31.5–36.5)
MCV RBC AUTO: 75 FL (ref 78–100)
MONOCYTES # BLD AUTO: 1.1 10E3/UL (ref 0–1.3)
MONOCYTES NFR BLD AUTO: 7 %
NEUTROPHILS # BLD AUTO: 12.4 10E3/UL (ref 1.6–8.3)
NEUTROPHILS NFR BLD AUTO: 82 %
NRBC # BLD AUTO: 0 10E3/UL
NRBC BLD AUTO-RTO: 0 /100
PLATELET # BLD AUTO: 261 10E3/UL (ref 150–450)
POTASSIUM SERPL-SCNC: 4.4 MMOL/L (ref 3.4–5.3)
RBC # BLD AUTO: 5.53 10E6/UL (ref 4.4–5.9)
SODIUM SERPL-SCNC: 139 MMOL/L (ref 135–145)
WBC # BLD AUTO: 15.2 10E3/UL (ref 4–11)

## 2025-05-29 PROCEDURE — 36415 COLL VENOUS BLD VENIPUNCTURE: CPT | Performed by: STUDENT IN AN ORGANIZED HEALTH CARE EDUCATION/TRAINING PROGRAM

## 2025-05-29 PROCEDURE — 250N000013 HC RX MED GY IP 250 OP 250 PS 637: Performed by: STUDENT IN AN ORGANIZED HEALTH CARE EDUCATION/TRAINING PROGRAM

## 2025-05-29 PROCEDURE — 250N000011 HC RX IP 250 OP 636: Performed by: EMERGENCY MEDICINE

## 2025-05-29 PROCEDURE — 36415 COLL VENOUS BLD VENIPUNCTURE: CPT | Performed by: EMERGENCY MEDICINE

## 2025-05-29 PROCEDURE — 82962 GLUCOSE BLOOD TEST: CPT

## 2025-05-29 PROCEDURE — 258N000003 HC RX IP 258 OP 636: Performed by: STUDENT IN AN ORGANIZED HEALTH CARE EDUCATION/TRAINING PROGRAM

## 2025-05-29 PROCEDURE — 83605 ASSAY OF LACTIC ACID: CPT | Performed by: EMERGENCY MEDICINE

## 2025-05-29 PROCEDURE — 83036 HEMOGLOBIN GLYCOSYLATED A1C: CPT | Performed by: STUDENT IN AN ORGANIZED HEALTH CARE EDUCATION/TRAINING PROGRAM

## 2025-05-29 PROCEDURE — 74177 CT ABD & PELVIS W/CONTRAST: CPT

## 2025-05-29 PROCEDURE — 80048 BASIC METABOLIC PNL TOTAL CA: CPT | Performed by: STUDENT IN AN ORGANIZED HEALTH CARE EDUCATION/TRAINING PROGRAM

## 2025-05-29 PROCEDURE — 258N000003 HC RX IP 258 OP 636: Performed by: EMERGENCY MEDICINE

## 2025-05-29 PROCEDURE — 99222 1ST HOSP IP/OBS MODERATE 55: CPT | Performed by: STUDENT IN AN ORGANIZED HEALTH CARE EDUCATION/TRAINING PROGRAM

## 2025-05-29 PROCEDURE — 250N000012 HC RX MED GY IP 250 OP 636 PS 637: Performed by: STUDENT IN AN ORGANIZED HEALTH CARE EDUCATION/TRAINING PROGRAM

## 2025-05-29 PROCEDURE — G0378 HOSPITAL OBSERVATION PER HR: HCPCS

## 2025-05-29 PROCEDURE — 99285 EMERGENCY DEPT VISIT HI MDM: CPT | Mod: 25

## 2025-05-29 PROCEDURE — 85048 AUTOMATED LEUKOCYTE COUNT: CPT | Performed by: STUDENT IN AN ORGANIZED HEALTH CARE EDUCATION/TRAINING PROGRAM

## 2025-05-29 RX ORDER — BUSPIRONE HYDROCHLORIDE 15 MG/1
30 TABLET ORAL AT BEDTIME
Status: DISCONTINUED | OUTPATIENT
Start: 2025-05-29 | End: 2025-05-30 | Stop reason: HOSPADM

## 2025-05-29 RX ORDER — PROCHLORPERAZINE MALEATE 10 MG
10 TABLET ORAL EVERY 6 HOURS PRN
Status: DISCONTINUED | OUTPATIENT
Start: 2025-05-29 | End: 2025-05-30 | Stop reason: HOSPADM

## 2025-05-29 RX ORDER — AMOXICILLIN 250 MG
1 CAPSULE ORAL 2 TIMES DAILY PRN
Status: DISCONTINUED | OUTPATIENT
Start: 2025-05-29 | End: 2025-05-30 | Stop reason: HOSPADM

## 2025-05-29 RX ORDER — METOPROLOL SUCCINATE 200 MG/1
200 TABLET, EXTENDED RELEASE ORAL DAILY
COMMUNITY
Start: 2025-05-23

## 2025-05-29 RX ORDER — ACETAMINOPHEN 325 MG/1
650 TABLET ORAL EVERY 4 HOURS PRN
Status: DISCONTINUED | OUTPATIENT
Start: 2025-05-29 | End: 2025-05-30 | Stop reason: HOSPADM

## 2025-05-29 RX ORDER — TIRZEPATIDE 10 MG/.5ML
10 INJECTION, SOLUTION SUBCUTANEOUS WEEKLY
Status: ON HOLD | COMMUNITY
Start: 2025-05-12 | End: 2025-05-30

## 2025-05-29 RX ORDER — NICOTINE POLACRILEX 4 MG
15-30 LOZENGE BUCCAL
Status: DISCONTINUED | OUTPATIENT
Start: 2025-05-29 | End: 2025-05-30 | Stop reason: HOSPADM

## 2025-05-29 RX ORDER — SERTRALINE HYDROCHLORIDE 100 MG/1
300 TABLET, FILM COATED ORAL DAILY
Status: DISCONTINUED | OUTPATIENT
Start: 2025-05-30 | End: 2025-05-30 | Stop reason: HOSPADM

## 2025-05-29 RX ORDER — SODIUM CHLORIDE 9 MG/ML
INJECTION, SOLUTION INTRAVENOUS CONTINUOUS
Status: DISCONTINUED | OUTPATIENT
Start: 2025-05-29 | End: 2025-05-30 | Stop reason: HOSPADM

## 2025-05-29 RX ORDER — ACETAMINOPHEN 650 MG/1
650 SUPPOSITORY RECTAL EVERY 4 HOURS PRN
Status: DISCONTINUED | OUTPATIENT
Start: 2025-05-29 | End: 2025-05-30 | Stop reason: HOSPADM

## 2025-05-29 RX ORDER — BUSPIRONE HYDROCHLORIDE 5 MG/1
15 TABLET ORAL 2 TIMES DAILY
Status: DISCONTINUED | OUTPATIENT
Start: 2025-05-30 | End: 2025-05-30 | Stop reason: HOSPADM

## 2025-05-29 RX ORDER — LAMOTRIGINE 100 MG/1
200 TABLET ORAL AT BEDTIME
Status: DISCONTINUED | OUTPATIENT
Start: 2025-05-29 | End: 2025-05-30 | Stop reason: HOSPADM

## 2025-05-29 RX ORDER — METOPROLOL SUCCINATE 100 MG/1
200 TABLET, EXTENDED RELEASE ORAL DAILY
Status: DISCONTINUED | OUTPATIENT
Start: 2025-05-30 | End: 2025-05-30 | Stop reason: HOSPADM

## 2025-05-29 RX ORDER — DEXTROSE MONOHYDRATE 25 G/50ML
25-50 INJECTION, SOLUTION INTRAVENOUS
Status: DISCONTINUED | OUTPATIENT
Start: 2025-05-29 | End: 2025-05-30 | Stop reason: HOSPADM

## 2025-05-29 RX ORDER — AMOXICILLIN 250 MG
2 CAPSULE ORAL 2 TIMES DAILY PRN
Status: DISCONTINUED | OUTPATIENT
Start: 2025-05-29 | End: 2025-05-30 | Stop reason: HOSPADM

## 2025-05-29 RX ORDER — ONDANSETRON 2 MG/ML
4 INJECTION INTRAMUSCULAR; INTRAVENOUS EVERY 6 HOURS PRN
Status: DISCONTINUED | OUTPATIENT
Start: 2025-05-29 | End: 2025-05-30 | Stop reason: HOSPADM

## 2025-05-29 RX ORDER — BENZONATATE 100 MG/1
100 CAPSULE ORAL 3 TIMES DAILY PRN
Status: DISCONTINUED | OUTPATIENT
Start: 2025-05-29 | End: 2025-05-30 | Stop reason: HOSPADM

## 2025-05-29 RX ORDER — IOPAMIDOL 755 MG/ML
120 INJECTION, SOLUTION INTRAVASCULAR ONCE
Status: COMPLETED | OUTPATIENT
Start: 2025-05-29 | End: 2025-05-29

## 2025-05-29 RX ORDER — ONDANSETRON 4 MG/1
4 TABLET, ORALLY DISINTEGRATING ORAL EVERY 6 HOURS PRN
Status: DISCONTINUED | OUTPATIENT
Start: 2025-05-29 | End: 2025-05-30 | Stop reason: HOSPADM

## 2025-05-29 RX ORDER — PANTOPRAZOLE SODIUM 40 MG/1
40 TABLET, DELAYED RELEASE ORAL 2 TIMES DAILY
Status: DISCONTINUED | OUTPATIENT
Start: 2025-05-29 | End: 2025-05-30 | Stop reason: HOSPADM

## 2025-05-29 RX ORDER — LORAZEPAM 2 MG/ML
0.5 INJECTION INTRAMUSCULAR ONCE
Status: COMPLETED | OUTPATIENT
Start: 2025-05-29 | End: 2025-05-29

## 2025-05-29 RX ORDER — ALBUTEROL SULFATE 90 UG/1
2 INHALANT RESPIRATORY (INHALATION) EVERY 6 HOURS PRN
Status: DISCONTINUED | OUTPATIENT
Start: 2025-05-29 | End: 2025-05-30 | Stop reason: HOSPADM

## 2025-05-29 RX ORDER — TRAZODONE HYDROCHLORIDE 100 MG/1
100 TABLET ORAL AT BEDTIME
Status: DISCONTINUED | OUTPATIENT
Start: 2025-05-29 | End: 2025-05-30 | Stop reason: HOSPADM

## 2025-05-29 RX ADMIN — IOPAMIDOL 120 ML: 755 INJECTION, SOLUTION INTRAVENOUS at 17:17

## 2025-05-29 RX ADMIN — LORAZEPAM 0.5 MG: 2 INJECTION INTRAMUSCULAR; INTRAVENOUS at 20:05

## 2025-05-29 RX ADMIN — SODIUM CHLORIDE 1000 ML: 0.9 INJECTION, SOLUTION INTRAVENOUS at 17:05

## 2025-05-29 RX ADMIN — ACETAMINOPHEN 650 MG: 325 TABLET ORAL at 21:39

## 2025-05-29 RX ADMIN — TIZANIDINE 4 MG: 4 TABLET ORAL at 21:43

## 2025-05-29 RX ADMIN — PANTOPRAZOLE SODIUM 40 MG: 40 TABLET, DELAYED RELEASE ORAL at 21:39

## 2025-05-29 RX ADMIN — BENZONATATE 100 MG: 100 CAPSULE ORAL at 21:39

## 2025-05-29 RX ADMIN — CLOZAPINE 250 MG: 100 TABLET ORAL at 21:47

## 2025-05-29 RX ADMIN — TRAZODONE HYDROCHLORIDE 100 MG: 50 TABLET ORAL at 21:39

## 2025-05-29 RX ADMIN — TRIHEXYPHENIDYL HYDROCHLORIDE 5 MG: 2 TABLET ORAL at 21:42

## 2025-05-29 RX ADMIN — SODIUM CHLORIDE, PRESERVATIVE FREE: 5 INJECTION INTRAVENOUS at 21:37

## 2025-05-29 RX ADMIN — BUSPIRONE HYDROCHLORIDE 30 MG: 15 TABLET ORAL at 21:40

## 2025-05-29 RX ADMIN — LAMOTRIGINE 200 MG: 100 TABLET ORAL at 21:42

## 2025-05-29 RX ADMIN — INSULIN ASPART 1 UNITS: 100 INJECTION, SOLUTION INTRAVENOUS; SUBCUTANEOUS at 21:52

## 2025-05-29 ASSESSMENT — COLUMBIA-SUICIDE SEVERITY RATING SCALE - C-SSRS
3. HAVE YOU BEEN THINKING ABOUT HOW YOU MIGHT KILL YOURSELF?: NO
5. HAVE YOU STARTED TO WORK OUT OR WORKED OUT THE DETAILS OF HOW TO KILL YOURSELF? DO YOU INTEND TO CARRY OUT THIS PLAN?: NO
6. HAVE YOU EVER DONE ANYTHING, STARTED TO DO ANYTHING, OR PREPARED TO DO ANYTHING TO END YOUR LIFE?: YES
BASED ON RESPONSES TO C-SSRS QS 1-6, WHAT IS THE PATIENT'S OVERALL RISK RATING FOR SUICIDE: HIGH RISK
2. HAVE YOU ACTUALLY HAD ANY THOUGHTS OF KILLING YOURSELF IN THE PAST MONTH?: YES
4. HAVE YOU HAD THESE THOUGHTS AND HAD SOME INTENTION OF ACTING ON THEM?: YES
1. IN THE PAST MONTH, HAVE YOU WISHED YOU WERE DEAD OR WISHED YOU COULD GO TO SLEEP AND NOT WAKE UP?: YES

## 2025-05-29 ASSESSMENT — ACTIVITIES OF DAILY LIVING (ADL)
ADLS_ACUITY_SCORE: 64
ADLS_ACUITY_SCORE: 58

## 2025-05-29 NOTE — ED NOTES
Expected Patient Referral to ED  1:51 PM    Referring Clinic/Provider:  [facility] Dr. Josh huertas    Reason for referral/Clinical facts:  Concern for sepsis.  40 yom with 1 week chest congestion and cough.  Worsened overnight.  Onset of vomiting.  Lactic acid 3.2, wbc 17.8.  sats 88-90%  on nasal cannula oxygen and up to 98%.  Hx/o diabetes type ii.  Bs 223.  Did cxr without infiltrates, weight greater than 400 lbs.  Starting rocephin.    Recommendations provided:  Contact inpatient service to discuss direct admission.  Patient sounds stable.  Could consider ct but treatment is for sepsis.  Weight is an issue so recommend go through bed placement to arrange a direct admission to hospital that has available inpatient bed for weight and pt with sepsis evaluation completed.    Discussed that if direct admit is sought and any hurdles are encountered, this ED would be happy to see the patient and evaluate.    Informed caller that recommendations provided are recommendations based only on the facts provided and that they responsible to accept or reject the advice, or to seek a formal in person consultation as needed and that this ED will see/treat patient should they arrive.      Marie Castillo MD  [facility] EMERGENCY ROOM  [address], [place] [address] [phone]       Marie Castillo MD  05/29/25 5554

## 2025-05-29 NOTE — ED NOTES
Bed: [place] [address]  Expected date:   Expected time:   Means of arrival:   Comments:  DK from triage, Room is staffed with RN

## 2025-05-29 NOTE — PROGRESS NOTES
Pt came to room with 3L o2, sats at 93%. Writer titrated O2 to RA to asses, pt now with Sats 90-91%. Pt states he sometimes feels SOB, mainly with exertion.

## 2025-05-29 NOTE — ED PROVIDER NOTES
EMERGENCY DEPARTMENT ENCOUNTER      NAME: Sunil Rondon  AGE: 40 year old male  YOB: 1985  MRN: [id number]  EVALUATION DATE & TIME: 5/29/2025  3:11 PM    PCP: Bertrand Valle    ED PROVIDER: Genaro Machado MD      Chief Complaint   Patient presents with    Nausea & Vomiting    Abdominal Pain         FINAL IMPRESSION:  1. Ileus (H)    2. Dehydration    3. Nausea and vomiting, unspecified vomiting type          ED COURSE & MEDICAL DECISION MAKING:    Pertinent Labs & Imaging studies reviewed. (See chart for details)  40 year old male presents to the Emergency Department for evaluation of vomiting, high white blood cell count    I reviewed labs and chest x-ray results from urgent care.  Does have blood cultures cocaine and ceftriaxone was given at urgent care    CT shows likely ileus.  He is stooling so I doubt bowel obstruction.  No longer vomiting.  I do not feel needs NG tube.  Likely elevated lactic acid and white blood cell count secondary to vomiting.  Doubt sepsis.  Did speak with patient and patient's father.  Did give a dose of Ativan for anxiety    Plan admit to the hospital for further evaluation    3:31 PM Spoke to [facility] [place] [facility]. They gave him 1 liter of fluids there and sent him to our ER with another liter of fluids.  7:11 PM Spoke to accepting hospitalist Dr. Thompson. They accept this patient for admission.  7:34 PM I went to recheck the patient and update on plan of care.  7:39 PM Spoke to patient's dad on the phone to provide updates.  7:43 PM Rechecked patient. He had a large loose bowel movement while here. Not actively vomiting. Patient would like to eat.  ED Course as of 05/29/25 1947   Thu May 29, 2025   1947 Lactic Acid(!): 2.2  Will repeat after third liter of fluids, down trending       Medical Decision Making  I obtained history from Family Member/Significant Other  I reviewed the EMR: Outpatient Record: Urgent care notes from today  I discussed the care with another  health care provider: Hospitalist  Admit.    MIPS (CTPE, Dental pain, Jaime, Sinusitis, Asthma/COPD, Head Trauma): Not Applicable    SEPSIS: None      At the conclusion of the encounter I discussed the results of all of the tests and the disposition. The questions were answered. The patient or family acknowledged understanding and was agreeable with the care plan.         MEDICATIONS GIVEN IN THE EMERGENCY:  Medications   LORazepam (ATIVAN) injection 0.5 mg (has no administration in time range)   sodium chloride 0.9% BOLUS 1,000 mL (0 mLs Intravenous Stopped 5/29/25 1944)   iopamidol (ISOVUE-370) solution 120 mL (120 mLs Intravenous $Given 5/29/25 1717)       NEW PRESCRIPTIONS STARTED AT TODAY'S ER VISIT  New Prescriptions    No medications on file          =================================================================    TRIAGE ASSESSMENT:  NV and abd pain started yesterday.  Went to UR today and is septic.  Lactate 3.2, WBC 17.2.  Had 2L NS, 2gm Rocephin, 4mg zofran IV.  Currently nausea is better, but slight still.     Triage Assessment (Adult)       Row Name 05/29/25 1505          Triage Assessment    Airway WDL WDL        Respiratory WDL    Respiratory WDL WDL        Skin Circulation/Temperature WDL    Skin Circulation/Temperature WDL WDL        Cardiac WDL    Cardiac WDL WDL        Peripheral/Neurovascular WDL    Peripheral Neurovascular WDL WDL        Cognitive/Neuro/Behavioral WDL    Cognitive/Neuro/Behavioral WDL WDL                          HPI    Patient information was obtained from: patient    Use of : N/A      Sunil Rondon is a 40 year old male with a pertinent history of sepsis, hypertension, hyperlipidemia, DMII, BETHEL, morbid obesity, suicide attempt and ideation, developmental delay, schizoaffective disorder, and depression who presents to this ED via referral from urgent care for evaluation of nausea/vomiting and abdominal pain.    Reviewed 5/29 visit to [facility] for vomiting.  Switched from Ozempic to Mounjaro 1 week ago. 4 days of cough and congestion, 2 days of vomiting with 7 episodes in total. Lactic 3.2, WBC 17.2. Given rocephin, Zofran, 2L fluid. Referred to ED.    PAST MEDICAL HISTORY:  Past Medical History:   Diagnosis Date    Anxiety     Bipolar 1 disorder (H)     Borderline mental retardation     Depression     Diabetes mellitus (H)     Gender identity disorder     GERD (gastroesophageal reflux disease)     Glucose intolerance (impaired glucose tolerance)     Hypertension     Obesity     Panic attacks     Schizoaffective disorder (H)     Sinus tarsi syndrome     Sleep apnea     Suicidal ideation        PAST SURGICAL HISTORY:  Past Surgical History:   Procedure Laterality Date    HC KNEE SCOPE,SINGLE MENISECTOMY Left 6/8/2016    Procedure: LEFT KNEE ARTHROSCOPY, PARTIAL MEDIAL MENISCECTOMY, LOOSE BODY REMOVAL, CHONDROPLASTY;  Surgeon: Sky Goodwin MD;  Location: [facility] OR;  Service: Orthopedics    OPEN REDUCTION INTERNAL FIXATION ANKLE Left 12/31/2018    Procedure: OPEN REDUCTION INTERNAL FIXATION, FRACTURE, LEFT ANKLE;  Surgeon: Jarad Mosqueda MD;  Location: [facility] OR;  Service: Orthopedics    TENDON REPAIR Left     wrist    UPPER GASTROINTESTINAL ENDOSCOPY             CURRENT MEDICATIONS:    acetaminophen (TYLENOL) 500 MG tablet  albuterol (PROAIR HFA/PROVENTIL HFA/VENTOLIN HFA) 108 (90 Base) MCG/ACT inhaler  atorvastatin (LIPITOR) 20 MG tablet  benzonatate (TESSALON) 100 MG capsule  busPIRone (BUSPAR) 15 MG tablet  busPIRone (BUSPAR) 15 MG tablet  cariprazine (VRAYLAR) 1.5 MG capsule  cloZAPine (CLOZARIL) 50 MG tablet  famotidine (PEPCID) 20 MG tablet  ibuprofen (ADVIL/MOTRIN) 400 MG tablet  lamoTRIgine (LAMICTAL) 200 MG tablet  metFORMIN (GLUCOPHAGE) 1000 MG tablet  metoprolol succinate ER (TOPROL XL) 200 MG 24 hr tablet  MOUNJARO 10 MG/0.5ML SOAJ auto-injector pen  omeprazole (PRILOSEC) 40 MG capsule  sertraline (ZOLOFT) 100 MG tablet  tiZANidine  (ZANAFLEX) 4 MG tablet  traZODone (DESYREL) 100 MG tablet  trihexyphenidyl (ARTANE) 5 MG tablet        ALLERGIES:  Allergies   Allergen Reactions    No Clinical Screening - See Comments GI Disturbance and Rash     Cherries and some other fruits       FAMILY HISTORY:  History reviewed. No pertinent family history.    SOCIAL HISTORY:   Social History     Socioeconomic History    Marital status: Single   Tobacco Use    Smoking status: Never    Smokeless tobacco: Never   Substance and Sexual Activity    Alcohol use: No    Drug use: No     Social Drivers of Health     Financial Resource Strain: Medium Risk (3/2/2023)    [facility]    Financial Resource Strain     Difficulty of Paying Living Expenses: 2     Difficulty of Paying Living Expenses: 1   Food Insecurity: No Food Insecurity (2/14/2024)    [facility]    Food Insecurity     Do you worry your food will run out before you are able to buy more?: 1   Transportation Needs: No Transportation Needs (2/14/2024)    Received from [facility]    Transportation Needs     Does lack of transportation keep you from medical appointments?: 1     Does lack of transportation keep you from work, meetings or getting things that you need?: 1   Social Connections: Socially Integrated (2/14/2024)    [facility]    Social Connections     Do you often feel lonely or isolated from those around you?: 0   Housing Stability: Low Risk  (2/14/2024)    [facility]    Housing Stability     What is your housing situation today?: 1       VITALS:  /72   Pulse 102   Temp 98.8  F (37.1  C) (Oral)   Resp 16   Wt (!) 204.1 kg (450 lb)   SpO2 92%   BMI 54.78 kg/m      PHYSICAL EXAM      Vitals: /72   Pulse 102   Temp 98.8  F (37.1  C) (Oral)   Resp 16   Wt (!)  204.1 kg (450 lb)   SpO2 92%   BMI 54.78 kg/m    General: Appears in no acute distress, awake, alert, interactive.  Eyes: Conjunctivae non-injected. Sclera anicteric.  HENT: Atraumatic.  Neck: Supple.  Respiratory/Chest: Respiration unlabored.  Abdomen: non distended  Musculoskeletal: Normal extremities. No edema or erythema.  Skin: Normal color. No rash or diaphoresis.  Neurologic: Face symmetric, moves all extremities spontaneously. Speech clear.  Psychiatric: Oriented to person, place, and time. Affect appropriate.    LAB:  All pertinent labs reviewed and interpreted.  Results for orders placed or performed during the hospital encounter of 05/29/25   CT Abdomen Pelvis w Contrast    Impression    IMPRESSION:   1.  Mildly dilated proximal to mid small bowel, with gradual transition to normal caliber distal small bowel, likely representing ileus, although low-grade obstruction difficult to exclude. No abrupt transition point identified.   Lactic Acid Whole Blood with 1X Repeat in 2 HR when >2   Result Value Ref Range    Lactic Acid, Initial 2.2 (H) 0.7 - 2.0 mmol/L       RADIOLOGY:  Reviewed all pertinent imaging. Please see official radiology report.  CT Abdomen Pelvis w Contrast   Final Result   IMPRESSION:    1.  Mildly dilated proximal to mid small bowel, with gradual transition to normal caliber distal small bowel, likely representing ileus, although low-grade obstruction difficult to exclude. No abrupt transition point identified.        I, Sherie Mckeon, am serving as a scribe to document services personally performed by Genaro Machado MD based on my observation and the provider's statements to me. I, Genaro Machado MD, attest that Sherie Mckeon is acting in a scribe capacity, has observed my performance of the services and has documented them in accordance with my direction.    Genaro Machado MD  [facility] EMERGENCY ROOM  [address]  [address]  [phone]      Genaro Machado MD  05/29/25 1947       Genaro Machado MD  05/29/25 1947

## 2025-05-29 NOTE — PROGRESS NOTES
2 hr lactic acid due at 1736, pt was in CT at the time. At time of patients return, fluids have not finished. Writer spoke with provider, okay to defer lactic acid lab draw until fluids done.

## 2025-05-29 NOTE — MEDICATION SCRIBE - ADMISSION MEDICATION HISTORY
Medication Scribe Admission Medication History    Admission medication history is complete. The information provided in this note is only as accurate as the sources available at the time of the update.    Information Source(s): Patient, Clinic records, Hospital records, and CareEverywhere/SureScripts via in-person    Pertinent Information: Patient reported taking AM and midday/afternoon medications PTA.     At Urgent Care the patient received a total of 2,000 mL of 0.9% NaCl and at 1317 he was given 4 mg of IV ondansetron as well.    Has home health nurse set up his medications on Fridays, but self-administers his own medications. Attempted to call home health nurse (Deloris, [phone]) on three occasions with no call back or answer. Due to this, any discrepancies were discussed with the patient.     Cariprazine has no recent fills on file, but patient reported this was a current medication and was on his list at the last admission (12/2024).    Clozapine is confirmed current. Daily dose is 250 mg at bedtime. ANC taken today (05/29/25) at 1317 was 16.5 thou/cu mm.    Changes made to PTA medication list:  Added:   Mounjaro (tirzepatide) 10 mg  Deleted:   Cefdinir 300 mg (completed 12/2025)  Diclofenac 1% gel - not taking, per pt  Liraglutide 18 mg/3 mL - on tirzepatide  Methocarbamol 500 mg - 30 DS filled 12/2024, not taking  Polyethylene glycol 17 g - 30 DS filled 12/2024, not taking  Changed: None    Addendum: Home health RN called back 8:59 PM on 05/29/25. She was able to confirm the patient's medication list which had some changes. In the original med rec the patient's clozapine dose was 325 mg at bedtime, but this was fairly recently changed to 250 mg at bedtime (taper started 5/16/2025 per RN). Additionally, famotidine was removed as this was no longer being taken. RN reported that Vraylar is indeed a current medication as well.    Allergies reviewed with patient and updates made in EHR: yes    Medication  History Completed By: Jericho Hawley 5/29/2025 4:58 PM    PTA Med List   Medication Sig Note Last Dose/Taking    acetaminophen (TYLENOL) 500 MG tablet Take 2 tablets (1,000 mg) by mouth every 6 hours as needed for pain or fever.  5/28/2025 Bedtime    albuterol (PROAIR HFA/PROVENTIL HFA/VENTOLIN HFA) 108 (90 Base) MCG/ACT inhaler Inhale 2 puffs into the lungs every 6 hours as needed   More than a month    atorvastatin (LIPITOR) 20 MG tablet Take 20 mg by mouth At Bedtime  5/28/2025 Bedtime    benzonatate (TESSALON) 100 MG capsule Take 1 capsule (100 mg) by mouth 3 times daily as needed for cough.  Unknown    busPIRone (BUSPAR) 15 MG tablet Take 30 mg by mouth at bedtime. (15 mg QAM/midday)  5/28/2025 Bedtime    busPIRone (BUSPAR) 15 MG tablet Take 15 mg by mouth 2 times daily. AM and midday/afternoon (30 mg at bedtime)  5/29/2025 Noon    cariprazine (VRAYLAR) 1.5 MG capsule Take 6 mg by mouth every morning.  5/29/2025 Morning    cloZAPine (CLOZARIL) 50 MG tablet Take 250 mg by mouth at bedtime. 5/29/2025: Total dose of 250 mg at bedtime. 5/28/2025 Bedtime    ibuprofen (ADVIL/MOTRIN) 400 MG tablet Take 1 tablet (400 mg) by mouth every 6 hours as needed for inflammatory pain.  Unknown    lamoTRIgine (LAMICTAL) 200 MG tablet Take 200 mg by mouth at bedtime.  5/28/2025 Bedtime    metFORMIN (GLUCOPHAGE) 1000 MG tablet Take 1,000 mg by mouth 2 times daily (with meals)   5/29/2025 Morning    metoprolol succinate ER (TOPROL XL) 200 MG 24 hr tablet Take 200 mg by mouth daily.  5/29/2025 Morning    MOUNJARO 10 MG/0.5ML SOAJ auto-injector pen Inject 10 mg subcutaneously once a week. Friday 5/23/2025    omeprazole (PRILOSEC) 40 MG capsule [OMEPRAZOLE (PRILOSEC) 40 MG CAPSULE] Take 40 mg by mouth 2 (two) times a day.  5/29/2025 Morning    sertraline (ZOLOFT) 100 MG tablet [SERTRALINE (ZOLOFT) 100 MG TABLET] Take 300 mg by mouth daily.  5/29/2025 Morning    tiZANidine (ZANAFLEX) 4 MG tablet Take 4 mg by mouth at bedtime.   5/28/2025 Bedtime    traZODone (DESYREL) 100 MG tablet Take 100 mg by mouth at bedtime.  5/28/2025 Bedtime    trihexyphenidyl (ARTANE) 5 MG tablet Take 5 mg by mouth at bedtime.  5/28/2025 Bedtime

## 2025-05-29 NOTE — ED TRIAGE NOTES
NV and abd pain started yesterday.  Went to UR today and is septic.  Lactate 3.2, WBC 17.2.  Had 2L NS, 2gm Rocephin, 4mg zofran IV.  Currently nausea is better, but slight still.     Triage Assessment (Adult)       Row Name 05/29/25 1504          Triage Assessment    Airway WDL WDL        Respiratory WDL    Respiratory WDL WDL        Skin Circulation/Temperature WDL    Skin Circulation/Temperature WDL WDL        Cardiac WDL    Cardiac WDL WDL        Peripheral/Neurovascular WDL    Peripheral Neurovascular WDL WDL        Cognitive/Neuro/Behavioral WDL    Cognitive/Neuro/Behavioral WDL WDL

## 2025-05-30 VITALS
TEMPERATURE: 98 F | BODY MASS INDEX: 38.36 KG/M2 | OXYGEN SATURATION: 91 % | HEART RATE: 101 BPM | HEIGHT: 76 IN | RESPIRATION RATE: 17 BRPM | SYSTOLIC BLOOD PRESSURE: 126 MMHG | WEIGHT: 315 LBS | DIASTOLIC BLOOD PRESSURE: 81 MMHG

## 2025-05-30 LAB
BASOPHILS # BLD AUTO: 0.1 10E3/UL (ref 0–0.2)
BASOPHILS NFR BLD AUTO: 0 %
EOSINOPHIL # BLD AUTO: 0.2 10E3/UL (ref 0–0.7)
EOSINOPHIL NFR BLD AUTO: 1 %
ERYTHROCYTE [DISTWIDTH] IN BLOOD BY AUTOMATED COUNT: 19.1 % (ref 10–15)
GLUCOSE BLDC GLUCOMTR-MCNC: 114 MG/DL (ref 70–99)
GLUCOSE BLDC GLUCOMTR-MCNC: 114 MG/DL (ref 70–99)
GLUCOSE BLDC GLUCOMTR-MCNC: 193 MG/DL (ref 70–99)
HCT VFR BLD AUTO: 38.7 % (ref 40–53)
HGB BLD-MCNC: 11.4 G/DL (ref 13.3–17.7)
HOLD SPECIMEN: NORMAL
IMM GRANULOCYTES # BLD: 0.2 10E3/UL
IMM GRANULOCYTES NFR BLD: 1 %
LYMPHOCYTES # BLD AUTO: 1.9 10E3/UL (ref 0.8–5.3)
LYMPHOCYTES NFR BLD AUTO: 16 %
MCH RBC QN AUTO: 22.4 PG (ref 26.5–33)
MCHC RBC AUTO-ENTMCNC: 29.5 G/DL (ref 31.5–36.5)
MCV RBC AUTO: 76 FL (ref 78–100)
MONOCYTES # BLD AUTO: 1.1 10E3/UL (ref 0–1.3)
MONOCYTES NFR BLD AUTO: 10 %
NEUTROPHILS # BLD AUTO: 8.1 10E3/UL (ref 1.6–8.3)
NEUTROPHILS NFR BLD AUTO: 71 %
NRBC # BLD AUTO: 0 10E3/UL
NRBC BLD AUTO-RTO: 0 /100
PLATELET # BLD AUTO: 193 10E3/UL (ref 150–450)
RBC # BLD AUTO: 5.09 10E6/UL (ref 4.4–5.9)
WBC # BLD AUTO: 11.4 10E3/UL (ref 4–11)

## 2025-05-30 PROCEDURE — 85025 COMPLETE CBC W/AUTO DIFF WBC: CPT | Performed by: STUDENT IN AN ORGANIZED HEALTH CARE EDUCATION/TRAINING PROGRAM

## 2025-05-30 PROCEDURE — 36415 COLL VENOUS BLD VENIPUNCTURE: CPT | Performed by: STUDENT IN AN ORGANIZED HEALTH CARE EDUCATION/TRAINING PROGRAM

## 2025-05-30 PROCEDURE — G0378 HOSPITAL OBSERVATION PER HR: HCPCS

## 2025-05-30 PROCEDURE — 99239 HOSP IP/OBS DSCHRG MGMT >30: CPT | Performed by: STUDENT IN AN ORGANIZED HEALTH CARE EDUCATION/TRAINING PROGRAM

## 2025-05-30 PROCEDURE — 250N000013 HC RX MED GY IP 250 OP 250 PS 637: Performed by: STUDENT IN AN ORGANIZED HEALTH CARE EDUCATION/TRAINING PROGRAM

## 2025-05-30 PROCEDURE — 250N000011 HC RX IP 250 OP 636: Performed by: STUDENT IN AN ORGANIZED HEALTH CARE EDUCATION/TRAINING PROGRAM

## 2025-05-30 PROCEDURE — 258N000003 HC RX IP 258 OP 636: Performed by: STUDENT IN AN ORGANIZED HEALTH CARE EDUCATION/TRAINING PROGRAM

## 2025-05-30 PROCEDURE — 82962 GLUCOSE BLOOD TEST: CPT

## 2025-05-30 RX ORDER — LORAZEPAM 2 MG/ML
0.5 INJECTION INTRAMUSCULAR ONCE
Status: COMPLETED | OUTPATIENT
Start: 2025-05-30 | End: 2025-05-30

## 2025-05-30 RX ADMIN — PANTOPRAZOLE SODIUM 40 MG: 40 TABLET, DELAYED RELEASE ORAL at 10:53

## 2025-05-30 RX ADMIN — SODIUM CHLORIDE, PRESERVATIVE FREE: 5 INJECTION INTRAVENOUS at 06:59

## 2025-05-30 RX ADMIN — METOPROLOL SUCCINATE 200 MG: 100 TABLET, EXTENDED RELEASE ORAL at 10:54

## 2025-05-30 RX ADMIN — BENZONATATE 100 MG: 100 CAPSULE ORAL at 16:53

## 2025-05-30 RX ADMIN — ACETAMINOPHEN 650 MG: 325 TABLET ORAL at 14:25

## 2025-05-30 RX ADMIN — LORAZEPAM 0.5 MG: 2 INJECTION INTRAMUSCULAR; INTRAVENOUS at 14:07

## 2025-05-30 RX ADMIN — BUSPIRONE HYDROCHLORIDE 15 MG: 5 TABLET ORAL at 10:54

## 2025-05-30 RX ADMIN — BENZONATATE 100 MG: 100 CAPSULE ORAL at 12:00

## 2025-05-30 RX ADMIN — CARIPRAZINE 6 MG: 1.5 CAPSULE, GELATIN COATED ORAL at 10:55

## 2025-05-30 RX ADMIN — BUSPIRONE HYDROCHLORIDE 15 MG: 5 TABLET ORAL at 12:00

## 2025-05-30 RX ADMIN — SERTRALINE 300 MG: 100 TABLET, FILM COATED ORAL at 10:54

## 2025-05-30 RX ADMIN — INSULIN ASPART 2 UNITS: 100 INJECTION, SOLUTION INTRAVENOUS; SUBCUTANEOUS at 12:10

## 2025-05-30 ASSESSMENT — ACTIVITIES OF DAILY LIVING (ADL)
ADLS_ACUITY_SCORE: 69
ADLS_ACUITY_SCORE: 65
ADLS_ACUITY_SCORE: 69
ADLS_ACUITY_SCORE: 69
ADLS_ACUITY_SCORE: 65
ADLS_ACUITY_SCORE: 65
ADLS_ACUITY_SCORE: 69
ADLS_ACUITY_SCORE: 65
ADLS_ACUITY_SCORE: 65
ADLS_ACUITY_SCORE: 69
ADLS_ACUITY_SCORE: 69
ADLS_ACUITY_SCORE: 64
ADLS_ACUITY_SCORE: 65
ADLS_ACUITY_SCORE: 65

## 2025-05-30 NOTE — PLAN OF CARE
Problem: Diarrhea  Goal: Effective Diarrhea Management  Intervention: Manage Diarrhea  Recent Flowsheet Documentation  Taken 5/29/2025 2130 by Ajith Wen RN  Medication Review/Management: medications reviewed     Problem: Pain Acute  Goal: Optimal Pain Control and Function  Intervention: Prevent or Manage Pain  Recent Flowsheet Documentation  Taken 5/29/2025 2130 by Ajith Wen RN  Medication Review/Management: medications reviewed     Goal Outcome Evaluation:  Pt is alert and oriented, calls appropriately for needs. Independent with ambulation. Vitals signs are stable, afebrile, oxygen WNL on room air. Pt is tolerating sips of water and ginger ale, denies nausea. IV is running fluids at 50/hr. Glucose checks ACHS, insulin coverage per sliding scale. Pt is voiding appropriately. Pt is having abdominal pain, PRN tylenol given for this. Need a stool sample, but patient has not been able to have a stool since the order was placed.    Plan: Advance diet tomorrow, pain control, monitor labs    Ajith Wen RN  May 29, 2025, 10:19 PM

## 2025-05-30 NOTE — PROGRESS NOTES
Note from [id number]. VSS on 2L O2 NC. Denies shortness of breath. A&Ox4. Denies pain. Full sensation per pt. SBA for transferring. R AC infusing NS at 50mL/hr. Sips of water & ginger ale per provider. Incontinent of bladder at times, urinal at bedside. Still need stool sample. Pt safe, makes needs known.

## 2025-05-30 NOTE — PROGRESS NOTES
"PRIMARY DIAGNOSIS: \"GENERIC\" NURSING  OUTPATIENT/OBSERVATION GOALS TO BE MET BEFORE DISCHARGE:  ADLs back to baseline: Yes    Activity and level of assistance: Ambulating independently.    Pain status: Improved-controlled with oral pain medications.    Return to near baseline physical activity: Yes     Discharge Planner Nurse   Safe discharge environment identified: Yes  Barriers to discharge: No       Entered by: Antwon Deleon RN 05/30/2025 4:12 PM     Please review provider order for any additional goals.   Nurse to notify provider when observation goals have been met and patient is ready for discharge.  Patient is A/Ox4, VSS on RA. SBA when ambulating. Voiding adequately. Full sensation per Pt. IV saline locked, patent. No new skin issues noted. Patient rating pain 5/10 during shift, pain medications, rest, and repositioning used to manage pain. Patient was complaining of anxiety and restlessness. MD notified and Ativan 0.5mg IV was given. Patient had a congested cough, Tessalon pearls given, effective per patient. Tolerating a regular diet, patient denies nausea and GI discomfort.          "

## 2025-05-30 NOTE — H&P
[facility]    History and Physical - Hospitalist Service       Date of Admission:  5/29/2025    Assessment & Plan      Sunil Rondon is a 40 year old male admitted on 5/29/2025.  He has a past medical history significant for diabetes, hypertension, asthma, mood disorder, obesity, BETHEL who presented to the hospital with abdominal discomfort, nausea and vomiting.     Abdominal discomfort  Nausea and vomiting  Lactic acidosis  CT abdomen and pelvis with contrast on presentation showed possible low-grade obstruction versus ileus.  Mild leukocytosis on presentation most likely reactive, however, cannot completely rule out infectious colitis.  Nausea and vomiting most likely related to partial small bowel obstruction versus ileus.  Other possible etiologies include Mounjaro side effect.  No signs of DKA.    Plan  Can drink water and ginger ale overnight.  Will most likely allow full liquid diet on 5/30 if no further episode of nausea and vomiting.    Enteric stool panel.  If persistent diarrhea by tomorrow will obtain C. difficile.  Lactic acid resolving  Normal saline at 50 cc/h    Diabetes mellitus type 2  At home on metformin 1000 mg twice daily, Mounjaro 10 mg once a week    Plan  Start sliding scale insulin n--> every 4 hours for now.  Hold metformin while inpatient, continue on discharge  Discontinue Mounjaro on discharge    Mood disorder  Borderline personality disorder  Depression  Insomnia  Lives in independent group home.  Mood appears to be stable.  Patient is on long list of medications including tizanidine, lamotrigine, trazodone, sertraline, clozapine, BuSpar    Plan  Continue home medications.    GERD    Plan  Continue home omeprazole    Morbid obesity  Sleep apnea    Plan  Did not tolerate CPAP in the past              Diet:  Clear liquid diet (will start with water and ginger ale on 5/29--can allow clear liquid diet on 5/30)  DVT Prophylaxis: Pneumatic Compression Devices  [employer]  Catheter: Not present  Lines: None     Cardiac Monitoring: None  Code Status:  Full code    Clinically Significant Risk Factors Present on Admission                   # Hypertension: Noted on problem list               # Financial/Environmental Concerns:           Disposition Plan     Medically Ready for Discharge: Anticipated Tomorrow           SINAN THOMPSON MD  Hospitalist Service  [facility]  Securely message with [employer] (more info)  Text page via [employer] Paging/Directory     ______________________________________________________________________    Chief Complaint   Abdominal discomfort, nausea and vomiting    History is obtained from the patient and the patient's father.    History of Present Illness   Sunil Rondon is a 40 year old male admitted on 5/29/2025.  He has a past medical history significant for diabetes, hypertension, asthma, mood disorder, obesity, BETHEL who presented to the hospital with abdominal discomfort, nausea and vomiting.    Patient was in his usual state of health until around a week ago, he was experiencing some symptoms of upper respiratory infection and cough.  Symptoms were resolving.  Patient was feeling okay when he went to bed on 5/28/2025, however, he was awakened with abdominal discomfort, bloating.  Over the last 24 hours he felt worsening abdominal pain associated with nausea and vomiting.     He denies any fever or chills.  He denies any chest pain.  He denies any similar symptoms in the past. He denies any C. difficile infection or colitis. He denies any sick contact.  Denies symptoms of UTI    He was evaluated in the urgent care clinic. Received IV fluids. Rocephin and Zofran. He was transferred to the ED for further evaluation.    Per patient, no recent change of medication besides switching from Ozempic to Mounjaro around 1 week ago. He did not notice any symptoms of nausea, abdominal pain or diarrhea immediately after switching to Mounjaro.    His  last bowel movement was on 5/28, however, since admission he had 2 episodes of watery diarrhea.    Labs collected in the urgent care clinic.  That showed leukocytosis with WBC around 17.  Anion gap 13.  Lactic acid 3.2.  Glucose around 200.  Influenza A and B.  COVID-19 negative.      Past Medical History    Past Medical History:   Diagnosis Date    Anxiety     Bipolar 1 disorder (H)     Borderline mental retardation     Depression     Diabetes mellitus (H)     Gender identity disorder     GERD (gastroesophageal reflux disease)     Glucose intolerance (impaired glucose tolerance)     Hypertension     Obesity     Panic attacks     Schizoaffective disorder (H)     Sinus tarsi syndrome     Sleep apnea     Suicidal ideation        Past Surgical History   Past Surgical History:   Procedure Laterality Date    HC KNEE SCOPE,SINGLE MENISECTOMY Left 6/8/2016    Procedure: LEFT KNEE ARTHROSCOPY, PARTIAL MEDIAL MENISCECTOMY, LOOSE BODY REMOVAL, CHONDROPLASTY;  Surgeon: Sky Goodwin MD;  Location: [facility];  Service: Orthopedics    OPEN REDUCTION INTERNAL FIXATION ANKLE Left 12/31/2018    Procedure: OPEN REDUCTION INTERNAL FIXATION, FRACTURE, LEFT ANKLE;  Surgeon: Jarad Mosqueda MD;  Location: [facility];  Service: Orthopedics    TENDON REPAIR Left     wrist    UPPER GASTROINTESTINAL ENDOSCOPY         Prior to Admission Medications   Prior to Admission Medications   Prescriptions Last Dose Informant Patient Reported? Taking?   MOUNJARO 10 MG/0.5ML SOAJ auto-injector pen 5/23/2025  Yes Yes   Sig: Inject 10 mg subcutaneously once a week. Friday   acetaminophen (TYLENOL) 500 MG tablet 5/28/2025 Bedtime  No Yes   Sig: Take 2 tablets (1,000 mg) by mouth every 6 hours as needed for pain or fever.   albuterol (PROAIR HFA/PROVENTIL HFA/VENTOLIN HFA) 108 (90 Base) MCG/ACT inhaler More than a month  Yes Yes   Sig: Inhale 2 puffs into the lungs every 6 hours as needed    atorvastatin (LIPITOR) 20 MG tablet 5/28/2025  Bedtime  Yes Yes   Sig: Take 20 mg by mouth At Bedtime   benzonatate (TESSALON) 100 MG capsule Unknown  No Yes   Sig: Take 1 capsule (100 mg) by mouth 3 times daily as needed for cough.   busPIRone (BUSPAR) 15 MG tablet 5/29/2025 Noon  Yes Yes   Sig: Take 15 mg by mouth 2 times daily. AM and midday/afternoon (30 mg at bedtime)   busPIRone (BUSPAR) 15 MG tablet 5/28/2025 Bedtime  Yes Yes   Sig: Take 30 mg by mouth at bedtime. (15 mg QAM/midday)   cariprazine (VRAYLAR) 1.5 MG capsule 5/29/2025 Morning  Yes Yes   Sig: Take 6 mg by mouth every morning.   cloZAPine (CLOZARIL) 50 MG tablet 5/28/2025 Bedtime  Yes Yes   Sig: Take 325 mg by mouth at bedtime.   famotidine (PEPCID) 20 MG tablet 5/29/2025 Morning  Yes Yes   Sig: [FAMOTIDINE (PEPCID) 20 MG TABLET] Take 20 mg by mouth 2 (two) times a day.   ibuprofen (ADVIL/MOTRIN) 400 MG tablet Unknown  No Yes   Sig: Take 1 tablet (400 mg) by mouth every 6 hours as needed for inflammatory pain.   lamoTRIgine (LAMICTAL) 200 MG tablet 5/28/2025 Bedtime  Yes Yes   Sig: Take 200 mg by mouth at bedtime.   metFORMIN (GLUCOPHAGE) 1000 MG tablet 5/29/2025 Morning  Yes Yes   Sig: Take 1,000 mg by mouth 2 times daily (with meals)    metoprolol succinate ER (TOPROL XL) 200 MG 24 hr tablet 5/29/2025 Morning  Yes Yes   Sig: Take 200 mg by mouth daily.   omeprazole (PRILOSEC) 40 MG capsule 5/29/2025 Morning  Yes Yes   Sig: [OMEPRAZOLE (PRILOSEC) 40 MG CAPSULE] Take 40 mg by mouth 2 (two) times a day.   sertraline (ZOLOFT) 100 MG tablet 5/29/2025 Morning  Yes Yes   Sig: [SERTRALINE (ZOLOFT) 100 MG TABLET] Take 300 mg by mouth daily.   tiZANidine (ZANAFLEX) 4 MG tablet 5/28/2025 Bedtime  Yes Yes   Sig: Take 4 mg by mouth at bedtime.   traZODone (DESYREL) 100 MG tablet 5/28/2025 Bedtime  Yes Yes   Sig: Take 100 mg by mouth at bedtime.   trihexyphenidyl (ARTANE) 5 MG tablet 5/28/2025 Bedtime  Yes Yes   Sig: Take 5 mg by mouth at bedtime.      Facility-Administered Medications: None           Physical Exam   Vital Signs: Temp: 98.8  F (37.1  C) Temp src: Oral BP: 118/72 Pulse: 102   Resp: 16 SpO2: 92 % O2 Device: Nasal cannula Oxygen Delivery: 3 LPM  Weight: 450 lbs 0 oz    Physical Exam  Constitutional:       General: He is not in acute distress.     Appearance: He is not ill-appearing or toxic-appearing.   Pulmonary:      Effort: Pulmonary effort is normal.   Abdominal:      General: Bowel sounds are normal. There is distension.      Tenderness: There is no abdominal tenderness. There is no guarding.   Skin:     General: Skin is warm and dry.   Neurological:      Mental Status: He is alert.          Medical Decision Making       65 MINUTES SPENT BY ME on the date of service doing chart review, history, exam, documentation & further activities per the note.      Data     I have personally reviewed the following data over the past 24 hrs:    15.2 (H)  \   12.3 (L)   / 261     N/A N/A N/A /  N/A   N/A N/A N/A \     Procal: N/A CRP: N/A Lactic Acid: 2.0         Imaging results reviewed over the past 24 hrs:   Recent Results (from the past 24 hours)   XR Chest 2 Views    Narrative    For Patients: As a result of the 21st Century Cures Act, medical imaging exams and procedure reports are released immediately into your electronic medical record. You may view this report before your referring provider. If you have questions, please contact your health care provider.    EXAM: XR CHEST 2 VIEWS PA AND LATERAL  LOCATION: [facility]  DATE: 5/29/2025    INDICATION: SOB  COMPARISON: 2/22/2025    Impression    Lungs are clear. Heart and pulmonary vascularity are normal. No signs of acute disease.   CT Abdomen Pelvis w Contrast    Narrative    EXAM: CT ABDOMEN PELVIS W CONTRAST  LOCATION: [facility]  DATE: 5/29/2025    INDICATION: Vomiting and high lactate.  COMPARISON: 12/5/2024  TECHNIQUE: CT scan of the abdomen and pelvis was performed following injection of IV  contrast. Multiplanar reformats were obtained. Dose reduction techniques were used.  CONTRAST: Isovue 370 120mL    FINDINGS:   LOWER CHEST: Unremarkable.    HEPATOBILIARY: Normal.    PANCREAS: Normal.    SPLEEN: Normal.    ADRENAL GLANDS: Normal.    KIDNEYS/BLADDER: Left renal upper pole simple cyst measuring 1.8 cm; no follow-up indicated. No hydronephrosis. Urinary bladder appears normal.    BOWEL: Multiple mildly dilated proximal to mid small bowel loops, measuring up to 4.7 cm in diameter. There is gradual transition to normal caliber distal small bowel. No abrupt transition point identified. No bowel wall thickening or pneumatosis. Colon   appears normal. Normal appendix. No evidence of acute appendicitis.    LYMPH NODES: No lymphadenopathy.    VASCULATURE: Unremarkable.    PELVIC ORGANS: Unremarkable.    MUSCULOSKELETAL: Mild multilevel degenerative changes of the spine.      Impression    IMPRESSION:   1.  Mildly dilated proximal to mid small bowel, with gradual transition to normal caliber distal small bowel, likely representing ileus, although low-grade obstruction difficult to exclude. No abrupt transition point identified.

## 2025-05-30 NOTE — ED NOTES
"[facility] ED Handoff Report    ED Chief Complaint: nausea Vomiting     ED Diagnosis:  (K56.7) Ileus (H)    (E86.0) Dehydration    (R11.2) Nausea and vomiting, unspecified vomiting type  Comment: currently denies nausea or vomiting   PMH:    Past Medical History:   Diagnosis Date    Anxiety     Bipolar 1 disorder (H)     Borderline mental retardation     Depression     Diabetes mellitus (H)     Gender identity disorder     GERD (gastroesophageal reflux disease)     Glucose intolerance (impaired glucose tolerance)     Hypertension     Obesity     Panic attacks     Schizoaffective disorder (H)     Sinus tarsi syndrome     Sleep apnea     Suicidal ideation         Code Status:  Full Code     Falls Risk: Yes Band: Applied    Current Living Situation/Residence: lives in a group home     Elimination Status: Continent: Yes     Activity Level: SBA    Patient's Preferred Language:  English     Needed: No    Vital Signs:  BP (!) 155/78   Pulse 99   Temp 97.5  F (36.4  C) (Oral)   Resp 20   Ht 1.93 m (6' 4\")   Wt (!) 204.1 kg (450 lb)   SpO2 94%   BMI 54.78 kg/m       Cardiac Rhythm: NSR    Pain Score: 0/10    Is the Patient Confused:  No    Last Food or Drink: 05/30/25 at 1000    Assessment and Plan of Care:  Patient was sent here from the urgency room with concern for sepsis, nausea vomiting and diarrhea. CT of abdomen showed possible ileus. Patient had episodes of diarrhea yesterday that is resolved this morning. Denies nausea of vomiting. Currently on clear liquids for  diet. He is eating clears for breakfast with plans to advance if tolerating.     Tests Performed: Done: Labs and Imaging    Treatments Provided:  IV fluids     Family Dynamics/Concerns: No    Belongings Checklist Done and Signed by Patient: Yes    Belongings Sent with Patient: pants and T-shirts     Boarding medications sent with patient: Insulin pen     Additional Information: None     RN: Tee Peña RN   5/30/2025 11:06 AM "

## 2025-05-30 NOTE — DISCHARGE SUMMARY
"[facility]  Hospitalist Discharge Summary      Date of Admission:  5/29/2025  Date of Discharge:  5/30/2025  Discharging Provider: SINAN THOMPSON MD  Discharge Service: Hospitalist Service    Discharge Diagnoses   Nausea/vomiting due to ileus versus small bowel obstruction  Possible Mounjaro side effect  Morbid obesity  Mild lactic acidosis      Clinically Significant Risk Factors     # DMII: A1C = 7.1 % (Ref range: <5.7 %) within past 6 months    # Morbid Obesity: Estimated body mass index is 54.78 kg/m  as calculated from the following:    Height as of this encounter: 1.93 m (6' 4\").    Weight as of this encounter: 204.1 kg (450 lb).       Follow-ups Needed After Discharge   Follow-up Appointments       Hospital Follow-up with Existing Primary Care Provider (PCP)          Schedule Primary Care visit within: 7 Days   Recommended labs and Imaging (to be ordered by Primary Care Provider): CBC, BMP               Unresulted Labs Ordered in the Past 30 Days of this Admission       No orders found for last 31 day(s).            Discharge Disposition   Discharged to home  Condition at discharge: Good    Hospital Course   Mr. Rondon is a 40 years old man with past medical history significant for mood disorder, borderline personality disorder, depression, morbid obesity, diabetes who presented to the urgent care clinic on 5/29 with abdominal discomfort, nausea, vomiting.  CT abdomen pelvis showed mildly dilated proximal to mid small bowel likely representing ileus versus low-grade obstruction.  Patient had mild leukocytosis on presentation.  Within a few hours of presentation, patient had multiple episodes of watery diarrhea and his symptoms improved after that.  5/30.  No further episode of nausea or vomiting. No abdominal pain. No further episode of diarrhea. Tolerating regular diet well.  Leukocytosis resolving.  Continues to experience intermittent mild cough.  However, chest x-ray on 5/29 " without signs of pneumonia.  Vitals are stable.    Diabetes mellitus type 2  At home on metformin 1000 mg twice daily, Mounjaro 10 mg once a week.  Given that his symptoms started after switching from Ozempic to Mounjaro.  Cannot completely rule out potential relationship and side effect of Mounjaro.     Plan  Discontinue Mounjaro and restart Ozempic 1 mg weekly  Continue home metformin  Follow-up with primary care physician as an outpatient     Mood disorder  Borderline personality disorder  Depression  Insomnia  Lives in independent group home.  Mood appears to be stable.  Patient is on long list of medications including tizanidine, lamotrigine, trazodone, sertraline, clozapine, BuSpar.  Remittent anxiety, patient attributed to being in the hospital.     Plan  Continue home medications.     GERD     Plan  Continue home omeprazole     Morbid obesity  Sleep apnea     Plan  Did not tolerate CPAP in the past        Consultations This Hospital Stay   None    Code Status   Full Code    Time Spent on this Encounter   I, SINAN THOMPSON MD, personally saw the patient today and spent greater than 30 minutes discharging this patient.       SINAN THOMPSON MD  [address] [address]  Phone: [phone]  Fax: [phone]  ______________________________________________________________________    Physical Exam   Vital Signs: Temp: 98  F (36.7  C) Temp src: Oral BP: 126/81 Pulse: 101   Resp: 17 SpO2: (!) 91 % O2 Device: None (Room air) Oxygen Delivery: 2 LPM  Weight: 450 lbs 0 oz  Physical Exam  Constitutional:       General: He is not in acute distress.     Appearance: He is obese. He is not ill-appearing or toxic-appearing.   Pulmonary:      Effort: Pulmonary effort is normal. No respiratory distress.      Breath sounds: No wheezing.      Comments: No crackles.  Abdominal:      Palpations: Abdomen is soft.      Tenderness: There is no abdominal  tenderness.      Comments: Normal bowel sounds in all 4 quadrants.   Skin:     General: Skin is warm and dry.   Neurological:      Mental Status: He is alert.             Primary Care Physician   Bertrand Valle    Discharge Orders      Reason for your hospital stay    Abdominal discomfort, nausea and vomiting possibly related to ileus or small bowel obstruction.  Other possible etiologies include Mounjaro side effect.  Intermittent cough, however, chest x-ray on 5/29 without signs of pneumonia.     Activity    Your activity upon discharge: activity as tolerated     Diet    Follow this diet upon discharge: Current Diet:Orders Placed This Encounter      Regular Diet Adult     Hospital Follow-up with Existing Primary Care Provider (PCP)            Significant Results and Procedures   Most Recent 3 CBC's:  Recent Labs   Lab Test 05/30/25  1126 05/29/25 2036 02/22/25  1648   WBC 11.4* 15.2* 11.2*   HGB 11.4* 12.3* 11.5*   MCV 76* 75* 74*    261 210     Most Recent 3 BMP's:  Recent Labs   Lab Test 05/30/25  1207 05/30/25  0532 05/30/25  0145 05/29/25 2125 05/29/25 2036 02/22/25  1648 12/05/24 2005 12/05/24 2004   NA  --   --   --   --  139 141  --  139   POTASSIUM  --   --   --   --  4.4 4.4  --  4.1   CHLORIDE  --   --   --   --  107 106  --  105   CO2  --   --   --   --  19* 26  --  23   BUN  --   --   --   --  17.2 15.0  --  17.0   CR  --   --   --   --  0.63* 0.77  --  0.88   ANIONGAP  --   --   --   --  13 9  --  11   SANAZ  --   --   --   --  9.2 9.0  --  9.3   * 114* 114*   < > 149* 128*   < > 98    < > = values in this interval not displayed.   ,   Results for orders placed or performed during the hospital encounter of 05/29/25   CT Abdomen Pelvis w Contrast    Narrative    EXAM: CT ABDOMEN PELVIS W CONTRAST  LOCATION: [facility]  DATE: 5/29/2025    INDICATION: Vomiting and high lactate.  COMPARISON: 12/5/2024  TECHNIQUE: CT scan of the abdomen and pelvis was performed  following injection of IV contrast. Multiplanar reformats were obtained. Dose reduction techniques were used.  CONTRAST: Isovue 370 120mL    FINDINGS:   LOWER CHEST: Unremarkable.    HEPATOBILIARY: Normal.    PANCREAS: Normal.    SPLEEN: Normal.    ADRENAL GLANDS: Normal.    KIDNEYS/BLADDER: Left renal upper pole simple cyst measuring 1.8 cm; no follow-up indicated. No hydronephrosis. Urinary bladder appears normal.    BOWEL: Multiple mildly dilated proximal to mid small bowel loops, measuring up to 4.7 cm in diameter. There is gradual transition to normal caliber distal small bowel. No abrupt transition point identified. No bowel wall thickening or pneumatosis. Colon   appears normal. Normal appendix. No evidence of acute appendicitis.    LYMPH NODES: No lymphadenopathy.    VASCULATURE: Unremarkable.    PELVIC ORGANS: Unremarkable.    MUSCULOSKELETAL: Mild multilevel degenerative changes of the spine.      Impression    IMPRESSION:   1.  Mildly dilated proximal to mid small bowel, with gradual transition to normal caliber distal small bowel, likely representing ileus, although low-grade obstruction difficult to exclude. No abrupt transition point identified.       Discharge Medications   Current Discharge Medication List        START taking these medications    Details   Semaglutide, 1 MG/DOSE, (OZEMPIC) 4 MG/3ML pen Inject 1 mg subcutaneously every 7 days.  Qty: 3 mL, Refills: 0    Associated Diagnoses: Type 2 diabetes mellitus with other specified complication, with long-term current use of insulin (H)           CONTINUE these medications which have NOT CHANGED    Details   acetaminophen (TYLENOL) 500 MG tablet Take 2 tablets (1,000 mg) by mouth every 6 hours as needed for pain or fever.  Qty: 50 tablet, Refills: 0      albuterol (PROAIR HFA/PROVENTIL HFA/VENTOLIN HFA) 108 (90 Base) MCG/ACT inhaler Inhale 2 puffs into the lungs every 6 hours as needed       atorvastatin (LIPITOR) 20 MG tablet Take 20 mg by mouth  At Bedtime      benzonatate (TESSALON) 100 MG capsule Take 1 capsule (100 mg) by mouth 3 times daily as needed for cough.  Qty: 9 capsule, Refills: 0      !! busPIRone (BUSPAR) 15 MG tablet Take 30 mg by mouth at bedtime. (15 mg QAM/midday)      !! busPIRone (BUSPAR) 15 MG tablet Take 15 mg by mouth 2 times daily. AM and midday/afternoon (30 mg at bedtime)      cariprazine (VRAYLAR) 1.5 MG capsule Take 6 mg by mouth every morning.      cloZAPine (CLOZARIL) 50 MG tablet Take 250 mg by mouth at bedtime.      ibuprofen (ADVIL/MOTRIN) 400 MG tablet Take 1 tablet (400 mg) by mouth every 6 hours as needed for inflammatory pain.  Qty: 15 tablet, Refills: 0    Associated Diagnoses: Muscle pain      lamoTRIgine (LAMICTAL) 200 MG tablet Take 200 mg by mouth at bedtime.      metFORMIN (GLUCOPHAGE) 1000 MG tablet Take 1,000 mg by mouth 2 times daily (with meals)       metoprolol succinate ER (TOPROL XL) 200 MG 24 hr tablet Take 200 mg by mouth daily.      omeprazole (PRILOSEC) 40 MG capsule [OMEPRAZOLE (PRILOSEC) 40 MG CAPSULE] Take 40 mg by mouth 2 (two) times a day.      sertraline (ZOLOFT) 100 MG tablet [SERTRALINE (ZOLOFT) 100 MG TABLET] Take 300 mg by mouth daily.      tiZANidine (ZANAFLEX) 4 MG tablet Take 4 mg by mouth at bedtime.      traZODone (DESYREL) 100 MG tablet Take 100 mg by mouth at bedtime.      trihexyphenidyl (ARTANE) 5 MG tablet Take 5 mg by mouth at bedtime.       !! - Potential duplicate medications found. Please discuss with provider.        STOP taking these medications       MOUNJARO 10 MG/0.5ML SOAJ auto-injector pen Comments:   Reason for Stopping:             Allergies   Allergies   Allergen Reactions    No Clinical Screening - See Comments GI Disturbance and Rash     Cherries and some other fruits

## 2025-05-31 ENCOUNTER — PATIENT OUTREACH (OUTPATIENT)
Dept: CARE COORDINATION | Facility: CLINIC | Age: 40
End: 2025-05-31
Payer: MEDICAID

## 2025-05-31 LAB — GLUCOSE BLDC GLUCOMTR-MCNC: 132 MG/DL (ref 70–99)

## 2025-05-31 NOTE — PROGRESS NOTES
"Clinic Care Coordination Contact  Transitions of Care Outreach  Chief Complaint   Patient presents with    Clinic Care Coordination - [facility]       Most Recent Admission Date: 5/29/2025   Most Recent Admission Diagnosis: Dehydration - E86.0  Ileus (H) - K56.7  Sepsis (H) - A41.9  Nausea and vomiting, unspecified vomiting type - R11.2     Most Recent Discharge Date: 5/30/2025   Most Recent Discharge Diagnosis: Ileus (H) - K56.7  Dehydration - E86.0  Nausea and vomiting, unspecified vomiting type - R11.2  Type 2 diabetes mellitus with other specified complication, with long-term current use of insulin (H) - E11.69, Z79.4     Transitions of Care Assessment    Discharge Assessment  How are you doing now that you are home?: \" Farideh Farideh \"  How are your symptoms? (Red Flag symptoms escalate to triage hotline per guidelines): Improved, Unchanged  Do you know how to contact your clinic care team if you have future questions or changes to your health status? : Yes  Does the patient have their discharge instructions? : Yes  Does the patient have questions regarding their discharge instructions? : No  Were you started on any new medications or were there changes to any of your previous medications? : Yes  Does the patient have all of their medications?: Yes  Do you have questions regarding any of your medications? : No  Do you have all of your needed medical supplies or equipment (DME)?  (i.e. oxygen tank, CPAP, cane, etc.): Yes    Post-op ([facility] Only)  If the patient had a surgery or procedure, do they have any questions for a nurse?: No             Follow up Plan     Discharge Follow-Up  Discharge follow up appointment scheduled in alignment with recommended follow up timeframe or Transitions of Risk Category? (Low = within 30 days; Moderate= within 14 days; High= within 7 days): No    No future appointments.    Outpatient Plan as outlined on AVS reviewed with patient.    For any urgent concerns, please contact our 24 " hour nurse triage line: [phone] ([phone])       Eneida Márquez MA